# Patient Record
Sex: MALE | Race: WHITE | Employment: FULL TIME | URBAN - METROPOLITAN AREA
[De-identification: names, ages, dates, MRNs, and addresses within clinical notes are randomized per-mention and may not be internally consistent; named-entity substitution may affect disease eponyms.]

---

## 2017-06-19 ENCOUNTER — OFFICE VISIT (OUTPATIENT)
Dept: FAMILY MEDICINE CLINIC | Facility: CLINIC | Age: 57
End: 2017-06-19

## 2017-06-19 VITALS
WEIGHT: 186 LBS | DIASTOLIC BLOOD PRESSURE: 88 MMHG | HEIGHT: 68.5 IN | BODY MASS INDEX: 27.87 KG/M2 | TEMPERATURE: 98 F | RESPIRATION RATE: 16 BRPM | HEART RATE: 50 BPM | SYSTOLIC BLOOD PRESSURE: 130 MMHG

## 2017-06-19 DIAGNOSIS — K92.1 BLOOD IN STOOL: Primary | ICD-10-CM

## 2017-06-19 PROCEDURE — 99213 OFFICE O/P EST LOW 20 MIN: CPT | Performed by: FAMILY MEDICINE

## 2017-06-19 PROCEDURE — 82272 OCCULT BLD FECES 1-3 TESTS: CPT | Performed by: FAMILY MEDICINE

## 2017-06-19 NOTE — PATIENT INSTRUCTIONS
When You Have Gastrointestinal (GI) Bleeding    Blood in your vomit or stool can be a sign of gastrointestinal (GI) bleeding. GI bleeding can be scary. But the cause may not be serious. You should always see a doctor if GI bleeding occurs.   The GI tract · Blood tests. A blood sample is taken and sent to a lab for exam.  · Hemoccult test. Checks a stool sample for blood. · Stool culture. Checks a stool sample for bacteria or parasites. · X-ray, ultrasound, or CT scan.  Imaging tests that take pictures of © 4316-6639 [address], [address] [place]. All rights reserved. This information is not intended as a substitute for professional medical care. Always follow your healthcare professional's instructions.

## 2017-06-19 NOTE — PROGRESS NOTES
[facility] Family Medicine Office Note  Chief Complaint:   Patient presents with:  Rectal Bleeding: blood on stool x 2 weeks, hx hemorrhoids      HPI:   This is a 59year old male coming in for blood in stool x 2 weeks.   The bleeding is associate stool.  NEUROLOGICAL:  Denies headache, dizziness, syncope. MUSCULOSKELETAL:  Denies muscle, back pain, joint pain or stiffness.     EXAM:   /88 mmHg  Pulse 50  Temp(Src) 97.5 °F (36.4 °C) (Oral)  Resp 16  Ht 68.5\"  Wt 186 lb  BMI 27.87 kg/m2 Estima effects or complications from the treatments as a result of today.      Problem List:  Patient Active Problem List:     Other malignant neoplasm of skin, site unspecified     Unspecified asthma(493.90)      SUSIE MISTRY DO  6/19/2017  12:35 PM

## 2017-07-31 PROBLEM — K62.89 RECTAL MASS: Status: ACTIVE | Noted: 2017-07-31

## 2017-07-31 PROCEDURE — 88305 TISSUE EXAM BY PATHOLOGIST: CPT | Performed by: INTERNAL MEDICINE

## 2017-08-01 ENCOUNTER — TELEPHONE (OUTPATIENT)
Dept: FAMILY MEDICINE CLINIC | Facility: CLINIC | Age: 57
End: 2017-08-01

## 2017-08-01 ENCOUNTER — HOSPITAL ENCOUNTER (OUTPATIENT)
Dept: RADIATION ONCOLOGY | Age: 57
End: 2017-08-01
Attending: RADIOLOGY
Payer: COMMERCIAL

## 2017-08-01 NOTE — TELEPHONE ENCOUNTER
Call to pt-sts was advised yesterday after colonoscopy that a mass was found, needed to see surgeon. Advised of info noted below from dr cuevas. Pt agrees would like to see dr cuevas tomorrow morning, if possible. Offered multiple appt options.  Pt requests

## 2017-08-01 NOTE — TELEPHONE ENCOUNTER
Yes I have spoken to Dr. Naomi Berg. We are awaiting biopsies and Dr. Naomi Berg is going to follow up with him as well as refer him to general surgeon with Dr. Kemal Garcia. He will also have him see oncology.   I'd be happy to discuss all this in office if he wants

## 2017-08-01 NOTE — TELEPHONE ENCOUNTER
Incoming call from patient, states he had Colonoscopy done with  [facility] yesterday, and it doesn't look good, would like to know if you have reviewed the report. Please advise, thank you.

## 2017-08-02 ENCOUNTER — OFFICE VISIT (OUTPATIENT)
Dept: FAMILY MEDICINE CLINIC | Facility: CLINIC | Age: 57
End: 2017-08-02

## 2017-08-02 VITALS
HEIGHT: 70 IN | TEMPERATURE: 98 F | SYSTOLIC BLOOD PRESSURE: 110 MMHG | RESPIRATION RATE: 18 BRPM | HEART RATE: 72 BPM | WEIGHT: 185 LBS | DIASTOLIC BLOOD PRESSURE: 80 MMHG | BODY MASS INDEX: 26.48 KG/M2 | OXYGEN SATURATION: 99 %

## 2017-08-02 DIAGNOSIS — K62.89 RECTAL MASS: Primary | ICD-10-CM

## 2017-08-02 PROCEDURE — 99213 OFFICE O/P EST LOW 20 MIN: CPT | Performed by: FAMILY MEDICINE

## 2017-08-02 NOTE — PROGRESS NOTES
[facility] Family Medicine Office Note  Chief Complaint:   Patient presents with:  Lab Results: discuss colonoscopy result      HPI:   This is a 59year old male coming in for follow-up of colonoscopy results which revealed the patient has a rect muscle, back pain, joint pain or stiffness.   HEMATOLOGIC:  Denies anemia    EXAM:   /80   Pulse 72   Temp 97.8 °F (36.6 °C) (Oral)   Resp 18   Ht 70\"   Wt 185 lb   SpO2 99%   BMI 26.54 kg/m²  Estimated body mass index is 26.54 kg/m² as calculated fr

## 2017-08-07 PROCEDURE — 82378 CARCINOEMBRYONIC ANTIGEN: CPT | Performed by: INTERNAL MEDICINE

## 2017-08-09 ENCOUNTER — TELEPHONE (OUTPATIENT)
Dept: HEMATOLOGY/ONCOLOGY | Facility: HOSPITAL | Age: 57
End: 2017-08-09

## 2017-08-09 NOTE — TELEPHONE ENCOUNTER
Left message for patient - was notified by Dr Cherrie Singh about his situation. Asked him to call nurses line at [phone]. Will get him in to see me before I leave [facility] next week. See that he has an appt with Dr Mendy Mcmullen.   I will communicate with Dr Mendy Mcmullen as

## 2017-08-10 ENCOUNTER — TELEPHONE (OUTPATIENT)
Dept: HEMATOLOGY/ONCOLOGY | Facility: HOSPITAL | Age: 57
End: 2017-08-10

## 2017-08-10 NOTE — TELEPHONE ENCOUNTER
KATEM requesting call back to discuss logistics of Wednesday, 8/16/17, 12 pm consult with Dr. Fariha Og at [facility].

## 2017-08-11 ENCOUNTER — SURGERY (OUTPATIENT)
Age: 57
End: 2017-08-11

## 2017-08-11 ENCOUNTER — HOSPITAL ENCOUNTER (OUTPATIENT)
Facility: HOSPITAL | Age: 57
Setting detail: HOSPITAL OUTPATIENT SURGERY
Discharge: HOME | End: 2017-08-11
Attending: INTERNAL MEDICINE | Admitting: INTERNAL MEDICINE
Payer: COMMERCIAL

## 2017-08-11 VITALS
RESPIRATION RATE: 23 BRPM | SYSTOLIC BLOOD PRESSURE: 139 MMHG | BODY MASS INDEX: 26.48 KG/M2 | TEMPERATURE: 98 F | HEIGHT: 70 IN | DIASTOLIC BLOOD PRESSURE: 91 MMHG | HEART RATE: 71 BPM | OXYGEN SATURATION: 100 % | WEIGHT: 185 LBS

## 2017-08-11 DIAGNOSIS — K62.89 RECTAL MASS: ICD-10-CM

## 2017-08-11 PROCEDURE — 81210 BRAF GENE: CPT

## 2017-08-11 PROCEDURE — 81275 KRAS GENE VARIANTS EXON 2: CPT

## 2017-08-11 PROCEDURE — 0DBP8ZX EXCISION OF RECTUM, VIA NATURAL OR ARTIFICIAL OPENING ENDOSCOPIC, DIAGNOSTIC: ICD-10-PCS | Performed by: INTERNAL MEDICINE

## 2017-08-11 PROCEDURE — 81311 NRAS GENE VARIANTS EXON 2&3: CPT

## 2017-08-11 PROCEDURE — 81276 KRAS GENE ADDL VARIANTS: CPT

## 2017-08-11 PROCEDURE — 88305 TISSUE EXAM BY PATHOLOGIST: CPT | Performed by: INTERNAL MEDICINE

## 2017-08-11 PROCEDURE — 0DBN8ZX EXCISION OF SIGMOID COLON, VIA NATURAL OR ARTIFICIAL OPENING ENDOSCOPIC, DIAGNOSTIC: ICD-10-PCS | Performed by: INTERNAL MEDICINE

## 2017-08-11 PROCEDURE — 88381 MICRODISSECTION MANUAL: CPT

## 2017-08-11 PROCEDURE — 81404 MOPATH PROCEDURE LEVEL 5: CPT

## 2017-08-11 RX ORDER — SODIUM CHLORIDE, SODIUM LACTATE, POTASSIUM CHLORIDE, CALCIUM CHLORIDE 600; 310; 30; 20 MG/100ML; MG/100ML; MG/100ML; MG/100ML
INJECTION, SOLUTION INTRAVENOUS CONTINUOUS
Status: DISCONTINUED | OUTPATIENT
Start: 2017-08-11 | End: 2017-08-11

## 2017-08-11 NOTE — OPERATIVE REPORT
[facility]  Flexible Sigmoidoscopy Report      [address] [facility],7Gws Patient Status:  Hospital Outpatient Surgery    1960 MRN [id number]   [facility] ENDOSCOPY Attending Alec Arriaga MD       DATE OF OPERATION: 2017     PREOPER

## 2017-08-11 NOTE — H&P
[facility]  Pre-procedure History and Physical      [address] [facility],7Gws Patient Status:  Hospital Outpatient Surgery    1960 MRN [id number]   [facility] ENDOSCOPY Attending Eugenia Garcia MD   Hosp Day # 0 PCP SUSIE MISTRY, DO

## 2017-08-15 ENCOUNTER — APPOINTMENT (OUTPATIENT)
Dept: HEMATOLOGY/ONCOLOGY | Facility: HOSPITAL | Age: 57
End: 2017-08-15
Attending: INTERNAL MEDICINE
Payer: COMMERCIAL

## 2017-08-16 ENCOUNTER — OFFICE VISIT (OUTPATIENT)
Dept: HEMATOLOGY/ONCOLOGY | Facility: HOSPITAL | Age: 57
End: 2017-08-16
Attending: INTERNAL MEDICINE
Payer: COMMERCIAL

## 2017-08-16 VITALS
SYSTOLIC BLOOD PRESSURE: 129 MMHG | DIASTOLIC BLOOD PRESSURE: 72 MMHG | HEART RATE: 64 BPM | TEMPERATURE: 98 F | RESPIRATION RATE: 18 BRPM

## 2017-08-16 DIAGNOSIS — K62.89 RECTAL MASS: ICD-10-CM

## 2017-08-16 DIAGNOSIS — C20 RECTAL CANCER (HCC): Primary | ICD-10-CM

## 2017-08-16 PROCEDURE — 99245 OFF/OP CONSLTJ NEW/EST HI 55: CPT | Performed by: INTERNAL MEDICINE

## 2017-08-16 PROCEDURE — 99212 OFFICE O/P EST SF 10 MIN: CPT | Performed by: INTERNAL MEDICINE

## 2017-08-17 ENCOUNTER — HOSPITAL ENCOUNTER (OUTPATIENT)
Dept: MRI IMAGING | Facility: HOSPITAL | Age: 57
Discharge: HOME | End: 2017-08-17
Attending: INTERNAL MEDICINE
Payer: COMMERCIAL

## 2017-08-17 DIAGNOSIS — C20 RECTAL CANCER (HCC): Primary | ICD-10-CM

## 2017-08-17 DIAGNOSIS — K62.89 RECTAL MASS: ICD-10-CM

## 2017-08-17 DIAGNOSIS — C20 RECTAL CANCER (HCC): ICD-10-CM

## 2017-08-17 PROCEDURE — 72197 MRI PELVIS W/O & W/DYE: CPT | Performed by: INTERNAL MEDICINE

## 2017-08-17 PROCEDURE — 72197 MRI PELVIS W/O & W/DYE: CPT | Performed by: NURSE PRACTITIONER

## 2017-08-17 PROCEDURE — A9575 INJ GADOTERATE MEGLUMI 0.1ML: HCPCS | Performed by: INTERNAL MEDICINE

## 2017-08-17 NOTE — CONSULTS
[facility]    PATIENT'S NAME: Ayanna Lu   CONSULTING PHYSICIAN: Sunshine Slaughter MD   PATIENT ACCOUNT #: [de-identified] LOCATION: [address] [place] [address] RECORD #: [id number] YOB: 1960   CONSULTATION DATE: 08/16/2017       CANCER to typically have 1 bowel movement a day. He states that after the first bowel movement he will typically have some blood on the subsequent stools. He has not had any pelvic pain. He has no weight loss. He has no cough. He has no shortness of breath. are all in good health and range in age from 21 to 25. REVIEW OF SYSTEMS:  Stable weight. He has no headaches. He wears glasses for myopia. He has no hearing or dental issues.   He denies any asthma, cough, shortness of breath, reflux, peptic ulcer di muscularis which would obviously affect the staging of his rectal cancer clinically. I explained that so far we have only seen noninvasive mucosal involvement with cancer. He has had multiple biopsies that have shown dysplasia.   It is conceivable that melissa about this. I did give him information about a clinical psychologist who sees many of our patients. I also gave him contact information for our social workers.   His wife apparently has issues with anxiety and OCD and it has made their relationship more d

## 2017-08-21 ENCOUNTER — TELEPHONE (OUTPATIENT)
Dept: HEMATOLOGY/ONCOLOGY | Facility: HOSPITAL | Age: 57
End: 2017-08-21

## 2017-08-21 NOTE — TELEPHONE ENCOUNTER
Dr. Pérez Vaughn reccomends radiation chemotherapy first   Michael Party Wednesday at [address]   Will send the patient for review of MRI at GI as well

## 2017-08-21 NOTE — TELEPHONE ENCOUNTER
Requested results of recent CT scan and asking about MD referral for Radiation therapy. Jerilyn Cintron to call patient with results.

## 2017-08-22 ENCOUNTER — TELEPHONE (OUTPATIENT)
Dept: FAMILY MEDICINE CLINIC | Facility: CLINIC | Age: 57
End: 2017-08-22

## 2017-08-22 DIAGNOSIS — M72.2 PLANTAR FASCIITIS OF LEFT FOOT: Primary | ICD-10-CM

## 2017-08-22 NOTE — TELEPHONE ENCOUNTER
Requesting a personal call/discussion with Dr Ashly Andres regarding his MRI results. Dr Ashly Andres did take the call. ( pt is also working with oncology,Dr Moctezuma ,and GI ,Dr Ashanti Moreno

## 2017-08-22 NOTE — TELEPHONE ENCOUNTER
Msg received from [facility].   Referral to [facility] (PPO INSURANCE)    Reason for the order/referral: PT   PCP:  Dr Reji Vo   Refer to Provider (first and last name):  [facility]   Specialty:   [facility]   Patient Insu

## 2017-08-22 NOTE — TELEPHONE ENCOUNTER
Yeyo Holt per Dr Heriberto Ordoñez to refer pt. He has placed order.   I have faxed order to pt at his request: [phone]

## 2017-08-23 ENCOUNTER — APPOINTMENT (OUTPATIENT)
Dept: RADIATION ONCOLOGY | Age: 57
End: 2017-08-23
Payer: COMMERCIAL

## 2017-08-23 ENCOUNTER — HOSPITAL ENCOUNTER (OUTPATIENT)
Dept: RADIATION ONCOLOGY | Age: 57
Discharge: HOME | End: 2017-08-23
Attending: RADIOLOGY
Payer: COMMERCIAL

## 2017-08-23 ENCOUNTER — DOCUMENTATION ONLY (OUTPATIENT)
Dept: RADIATION ONCOLOGY | Facility: HOSPITAL | Age: 57
End: 2017-08-23

## 2017-08-23 VITALS
TEMPERATURE: 98 F | OXYGEN SATURATION: 99 % | DIASTOLIC BLOOD PRESSURE: 81 MMHG | SYSTOLIC BLOOD PRESSURE: 111 MMHG | HEART RATE: 68 BPM | RESPIRATION RATE: 16 BRPM | BODY MASS INDEX: 27 KG/M2 | WEIGHT: 185 LBS

## 2017-08-23 DIAGNOSIS — C20 RECTAL CANCER (HCC): Primary | ICD-10-CM

## 2017-08-23 DIAGNOSIS — K62.89 RECTAL MASS: ICD-10-CM

## 2017-08-23 PROCEDURE — 99214 OFFICE O/P EST MOD 30 MIN: CPT

## 2017-08-23 NOTE — PROGRESS NOTES
Nursing Consultation Note  Patient: Miguel Pallas  YOB: 1960  Age: 59year old  Radiation Oncologist: Dr. Crissie Hatchet  Referring Physician: Ofelia Ahn  Diagnosis: Rectal CA  Consult Date: 8/23/2017    History of Present Illness Alcohol use No    Comment: one per month/ stated none    Drug use: No    Sexual activity: Not on file     Other Topics Concern    Caffeine Concern No    Exercise Yes    Comment: 4 days per week    Seat Belt Yes    Special Diet No    Stress Concern No    We

## 2017-08-23 NOTE — PROGRESS NOTES
Nursing Consultation Note  Patient: Michelet Lawsno  YOB: 1960  Age: 59year old  Radiation Oncologist: Dr. Frank Mayers  Referring Physician: Geoff Larkin  Diagnosis:  Rectal tumor    Consult Date: 8/23/2017    Nursing Note:     Chem

## 2017-08-23 NOTE — PROGRESS NOTES
It may be in the patient's best interest to electively irradiate the inguinal lymph nodes on both sides to a dose of 45 Gy.   The indication is that the rectal tumor is very low lying, in great proximity to the anal canal, where a first echelon area of

## 2017-08-23 NOTE — PROGRESS NOTES
[facility] RADIATION ONCOLOGY CONSULTATION    PATIENT:   Michelet Lawson      59year old      2/11/1960    REFERRING MD:  Dr. Geoff Larkin    DIAGNOSIS:   CT3/T4 N0 Mx low rectal cancer      CC:    Treatment for newly diagnosed rectal cancer Currently reports good urinary function. Some frequent loose stools, 3 or 4 a day, some urgency but no incontinence, continued blood in the toilet. After multiple surgical opinions, he is going with Dr. Josie Ruggiero at [facility].   He says he has been LUNGS:   Clear to auscultation. Good respiratory effort. ABDOMEN: Soft. Non-tender. Non-distended. No masses. No hepatomegaly. No splenomegaly. EXT:  No peripheral edema. Intact range of motion. No inguinal adenopathy.   NEURO: Cranial nerves jorge He will likely have some fatigue from pelvic radiotherapy. There will be acute disturbance and lower GI and urinary function. His bleeding should stop midway through treatment.   He may need dietary modification, Imodium, Lomotil to manage diarrhea, and p

## 2017-08-23 NOTE — PATIENT INSTRUCTIONS
- Collette Finn as scheduled    - CT SIMULATION SCHEDULED FOR 8/24/2017 at 7:30am    - IF YOU HAVE ANY QUESTIONS  [address], [address] [facility] [phone]

## 2017-08-24 ENCOUNTER — HOSPITAL ENCOUNTER (OUTPATIENT)
Dept: RADIATION ONCOLOGY | Age: 57
Discharge: HOME | End: 2017-08-24
Attending: RADIOLOGY
Payer: COMMERCIAL

## 2017-08-24 ENCOUNTER — HOSPITAL ENCOUNTER (OUTPATIENT)
Dept: CT IMAGING | Facility: HOSPITAL | Age: 57
Discharge: HOME | End: 2017-08-24
Attending: RADIOLOGY
Payer: COMMERCIAL

## 2017-08-24 DIAGNOSIS — C20 RECTAL CANCER (HCC): ICD-10-CM

## 2017-08-24 PROCEDURE — 71250 CT THORAX DX C-: CPT | Performed by: RADIOLOGY

## 2017-08-24 PROCEDURE — 77334 RADIATION TREATMENT AID(S): CPT | Performed by: RADIOLOGY

## 2017-08-24 PROCEDURE — 77399 UNLISTED PX MED RADJ PHYSICS: CPT | Performed by: RADIOLOGY

## 2017-08-24 PROCEDURE — 77470 SPECIAL RADIATION TREATMENT: CPT | Performed by: RADIOLOGY

## 2017-08-28 ENCOUNTER — TELEPHONE (OUTPATIENT)
Dept: HEMATOLOGY/ONCOLOGY | Facility: HOSPITAL | Age: 57
End: 2017-08-28

## 2017-08-28 DIAGNOSIS — C20 RECTAL CANCER (HCC): Primary | ICD-10-CM

## 2017-08-28 RX ORDER — CAPECITABINE 500 MG/1
TABLET, FILM COATED ORAL
Qty: 180 TABLET | Refills: 1 | Status: SHIPPED | OUTPATIENT
Start: 2017-08-28 | End: 2017-08-29

## 2017-08-28 RX ORDER — CAPECITABINE 150 MG/1
TABLET, FILM COATED ORAL
Qty: 60 TABLET | Refills: 1 | Status: SHIPPED | OUTPATIENT
Start: 2017-08-28 | End: 2017-08-29

## 2017-08-28 NOTE — TELEPHONE ENCOUNTER
Patient will need chemotherapy education        Regimen is in. Montano Barbara is 825 BID every day (7 days a week) while on RT.      Sent request to [facility] to schedule

## 2017-08-29 ENCOUNTER — OFFICE VISIT (OUTPATIENT)
Dept: HEMATOLOGY/ONCOLOGY | Facility: HOSPITAL | Age: 57
End: 2017-08-29
Attending: INTERNAL MEDICINE
Payer: COMMERCIAL

## 2017-08-29 DIAGNOSIS — C20 RECTAL CANCER (HCC): Primary | ICD-10-CM

## 2017-08-29 DIAGNOSIS — Z71.9 ENCOUNTER FOR EDUCATION: ICD-10-CM

## 2017-08-29 PROCEDURE — 77300 RADIATION THERAPY DOSE PLAN: CPT | Performed by: RADIOLOGY

## 2017-08-29 PROCEDURE — 77338 DESIGN MLC DEVICE FOR IMRT: CPT | Performed by: RADIOLOGY

## 2017-08-29 PROCEDURE — 77301 RADIOTHERAPY DOSE PLAN IMRT: CPT | Performed by: RADIOLOGY

## 2017-08-29 PROCEDURE — 99214 OFFICE O/P EST MOD 30 MIN: CPT | Performed by: NURSE PRACTITIONER

## 2017-08-29 RX ORDER — CAPECITABINE 150 MG/1
TABLET, FILM COATED ORAL
Qty: 60 TABLET | Refills: 1 | Status: ON HOLD | OUTPATIENT
Start: 2017-08-29 | End: 2017-09-12

## 2017-08-29 RX ORDER — PROCHLORPERAZINE MALEATE 10 MG
10 TABLET ORAL EVERY 6 HOURS PRN
Qty: 30 TABLET | Refills: 3 | Status: SHIPPED | OUTPATIENT
Start: 2017-08-29 | End: 2017-11-08

## 2017-08-29 RX ORDER — CAPECITABINE 500 MG/1
TABLET, FILM COATED ORAL
Qty: 180 TABLET | Refills: 1 | Status: ON HOLD | OUTPATIENT
Start: 2017-08-29 | End: 2017-09-12

## 2017-08-29 NOTE — PROGRESS NOTES
ORAL CHEMOTHERAPY EDUCATION RECORD  Learner:  Patient  Barriers / Limitations:  None    Diagnosis:   Rectal cancer  Chemo Agents / Protocol:   Oral xeloda with radiation  neoadjuvant  Medication Name:   xeloda  Dose:  1650mg   Frequency:  Twice daily  Pa during education, his wife will not touch him at all for fear of catching his cancer, she has anxiety and OCD issues. The patient has 8 children who are grown but supportive  Will ask social work to reach out for counseling.

## 2017-08-31 ENCOUNTER — OFFICE VISIT (OUTPATIENT)
Dept: HEMATOLOGY/ONCOLOGY | Age: 57
End: 2017-08-31
Attending: INTERNAL MEDICINE
Payer: COMMERCIAL

## 2017-08-31 ENCOUNTER — NURSE ONLY (OUTPATIENT)
Dept: RADIATION ONCOLOGY | Age: 57
End: 2017-08-31

## 2017-08-31 ENCOUNTER — HOSPITAL ENCOUNTER (OUTPATIENT)
Dept: RADIATION ONCOLOGY | Age: 57
Discharge: HOME | End: 2017-08-31
Attending: RADIOLOGY
Payer: COMMERCIAL

## 2017-08-31 VITALS
OXYGEN SATURATION: 100 % | DIASTOLIC BLOOD PRESSURE: 91 MMHG | SYSTOLIC BLOOD PRESSURE: 147 MMHG | HEART RATE: 69 BPM | RESPIRATION RATE: 24 BRPM

## 2017-08-31 DIAGNOSIS — C20 RECTAL CANCER (HCC): Primary | ICD-10-CM

## 2017-08-31 PROCEDURE — 99214 OFFICE O/P EST MOD 30 MIN: CPT | Performed by: INTERNAL MEDICINE

## 2017-08-31 PROCEDURE — 77386 HC IMRT COMPLEX: CPT | Performed by: RADIOLOGY

## 2017-08-31 NOTE — PROGRESS NOTES
Pt received first concurrent chemoRT today. After his treatment, pt started to shake, hyperventilate while in dressing room. States he felt some bladder spasm after he urinated; but spasm resolved after a few minutes.  Continued to hyperventilate, pt became

## 2017-09-01 ENCOUNTER — SOCIAL WORK SERVICES (OUTPATIENT)
Dept: HEMATOLOGY/ONCOLOGY | Facility: HOSPITAL | Age: 57
End: 2017-09-01

## 2017-09-01 ENCOUNTER — HOSPITAL ENCOUNTER (OUTPATIENT)
Dept: RADIATION ONCOLOGY | Age: 57
Discharge: HOME | End: 2017-09-01
Attending: RADIOLOGY
Payer: COMMERCIAL

## 2017-09-01 PROCEDURE — 77386 HC IMRT COMPLEX: CPT | Performed by: RADIOLOGY

## 2017-09-01 NOTE — PROGRESS NOTES
MARY met with patient yesterday, his first day of radiation treatment. Patient having difficulty breathing, tearful. Patient states he has anxiety which he manages by running.    Patient has had since diagnosis been experiencing family emergencies and the d

## 2017-09-01 NOTE — PROGRESS NOTES
[facility] Radiation Treatment Management Note 1-5    Patient:  Miguel Pallas  Age:  59year old  Visit Diagnosis:    1.  Rectal cancer ([address]      Primary Rad/Onc:  Dr. Crissie Hatchet    Site Delivered Dose (Gy) Prescribed Dose (Gy)

## 2017-09-05 ENCOUNTER — HOSPITAL ENCOUNTER (OUTPATIENT)
Dept: RADIATION ONCOLOGY | Age: 57
Discharge: HOME | End: 2017-09-05
Attending: RADIOLOGY
Payer: COMMERCIAL

## 2017-09-05 ENCOUNTER — NURSE ONLY (OUTPATIENT)
Dept: HEMATOLOGY/ONCOLOGY | Age: 57
End: 2017-09-05
Attending: INTERNAL MEDICINE
Payer: COMMERCIAL

## 2017-09-05 ENCOUNTER — SOCIAL WORK SERVICES (OUTPATIENT)
Dept: HEMATOLOGY/ONCOLOGY | Facility: HOSPITAL | Age: 57
End: 2017-09-05

## 2017-09-05 ENCOUNTER — APPOINTMENT (OUTPATIENT)
Dept: HEMATOLOGY/ONCOLOGY | Facility: HOSPITAL | Age: 57
End: 2017-09-05
Attending: INTERNAL MEDICINE

## 2017-09-05 VITALS
TEMPERATURE: 97 F | OXYGEN SATURATION: 99 % | HEART RATE: 66 BPM | WEIGHT: 182.5 LBS | SYSTOLIC BLOOD PRESSURE: 121 MMHG | DIASTOLIC BLOOD PRESSURE: 81 MMHG | BODY MASS INDEX: 26 KG/M2 | RESPIRATION RATE: 16 BRPM

## 2017-09-05 DIAGNOSIS — C20 RECTAL CANCER (HCC): Primary | ICD-10-CM

## 2017-09-05 DIAGNOSIS — C20 RECTAL CANCER (HCC): ICD-10-CM

## 2017-09-05 PROBLEM — F41.9 ANXIETY: Status: ACTIVE | Noted: 2017-09-05

## 2017-09-05 LAB
ALBUMIN SERPL-MCNC: 3.6 G/DL (ref 3.5–4.8)
ALP LIVER SERPL-CCNC: 86 U/L (ref 45–117)
ALT SERPL-CCNC: 22 U/L (ref 17–63)
AST SERPL-CCNC: 25 U/L (ref 15–41)
BASOPHILS # BLD AUTO: 0.04 X10(3) UL (ref 0–0.1)
BASOPHILS NFR BLD AUTO: 0.8 %
BILIRUB SERPL-MCNC: 0.4 MG/DL (ref 0.1–2)
BUN BLD-MCNC: 18 MG/DL (ref 8–20)
CALCIUM BLD-MCNC: 8.7 MG/DL (ref 8.3–10.3)
CHLORIDE: 103 MMOL/L (ref 101–111)
CO2: 29 MMOL/L (ref 22–32)
CREAT BLD-MCNC: 1.14 MG/DL (ref 0.7–1.3)
EOSINOPHIL # BLD AUTO: 0.14 X10(3) UL (ref 0–0.3)
EOSINOPHIL NFR BLD AUTO: 2.7 %
ERYTHROCYTE [DISTWIDTH] IN BLOOD BY AUTOMATED COUNT: 12.5 % (ref 11.5–16)
GLUCOSE BLD-MCNC: 54 MG/DL (ref 70–99)
HCT VFR BLD AUTO: 45 % (ref 37–53)
HGB BLD-MCNC: 15.3 G/DL (ref 13–17)
IMMATURE GRANULOCYTE COUNT: 0.01 X10(3) UL (ref 0–1)
IMMATURE GRANULOCYTE RATIO %: 0.2 %
LYMPHOCYTES # BLD AUTO: 1.48 X10(3) UL (ref 0.9–4)
LYMPHOCYTES NFR BLD AUTO: 28.4 %
M PROTEIN MFR SERPL ELPH: 7.4 G/DL (ref 6.1–8.3)
MCH RBC QN AUTO: 31.7 PG (ref 27–33.2)
MCHC RBC AUTO-ENTMCNC: 34 G/DL (ref 31–37)
MCV RBC AUTO: 93.2 FL (ref 80–99)
MONOCYTES # BLD AUTO: 0.69 X10(3) UL (ref 0.1–0.6)
MONOCYTES NFR BLD AUTO: 13.2 %
NEUTROPHIL ABS PRELIM: 2.86 X10 (3) UL (ref 1.3–6.7)
NEUTROPHILS # BLD AUTO: 2.86 X10(3) UL (ref 1.3–6.7)
NEUTROPHILS NFR BLD AUTO: 54.7 %
PLATELET # BLD AUTO: 183 10(3)UL (ref 150–450)
POTASSIUM SERPL-SCNC: 4 MMOL/L (ref 3.6–5.1)
RBC # BLD AUTO: 4.83 X10(6)UL (ref 4.3–5.7)
RED CELL DISTRIBUTION WIDTH-SD: 42.7 FL (ref 35.1–46.3)
SODIUM SERPL-SCNC: 137 MMOL/L (ref 136–144)
WBC # BLD AUTO: 5.2 X10(3) UL (ref 4–13)

## 2017-09-05 PROCEDURE — 77386 HC IMRT COMPLEX: CPT | Performed by: RADIOLOGY

## 2017-09-05 PROCEDURE — 80053 COMPREHEN METABOLIC PANEL: CPT

## 2017-09-05 PROCEDURE — 85025 COMPLETE CBC W/AUTO DIFF WBC: CPT

## 2017-09-05 PROCEDURE — 36415 COLL VENOUS BLD VENIPUNCTURE: CPT

## 2017-09-05 NOTE — PROGRESS NOTES
MARY placed [employer] application at first floor reception desk in [place] for patient to  at his radiation appointment.

## 2017-09-06 PROCEDURE — 77386 HC IMRT COMPLEX: CPT | Performed by: RADIOLOGY

## 2017-09-06 NOTE — PROGRESS NOTES
[facility]    PATIENT'S NAME: Thom Puga   ATTENDING PHYSICIAN: Marimar Daniel M.D.    PATIENT ACCOUNT #: [de-identified] LOCATION: [address] [place] [address] RECORD #: [id number] YOB: 1960   DATE OF SERVICE: 08/31/2017       CANCER ARNIE chance to settle down, his symptoms all resolved. PHYSICAL EXAMINATION:    GENERAL:  He is a well-developed, well-nourished male in no acute distress. VITAL SIGNS:  Performance status is 0.   Weight 185 pounds, blood pressure 147/91, pulse 69, respirato Heena Sims M.D.

## 2017-09-07 ENCOUNTER — TELEPHONE (OUTPATIENT)
Dept: HEMATOLOGY/ONCOLOGY | Facility: HOSPITAL | Age: 57
End: 2017-09-07

## 2017-09-07 ENCOUNTER — NURSE ONLY (OUTPATIENT)
Dept: HEMATOLOGY/ONCOLOGY | Age: 57
End: 2017-09-07
Attending: INTERNAL MEDICINE
Payer: COMMERCIAL

## 2017-09-07 PROCEDURE — 77386 HC IMRT COMPLEX: CPT | Performed by: RADIOLOGY

## 2017-09-07 NOTE — PROGRESS NOTES
Nutrition Consultation    Patient Name: Irineo Covarrubias  YOB: 1960  Medical Record Number: [id number]   Account Number: [de-identified]  Dietitian: Pérez Brenner    Date of visit: 9/7/2017    Diet Rx: high protein/calorie, low residue as sherry his Xeloda. RD offered for pt to be checked by med/onc staff and pt agreed. RD brought pt to RN, explained pt sx, provided pt w/ juice, PB and crackers; RD left pt w/ RN who proceeded to check vitals. RD offered support/encouragement.  RD will continue

## 2017-09-07 NOTE — TELEPHONE ENCOUNTER
Pt was a walk-in from RT today. States he is having SOB that starts about 1 hour after taking his Xeloda. Denies fever/chills. Denies chest pain. Denies any other complaints. Pt states SOB resolves on its own after 1 hour. VSS. O2 98%.   Discuss pt complain

## 2017-09-08 PROCEDURE — 77386 HC IMRT COMPLEX: CPT | Performed by: RADIOLOGY

## 2017-09-08 PROCEDURE — 77336 RADIATION PHYSICS CONSULT: CPT | Performed by: RADIOLOGY

## 2017-09-11 ENCOUNTER — OFFICE VISIT (OUTPATIENT)
Dept: HEMATOLOGY/ONCOLOGY | Age: 57
End: 2017-09-11
Attending: CLINICAL NURSE SPECIALIST
Payer: COMMERCIAL

## 2017-09-11 ENCOUNTER — APPOINTMENT (OUTPATIENT)
Dept: GENERAL RADIOLOGY | Age: 57
End: 2017-09-11
Attending: EMERGENCY MEDICINE
Payer: COMMERCIAL

## 2017-09-11 ENCOUNTER — TELEPHONE (OUTPATIENT)
Dept: HEMATOLOGY/ONCOLOGY | Facility: HOSPITAL | Age: 57
End: 2017-09-11

## 2017-09-11 ENCOUNTER — HOSPITAL ENCOUNTER (OUTPATIENT)
Dept: RADIATION ONCOLOGY | Age: 57
Discharge: HOME | End: 2017-09-11
Attending: RADIOLOGY
Payer: COMMERCIAL

## 2017-09-11 ENCOUNTER — HOSPITAL ENCOUNTER (OUTPATIENT)
Facility: HOSPITAL | Age: 57
Setting detail: OBSERVATION
Discharge: HOME | End: 2017-09-12
Attending: EMERGENCY MEDICINE | Admitting: HOSPITALIST
Payer: COMMERCIAL

## 2017-09-11 ENCOUNTER — APPOINTMENT (OUTPATIENT)
Dept: CT IMAGING | Age: 57
End: 2017-09-11
Attending: EMERGENCY MEDICINE
Payer: COMMERCIAL

## 2017-09-11 ENCOUNTER — NURSE ONLY (OUTPATIENT)
Dept: HEMATOLOGY/ONCOLOGY | Age: 57
End: 2017-09-11
Attending: INTERNAL MEDICINE
Payer: COMMERCIAL

## 2017-09-11 VITALS
RESPIRATION RATE: 20 BRPM | DIASTOLIC BLOOD PRESSURE: 88 MMHG | HEART RATE: 68 BPM | OXYGEN SATURATION: 100 % | SYSTOLIC BLOOD PRESSURE: 140 MMHG | TEMPERATURE: 96 F | WEIGHT: 183.63 LBS | BODY MASS INDEX: 26 KG/M2

## 2017-09-11 VITALS — SYSTOLIC BLOOD PRESSURE: 134 MMHG | DIASTOLIC BLOOD PRESSURE: 85 MMHG | HEART RATE: 66 BPM

## 2017-09-11 DIAGNOSIS — R53.1 WEAKNESS GENERALIZED: ICD-10-CM

## 2017-09-11 DIAGNOSIS — C20 RECTAL CANCER (HCC): ICD-10-CM

## 2017-09-11 DIAGNOSIS — C20 RECTAL CANCER (HCC): Primary | ICD-10-CM

## 2017-09-11 DIAGNOSIS — R55 SYNCOPE, UNSPECIFIED SYNCOPE TYPE: ICD-10-CM

## 2017-09-11 DIAGNOSIS — R21 RASH AND NONSPECIFIC SKIN ERUPTION: Primary | ICD-10-CM

## 2017-09-11 DIAGNOSIS — K62.89 RECTAL MASS: Primary | ICD-10-CM

## 2017-09-11 PROBLEM — E16.2 HYPOGLYCEMIA: Status: ACTIVE | Noted: 2017-09-11

## 2017-09-11 LAB
ALBUMIN SERPL-MCNC: 3.6 G/DL (ref 3.5–4.8)
ALP LIVER SERPL-CCNC: 94 U/L (ref 45–117)
ALT SERPL-CCNC: 25 U/L (ref 17–63)
APTT PPP: 26.5 SECONDS (ref 25–34)
AST SERPL-CCNC: 28 U/L (ref 15–41)
ATRIAL RATE: 75 BPM
BASOPHILS # BLD AUTO: 0.02 X10(3) UL (ref 0–0.1)
BASOPHILS NFR BLD AUTO: 0.4 %
BILIRUB SERPL-MCNC: 0.5 MG/DL (ref 0.1–2)
BILIRUB UR QL STRIP.AUTO: NEGATIVE
BUN BLD-MCNC: 14 MG/DL (ref 8–20)
CALCIUM BLD-MCNC: 9.2 MG/DL (ref 8.3–10.3)
CHLORIDE: 104 MMOL/L (ref 101–111)
CLARITY UR REFRACT.AUTO: CLEAR
CO2: 27 MMOL/L (ref 22–32)
CREAT BLD-MCNC: 1.19 MG/DL (ref 0.7–1.3)
EOSINOPHIL # BLD AUTO: 0.1 X10(3) UL (ref 0–0.3)
EOSINOPHIL NFR BLD AUTO: 2.2 %
ERYTHROCYTE [DISTWIDTH] IN BLOOD BY AUTOMATED COUNT: 12.8 % (ref 11.5–16)
GLUCOSE BLD-MCNC: 66 MG/DL (ref 70–99)
GLUCOSE UR STRIP.AUTO-MCNC: NEGATIVE MG/DL
HCT VFR BLD AUTO: 46.5 % (ref 37–53)
HGB BLD-MCNC: 15.9 G/DL (ref 13–17)
IMMATURE GRANULOCYTE COUNT: 0.02 X10(3) UL (ref 0–1)
IMMATURE GRANULOCYTE RATIO %: 0.4 %
INR BLD: 0.99 (ref 0.89–1.12)
KETONES UR STRIP.AUTO-MCNC: NEGATIVE MG/DL
LEUKOCYTE ESTERASE UR QL STRIP.AUTO: NEGATIVE
LYMPHOCYTES # BLD AUTO: 0.66 X10(3) UL (ref 0.9–4)
LYMPHOCYTES NFR BLD AUTO: 14.8 %
M PROTEIN MFR SERPL ELPH: 7.7 G/DL (ref 6.1–8.3)
MCH RBC QN AUTO: 31.5 PG (ref 27–33.2)
MCHC RBC AUTO-ENTMCNC: 34.2 G/DL (ref 31–37)
MCV RBC AUTO: 92.1 FL (ref 80–99)
MONOCYTES # BLD AUTO: 0.6 X10(3) UL (ref 0.1–0.6)
MONOCYTES NFR BLD AUTO: 13.5 %
NEUTROPHIL ABS PRELIM: 3.05 X10 (3) UL (ref 1.3–6.7)
NEUTROPHILS # BLD AUTO: 3.05 X10(3) UL (ref 1.3–6.7)
NEUTROPHILS NFR BLD AUTO: 68.7 %
NITRITE UR QL STRIP.AUTO: NEGATIVE
P AXIS: 58 DEGREES
P-R INTERVAL: 158 MS
PH UR STRIP.AUTO: 7.5 [PH] (ref 4.5–8)
PLATELET # BLD AUTO: 191 10(3)UL (ref 150–450)
POTASSIUM SERPL-SCNC: 3.7 MMOL/L (ref 3.6–5.1)
PROT UR STRIP.AUTO-MCNC: NEGATIVE MG/DL
PSA SERPL DL<=0.01 NG/ML-MCNC: 12.8 SECONDS (ref 11.8–14.1)
Q-T INTERVAL: 372 MS
QRS DURATION: 104 MS
QTC CALCULATION (BEZET): 415 MS
R AXIS: -13 DEGREES
RBC # BLD AUTO: 5.05 X10(6)UL (ref 4.3–5.7)
RBC UR QL AUTO: NEGATIVE
RED CELL DISTRIBUTION WIDTH-SD: 41.1 FL (ref 35.1–46.3)
SODIUM SERPL-SCNC: 138 MMOL/L (ref 136–144)
SP GR UR STRIP.AUTO: 1.01 (ref 1–1.03)
T AXIS: 53 DEGREES
UROBILINOGEN UR STRIP.AUTO-MCNC: 0.2 MG/DL
VENTRICULAR RATE: 75 BPM
WBC # BLD AUTO: 4.5 X10(3) UL (ref 4–13)

## 2017-09-11 PROCEDURE — 77386 HC IMRT COMPLEX: CPT | Performed by: RADIOLOGY

## 2017-09-11 PROCEDURE — 71010 XR CHEST AP PORTABLE  (CPT=71010): CPT | Performed by: EMERGENCY MEDICINE

## 2017-09-11 PROCEDURE — 70450 CT HEAD/BRAIN W/O DYE: CPT | Performed by: EMERGENCY MEDICINE

## 2017-09-11 PROCEDURE — 99220 INITIAL OBSERVATION CARE,LEVL III: CPT | Performed by: HOSPITALIST

## 2017-09-11 RX ORDER — MORPHINE SULFATE 4 MG/ML
1 INJECTION, SOLUTION INTRAMUSCULAR; INTRAVENOUS EVERY 2 HOUR PRN
Status: DISCONTINUED | OUTPATIENT
Start: 2017-09-11 | End: 2017-09-12

## 2017-09-11 RX ORDER — SODIUM CHLORIDE 9 MG/ML
INJECTION, SOLUTION INTRAVENOUS CONTINUOUS
Status: DISCONTINUED | OUTPATIENT
Start: 2017-09-11 | End: 2017-09-12

## 2017-09-11 RX ORDER — ENOXAPARIN SODIUM 100 MG/ML
40 INJECTION SUBCUTANEOUS DAILY
Status: DISCONTINUED | OUTPATIENT
Start: 2017-09-11 | End: 2017-09-12

## 2017-09-11 RX ORDER — HYDROCODONE BITARTRATE AND ACETAMINOPHEN 5; 325 MG/1; MG/1
1 TABLET ORAL EVERY 6 HOURS PRN
Status: DISCONTINUED | OUTPATIENT
Start: 2017-09-11 | End: 2017-09-12

## 2017-09-11 RX ORDER — ACETAMINOPHEN 325 MG/1
650 TABLET ORAL EVERY 6 HOURS PRN
Status: DISCONTINUED | OUTPATIENT
Start: 2017-09-11 | End: 2017-09-12

## 2017-09-11 RX ORDER — MORPHINE SULFATE 4 MG/ML
2 INJECTION, SOLUTION INTRAMUSCULAR; INTRAVENOUS EVERY 2 HOUR PRN
Status: DISCONTINUED | OUTPATIENT
Start: 2017-09-11 | End: 2017-09-12

## 2017-09-11 RX ORDER — DICYCLOMINE HYDROCHLORIDE 10 MG/1
10 CAPSULE ORAL
Status: DISCONTINUED | OUTPATIENT
Start: 2017-09-11 | End: 2017-09-11

## 2017-09-11 RX ORDER — POTASSIUM CHLORIDE 20 MEQ/1
40 TABLET, EXTENDED RELEASE ORAL ONCE
Status: COMPLETED | OUTPATIENT
Start: 2017-09-11 | End: 2017-09-11

## 2017-09-11 RX ORDER — MORPHINE SULFATE 4 MG/ML
4 INJECTION, SOLUTION INTRAMUSCULAR; INTRAVENOUS ONCE
Status: COMPLETED | OUTPATIENT
Start: 2017-09-11 | End: 2017-09-11

## 2017-09-11 RX ORDER — ONDANSETRON 2 MG/ML
4 INJECTION INTRAMUSCULAR; INTRAVENOUS EVERY 6 HOURS PRN
Status: DISCONTINUED | OUTPATIENT
Start: 2017-09-11 | End: 2017-09-12

## 2017-09-11 RX ORDER — DICYCLOMINE HYDROCHLORIDE 10 MG/1
10 CAPSULE ORAL 3 TIMES DAILY PRN
Status: DISCONTINUED | OUTPATIENT
Start: 2017-09-11 | End: 2017-09-12

## 2017-09-11 NOTE — TELEPHONE ENCOUNTER
Pt seen in RT for treatment. Taking Xeloda PO. Per MD- pt has rash to entire body. Added for APN assessment today- Morena aware.

## 2017-09-11 NOTE — ED PROVIDER NOTES
Patient Seen in: [facility] In [place]    History   Patient presents with:  Stroke (neurologic)  Rash Skin Problem (integumentary)    Stated Complaint: facial droop/rash after radiation    HPI    68-year-old male presents emergency de Temporal  SpO2: 100 %  O2 Device: None (Room air)    Current:/78   Pulse 56   Temp 97.9 °F (36.6 °C) (Temporal)   Resp 16   Ht 177.8 cm (5' 10\")   Wt 83.9 kg   SpO2 100%   BMI 26.54 kg/m²         Physical Exam    Vital signs reviewed  General appear Please view results for these tests on the individual orders. URINALYSIS WITH CULTURE REFLEX   RAINBOW DRAW BLUE   RAINBOW DRAW LAVENDER   RAINBOW DRAW LIGHT GREEN   RAINBOW DRAW GOLD     EKG    Rate, intervals and axes as noted on EKG Report.   Rate: oncology and the hospitalist.  At this point I do not feel comfortable letting the patient go home. He is feeling a little better and no signs of any neuro deficits but still seems very flat affect and slightly lethargic.   I did read some notes saying noris

## 2017-09-11 NOTE — PROGRESS NOTES
09/11/17 1735 09/11/17 1740 09/11/17 1745   Vital Signs   Pulse 53 55 65   /67 113/79 120/76   Patient Position Lying Sitting Standing

## 2017-09-11 NOTE — PROGRESS NOTES
[facility] Radiation Treatment Management Note 6-10    Patient:  Malia Russell  Age:  59year old  Visit Diagnosis:    1.  Rectal cancer ([address]      Primary Rad/Onc:  Dr. Denice Owen    Site Delivered Dose (Gy) Prescribed Dose (Gy) exam, diaphoretic, in obvious discomfort.  +rash to face, B/L UE (none to palms or soles of feet). VSS    Pt felt better after emptying his bladder. Pt doing fair. Cont RT with Xeloda (7d/wk) as planned. Ports reviewed and approved.   RTC 1 week for O

## 2017-09-11 NOTE — ED NOTES
Pt is alert and conversant. He was telling me that he thinks he passed out because of severe abd cramping. Pt skin is warm. Still complaining of and cramping now 5/10. MD aware and ordered morphine 4mg iv--given.  When pt got here he was very lethargic and

## 2017-09-11 NOTE — ED NOTES
Pt assisted to urinate per urinal- pt states he can't urinate while lying down- assisted to get up. Able to urinate standing up at the side of the bed. Urinated about 800 cc.

## 2017-09-11 NOTE — PROGRESS NOTES
NURSING ADMISSION NOTE      Patient admitted via Ambulance  Oriented to room. Safety precautions initiated. Bed in low position. Call light in reach.

## 2017-09-11 NOTE — ED INITIAL ASSESSMENT (HPI)
Brought by [facility] staff-[facility] pt had facial droop and slump on the chair. States just finished his radiation-had generalized rash. C/o abd cramping/headache while in cancer center-resolved upon arrival. H/o rectal cancer.

## 2017-09-11 NOTE — PROGRESS NOTES
Pt presented to the cancer center for APN assessment for rash. Brought by RT therapist.  Charge RN called by MA- pt lethargic and states he is not feeling well. Unable to answer clearly.   Observed patient slumped to left side of wheelchair, left facial jose

## 2017-09-11 NOTE — H&P
[facility]  History and Physical     [facility] Kayla Martínez Patient Status:  Observation    1960 MRN [id number]   [facility] 4NW-A Attending Ezra Maurer MD   Hosp Day # 0 PCP Brittany Brice DO     Chief Complaint: Syncope Brother    • Cancer Sister      melanoma       Allergies: No Known Allergies    Medications:    No current facility-administered medications on file prior to encounter.    Current Outpatient Prescriptions on File Prior to Encounter:  Prochlorperazine Maleat 138   K  3.7   CL  104   CO2  27.0   ALKPHO  94   AST  28   ALT  25   BILT  0.5   TP  7.7       Estimated Creatinine Clearance: 69 mL/min (based on SCr of 1.19 mg/dL).     Recent Labs   Lab  09/11/17   0953   PTP  12.8   INR  0.99       No results for input

## 2017-09-12 ENCOUNTER — APPOINTMENT (OUTPATIENT)
Dept: CT IMAGING | Facility: HOSPITAL | Age: 57
End: 2017-09-12
Attending: INTERNAL MEDICINE
Payer: COMMERCIAL

## 2017-09-12 VITALS
HEART RATE: 61 BPM | BODY MASS INDEX: 25.91 KG/M2 | WEIGHT: 181 LBS | OXYGEN SATURATION: 99 % | HEIGHT: 70 IN | TEMPERATURE: 98 F | SYSTOLIC BLOOD PRESSURE: 130 MMHG | RESPIRATION RATE: 18 BRPM | DIASTOLIC BLOOD PRESSURE: 79 MMHG

## 2017-09-12 PROBLEM — R10.9 ABDOMINAL CRAMPING: Status: ACTIVE | Noted: 2017-09-12

## 2017-09-12 PROBLEM — R21 MACULOPAPULAR RASH, GENERALIZED: Status: ACTIVE | Noted: 2017-09-12

## 2017-09-12 LAB
GLUCOSE BLD-MCNC: 90 MG/DL (ref 65–99)
POTASSIUM SERPL-SCNC: 4.3 MMOL/L (ref 3.6–5.1)

## 2017-09-12 PROCEDURE — 99215 OFFICE O/P EST HI 40 MIN: CPT | Performed by: INTERNAL MEDICINE

## 2017-09-12 PROCEDURE — 74177 CT ABD & PELVIS W/CONTRAST: CPT | Performed by: INTERNAL MEDICINE

## 2017-09-12 PROCEDURE — 99225 SUBSEQUENT OBSERVATION CARE: CPT | Performed by: HOSPITALIST

## 2017-09-12 RX ORDER — ALPRAZOLAM 0.25 MG/1
0.25 TABLET ORAL 3 TIMES DAILY PRN
Qty: 90 TABLET | Refills: 0 | Status: SHIPPED | OUTPATIENT
Start: 2017-09-12 | End: 2018-08-02

## 2017-09-12 RX ORDER — CAPECITABINE 500 MG/1
TABLET, FILM COATED ORAL
Qty: 180 TABLET | Refills: 1 | Status: SHIPPED | OUTPATIENT
Start: 2017-09-12 | End: 2017-11-08

## 2017-09-12 RX ORDER — DICYCLOMINE HYDROCHLORIDE 10 MG/1
10 CAPSULE ORAL
Status: DISCONTINUED | OUTPATIENT
Start: 2017-09-12 | End: 2017-09-12

## 2017-09-12 RX ORDER — DICYCLOMINE HYDROCHLORIDE 10 MG/1
10 CAPSULE ORAL
Qty: 90 CAPSULE | Refills: 3 | Status: SHIPPED | OUTPATIENT
Start: 2017-09-12 | End: 2017-11-08

## 2017-09-12 RX ORDER — HYDROCODONE BITARTRATE AND ACETAMINOPHEN 5; 325 MG/1; MG/1
1 TABLET ORAL EVERY 6 HOURS PRN
Qty: 60 TABLET | Refills: 0 | Status: SHIPPED | OUTPATIENT
Start: 2017-09-12 | End: 2017-11-08

## 2017-09-12 RX ORDER — CAPECITABINE 150 MG/1
TABLET, FILM COATED ORAL
Qty: 60 TABLET | Refills: 1 | Status: SHIPPED | OUTPATIENT
Start: 2017-09-12 | End: 2017-09-14

## 2017-09-12 NOTE — PROGRESS NOTES
[facility]  Progress Note     Jhoan Grant Patient Status:  Observation    1960 MRN [id number]   [facility] 4NW-A Attending Katie Muir MD   Hosp Day # 0 [address]     Chief Complaint: Syncope    S: Annvena Leanna with IVF, pain control, antiemetics  2. CT brain unremarkable  3. Orthostatics negative   2. Rectal cancer, getting XRT, per oncology  3. Abdominal cramping  1. Unclear etiology  2. F/u CT abdomen/pelvis  4. Hypokalemia, resolved  5. Dehydration, IVF  6.  R

## 2017-09-12 NOTE — CONSULTS
[facility]    PATIENT'S NAME: Vivian Alberto   ATTENDING PHYSICIAN: Isaias Piedra M.D.   CONSULTING PHYSICIAN: Maryuri Fernandez M.D.    PATIENT ACCOUNT#:   [de-identified]    LOCATION:  [address] [place] [address]  MEDICAL RECORD #:   [id number]       DATE OF BIRTH to maybe have a facial droop, he was largely unresponsive, his vital signs were actually fairly stable. His EKG was normal.  He gradually improved with simple supportive care and it was ultimately interpreted as a vasovagal episode.   He was admitted [facility] He is the third oldest of 8 siblings. He had a brother who recently  in [facility], and the patient had to arrange for his remains to be brought back to the [place]. The patient has a sister who  of breast cancer at the age of 44.   He has a sis ill before and his coping mechanisms are not very effective. We had him in touch with our social workers and we will continue to encourage this kind of interactions. 2.   Abdominal cramping. This is a new issue over the weekend.   Given the fact that rec

## 2017-09-12 NOTE — PROGRESS NOTES
Full consult dictated. Pt with T3 N0 rectal cancer. Started Chemo Rt on August 31st.  Has had a great deal of anxiety. Tumor is not nearly obstructing. Has been on capecitabine BID and has had a variety of symptoms.   Initially complained of SOB related significant blood count problems, hand foot issues or oral mucositis, suggesting that he is not likely DPD deficient. Will advance diet after CT.     3)  Rash - this is likely from capecitabine - mild - topical moisturizers and low strength steroids if nee

## 2017-09-12 NOTE — PROGRESS NOTES
Met with the patient, reviewed discharge with the patient.   Plan to resume radiation tomorrow  Resume xeloda tomorrow 1500mg twice daily    Take norco for pain, may take prior to radiation    Take bentyl for cramping as needed    May use xanax at night huang

## 2017-09-12 NOTE — PAYOR COMM NOTE
--------------  ADMISSION REVIEW     Payor: Hank Robbins S #:  [id number]  Authorization Number: N/A    Admit date: N/A  Admit time: N/A       Admitting Physician: Marsha Meraz MD  Attending Physician:  Marsha Meraz MD  Primary Care Physician radiation  Other systems are as noted in HPI. Constitutional and vital signs reviewed. All other systems reviewed and negative except as noted above. PSFH elements reviewed from today and agreed except as otherwise stated in HPI.     Physical Exam[SY normal EKG    Initially after seeing patient when he got wheeled into the department I was very concerned that he may be having an acute stroke however after getting him lay down flat he started to come around much quicker and states was feeling much dimitri contributed to the problem he definitely did seem to be slightly hyperventilating when he initially arrived. ED Course  MDM[SY.1]   Patient will be admitted for further observation and workup[SY. 2]    Disposition and Plan   Clinical Impression:[SY.1]  Syn face.[DK.2]    Past Medical History:  As noted above in ED MD assessment     Allergies: No Known Allergies    Review of Systems:   A comprehensive 14 point review of systems was completed. Pertinent positives and negatives noted in the HPI.     Physical vitals  2. Rectal cancer, completed radiation therapy, oncology to see  3. Abdominal pain, diarrhea, likely due to proctitis  1. Pain control  2. Immodium PRN  4. Hypokalemia, replace  5. Dehydration, IVF  6.  Rash, involving torso, extremities, and face, a

## 2017-09-12 NOTE — PLAN OF CARE
Pt AOx4. RA. VSS. Afebrile. Pt denies pain. Pt c/o abd bloating/cramping, denied medication. Pt requested to take a shower, IV wrapped, shower taken. Pt has radiation appt scheduled for 9/12/17 at 8am at [facility]  Pt concerned he will miss

## 2017-09-12 NOTE — PLAN OF CARE
Patient was brought back from CT,radiology RN called informing nurse that patient didn't have his CT done since patient was diaphoretic & weak therefore was sent back tot he floor. Vital signs stable. Blod sugar within normal range although patient was kept

## 2017-09-12 NOTE — PLAN OF CARE
NURSING DISCHARGE NOTE    Discharged to home via Wheelchair. Accompanied by RN  Belongings Taken by patient/family.

## 2017-09-13 ENCOUNTER — TELEPHONE (OUTPATIENT)
Dept: HEMATOLOGY/ONCOLOGY | Facility: HOSPITAL | Age: 57
End: 2017-09-13

## 2017-09-13 PROCEDURE — 77386 HC IMRT COMPLEX: CPT | Performed by: RADIOLOGY

## 2017-09-13 NOTE — TELEPHONE ENCOUNTER
Spoke with patient by phone. He did get very weak and lightheaded after radiation today. Bentyl did help with abdominal cramping  Diarrhea last night and today, tried xanax no obvious improvement, but had so much diarrhea.   Took imodium today, one tabl

## 2017-09-14 ENCOUNTER — OFFICE VISIT (OUTPATIENT)
Dept: HEMATOLOGY/ONCOLOGY | Age: 57
End: 2017-09-14
Attending: INTERNAL MEDICINE
Payer: COMMERCIAL

## 2017-09-14 ENCOUNTER — NURSE ONLY (OUTPATIENT)
Dept: HEMATOLOGY/ONCOLOGY | Age: 57
End: 2017-09-14
Attending: INTERNAL MEDICINE
Payer: COMMERCIAL

## 2017-09-14 ENCOUNTER — SOCIAL WORK SERVICES (OUTPATIENT)
Dept: HEMATOLOGY/ONCOLOGY | Facility: HOSPITAL | Age: 57
End: 2017-09-14

## 2017-09-14 VITALS
DIASTOLIC BLOOD PRESSURE: 85 MMHG | TEMPERATURE: 97 F | OXYGEN SATURATION: 98 % | SYSTOLIC BLOOD PRESSURE: 124 MMHG | RESPIRATION RATE: 18 BRPM | HEART RATE: 64 BPM

## 2017-09-14 DIAGNOSIS — R53.1 WEAKNESS GENERALIZED: ICD-10-CM

## 2017-09-14 DIAGNOSIS — C20 RECTAL CANCER (HCC): ICD-10-CM

## 2017-09-14 DIAGNOSIS — C20 RECTAL CANCER (HCC): Primary | ICD-10-CM

## 2017-09-14 DIAGNOSIS — R10.9 ABDOMINAL CRAMPING: Primary | ICD-10-CM

## 2017-09-14 PROCEDURE — 99214 OFFICE O/P EST MOD 30 MIN: CPT | Performed by: NURSE PRACTITIONER

## 2017-09-14 PROCEDURE — 96361 HYDRATE IV INFUSION ADD-ON: CPT

## 2017-09-14 PROCEDURE — 77386 HC IMRT COMPLEX: CPT | Performed by: RADIOLOGY

## 2017-09-14 PROCEDURE — 96360 HYDRATION IV INFUSION INIT: CPT

## 2017-09-14 NOTE — PROGRESS NOTES
NUTRITION F/U NOTE:     DX: rectal cancer  TX: CONCURRENT XELODA/RT    RECENT HX: pt recently inpt w/ syncopal episode and abdominal cramping    WT HX:   09/11/17: 181 lbs  09/05/17: 182 lbs 8 oz  08/23/17: 185 lbs    Estimated Nutrition Needs: 30-35 kcals

## 2017-09-14 NOTE — PATIENT INSTRUCTIONS
Education Record    Learner:  Patient and Spouse    Disease / Diagnosis:IV hydration    Barriers / Limitations:  None   Comments:    Method:  Brief focused   Comments:    General Topics:  Infection, Medication, Pain, Precautions, Procedure, Side effects an

## 2017-09-14 NOTE — PROGRESS NOTES
CC:Patient presents with:  Weakness: patient on active chemo/RT for rectal cancer      HPI:   Jacquie Kay is a(n) 59year old male who is followed by Dr. Mao Guzman for rectal cancer.   He has had a rough start, he has been admitted and discharged last wee lymphadenopathy. Neck is supple. Chest: Clear to auscultation. Heart: Regular rate and rhythm. Abdomen: Soft, non tender with good bowel sounds. Extremities: Pedal pulses are present. No edema. Neurological: Grossly intact. Lymphatics:  There is no TYLER Dominguez  9/14/2017

## 2017-09-14 NOTE — PROGRESS NOTES
SW discussed patient with Dr. Raffy Mcduffie, Radiation RN, Dr. Laurie Okeefe and Szymanski Child, NP.   SW met with patient's wife.   Wife states they have longstanding financial problems and have been planning to move from their house when the las

## 2017-09-15 PROCEDURE — 77386 HC IMRT COMPLEX: CPT | Performed by: RADIOLOGY

## 2017-09-15 PROCEDURE — 77336 RADIATION PHYSICS CONSULT: CPT | Performed by: RADIOLOGY

## 2017-09-18 ENCOUNTER — HOSPITAL ENCOUNTER (OUTPATIENT)
Dept: RADIATION ONCOLOGY | Age: 57
Discharge: HOME | End: 2017-09-18
Attending: RADIOLOGY
Payer: COMMERCIAL

## 2017-09-18 VITALS
HEART RATE: 68 BPM | RESPIRATION RATE: 20 BRPM | BODY MASS INDEX: 26 KG/M2 | WEIGHT: 183.88 LBS | OXYGEN SATURATION: 98 % | TEMPERATURE: 96 F | DIASTOLIC BLOOD PRESSURE: 73 MMHG | SYSTOLIC BLOOD PRESSURE: 113 MMHG

## 2017-09-18 DIAGNOSIS — K62.7 ACUTE RADIATION PROCTITIS: ICD-10-CM

## 2017-09-18 DIAGNOSIS — C20 RECTAL CANCER (HCC): Primary | ICD-10-CM

## 2017-09-18 PROCEDURE — 77386 HC IMRT COMPLEX: CPT | Performed by: RADIOLOGY

## 2017-09-18 NOTE — PROGRESS NOTES
[facility] Radiation Treatment Management Note 11-15    Patient:  Kelly Harrington  Age:  59year old  Visit Diagnosis:    1.  Rectal cancer ([address]      Primary Rad/Onc:  Dr. Shaina Valle    Site Delivered Dose (Gy) Prescribed Dose (Gy

## 2017-09-19 PROCEDURE — 77386 HC IMRT COMPLEX: CPT | Performed by: RADIOLOGY

## 2017-09-20 PROCEDURE — 77386 HC IMRT COMPLEX: CPT | Performed by: RADIOLOGY

## 2017-09-21 ENCOUNTER — OFFICE VISIT (OUTPATIENT)
Dept: HEMATOLOGY/ONCOLOGY | Age: 57
End: 2017-09-21
Attending: INTERNAL MEDICINE
Payer: COMMERCIAL

## 2017-09-21 ENCOUNTER — NURSE ONLY (OUTPATIENT)
Dept: HEMATOLOGY/ONCOLOGY | Age: 57
End: 2017-09-21
Attending: INTERNAL MEDICINE
Payer: COMMERCIAL

## 2017-09-21 VITALS
WEIGHT: 184.81 LBS | BODY MASS INDEX: 27 KG/M2 | HEART RATE: 64 BPM | RESPIRATION RATE: 18 BRPM | TEMPERATURE: 98 F | OXYGEN SATURATION: 98 % | SYSTOLIC BLOOD PRESSURE: 127 MMHG | DIASTOLIC BLOOD PRESSURE: 73 MMHG

## 2017-09-21 DIAGNOSIS — C20 RECTAL CANCER (HCC): Primary | ICD-10-CM

## 2017-09-21 LAB
ALBUMIN SERPL-MCNC: 3.4 G/DL (ref 3.5–4.8)
ALP LIVER SERPL-CCNC: 90 U/L (ref 45–117)
ALT SERPL-CCNC: 67 U/L (ref 17–63)
AST SERPL-CCNC: 50 U/L (ref 15–41)
BASOPHILS # BLD AUTO: 0.02 X10(3) UL (ref 0–0.1)
BASOPHILS NFR BLD AUTO: 0.6 %
BILIRUB SERPL-MCNC: 0.5 MG/DL (ref 0.1–2)
BUN BLD-MCNC: 12 MG/DL (ref 8–20)
CALCIUM BLD-MCNC: 8.8 MG/DL (ref 8.3–10.3)
CHLORIDE: 101 MMOL/L (ref 101–111)
CO2: 31 MMOL/L (ref 22–32)
CREAT BLD-MCNC: 1.15 MG/DL (ref 0.7–1.3)
EOSINOPHIL # BLD AUTO: 0.13 X10(3) UL (ref 0–0.3)
EOSINOPHIL NFR BLD AUTO: 3.7 %
ERYTHROCYTE [DISTWIDTH] IN BLOOD BY AUTOMATED COUNT: 14 % (ref 11.5–16)
GLUCOSE BLD-MCNC: 77 MG/DL (ref 70–99)
HCT VFR BLD AUTO: 43.9 % (ref 37–53)
HGB BLD-MCNC: 15.3 G/DL (ref 13–17)
IMMATURE GRANULOCYTE COUNT: 0.01 X10(3) UL (ref 0–1)
IMMATURE GRANULOCYTE RATIO %: 0.3 %
LYMPHOCYTES # BLD AUTO: 0.41 X10(3) UL (ref 0.9–4)
LYMPHOCYTES NFR BLD AUTO: 11.8 %
M PROTEIN MFR SERPL ELPH: 7.1 G/DL (ref 6.1–8.3)
MCH RBC QN AUTO: 32.7 PG (ref 27–33.2)
MCHC RBC AUTO-ENTMCNC: 34.9 G/DL (ref 31–37)
MCV RBC AUTO: 93.8 FL (ref 80–99)
MONOCYTES # BLD AUTO: 0.62 X10(3) UL (ref 0.1–0.6)
MONOCYTES NFR BLD AUTO: 17.9 %
NEUTROPHIL ABS PRELIM: 2.28 X10 (3) UL (ref 1.3–6.7)
NEUTROPHILS # BLD AUTO: 2.28 X10(3) UL (ref 1.3–6.7)
NEUTROPHILS NFR BLD AUTO: 65.7 %
PLATELET # BLD AUTO: 146 10(3)UL (ref 150–450)
POTASSIUM SERPL-SCNC: 4.4 MMOL/L (ref 3.6–5.1)
RBC # BLD AUTO: 4.68 X10(6)UL (ref 4.3–5.7)
RED CELL DISTRIBUTION WIDTH-SD: 42.5 FL (ref 35.1–46.3)
SODIUM SERPL-SCNC: 137 MMOL/L (ref 136–144)
WBC # BLD AUTO: 3.5 X10(3) UL (ref 4–13)

## 2017-09-21 PROCEDURE — 77386 HC IMRT COMPLEX: CPT | Performed by: RADIOLOGY

## 2017-09-21 PROCEDURE — 99214 OFFICE O/P EST MOD 30 MIN: CPT | Performed by: NURSE PRACTITIONER

## 2017-09-21 NOTE — PROGRESS NOTES
NUTRITION F/U NOTE:      DX: rectal cancer  TX: CONCURRENT XELODA/RT     WT HX:   09/21/17: 184 lbs 12.8 oz  09/18/17: 183 lbs 14.4 oz  09/11/17: 181 lbs  09/05/17: 182 lbs 8 oz  08/23/17: 185 lbs     Estimated Nutrition Needs: 30-35 kcals/kg PBW = 2500-29

## 2017-09-22 PROCEDURE — 77336 RADIATION PHYSICS CONSULT: CPT | Performed by: RADIOLOGY

## 2017-09-22 PROCEDURE — 77386 HC IMRT COMPLEX: CPT | Performed by: RADIOLOGY

## 2017-09-25 ENCOUNTER — HOSPITAL ENCOUNTER (OUTPATIENT)
Dept: RADIATION ONCOLOGY | Age: 57
Discharge: HOME | End: 2017-09-25
Attending: RADIOLOGY
Payer: COMMERCIAL

## 2017-09-25 VITALS
BODY MASS INDEX: 26 KG/M2 | WEIGHT: 182.88 LBS | HEART RATE: 80 BPM | DIASTOLIC BLOOD PRESSURE: 80 MMHG | SYSTOLIC BLOOD PRESSURE: 127 MMHG | RESPIRATION RATE: 20 BRPM | OXYGEN SATURATION: 100 % | TEMPERATURE: 98 F

## 2017-09-25 DIAGNOSIS — C20 RECTAL CANCER (HCC): Primary | ICD-10-CM

## 2017-09-25 PROCEDURE — 77386 HC IMRT COMPLEX: CPT | Performed by: RADIOLOGY

## 2017-09-25 NOTE — PROGRESS NOTES
[facility] Radiation Treatment Management Note 16-20    Patient:  Rocio Abebe  Age:  59year old  Visit Diagnosis:    1.  Rectal cancer ([address]      Primary Rad/Onc:  Dr. Milus Soulier    Site Delivered Dose (Gy) Prescribed Dose (Gy with some bleeding- using HC 1% OTC (Analpram script too expensive). No new  c/o. He feels rash is worsening. Cont to take Xeloda daily, no c/o nausea. On exam, pt is AAO. Not in pain.  +rash to face, B/L UE (sl worsened). VSS- no orthostasis.

## 2017-09-26 ENCOUNTER — APPOINTMENT (OUTPATIENT)
Dept: GENERAL RADIOLOGY | Age: 57
End: 2017-09-26
Attending: EMERGENCY MEDICINE
Payer: COMMERCIAL

## 2017-09-26 ENCOUNTER — MEDICAL CORRESPONDENCE (OUTPATIENT)
Dept: RADIATION ONCOLOGY | Age: 57
End: 2017-09-26

## 2017-09-26 ENCOUNTER — HOSPITAL ENCOUNTER (EMERGENCY)
Age: 57
Discharge: HOME | End: 2017-09-26
Attending: EMERGENCY MEDICINE
Payer: COMMERCIAL

## 2017-09-26 ENCOUNTER — NURSE ONLY (OUTPATIENT)
Dept: RADIATION ONCOLOGY | Age: 57
End: 2017-09-26

## 2017-09-26 VITALS
DIASTOLIC BLOOD PRESSURE: 73 MMHG | HEART RATE: 64 BPM | SYSTOLIC BLOOD PRESSURE: 110 MMHG | RESPIRATION RATE: 16 BRPM | WEIGHT: 182 LBS | OXYGEN SATURATION: 100 % | TEMPERATURE: 98 F | BODY MASS INDEX: 26.05 KG/M2 | HEIGHT: 70 IN

## 2017-09-26 DIAGNOSIS — C20 RECTAL CANCER (HCC): ICD-10-CM

## 2017-09-26 DIAGNOSIS — F32.A DEPRESSION, UNSPECIFIED DEPRESSION TYPE: ICD-10-CM

## 2017-09-26 DIAGNOSIS — K64.9 HEMORRHOIDS, UNSPECIFIED HEMORRHOID TYPE: Primary | ICD-10-CM

## 2017-09-26 LAB
ACETAMINOPHEN: 2.9 UG/ML (ref ?–2)
ALBUMIN SERPL-MCNC: 3.4 G/DL (ref 3.5–4.8)
ALP LIVER SERPL-CCNC: 91 U/L (ref 45–117)
ALT SERPL-CCNC: 80 U/L (ref 17–63)
AMPHETAMINE URINE: NEGATIVE
APTT PPP: 26 SECONDS (ref 25–34)
AST SERPL-CCNC: 48 U/L (ref 15–41)
ATRIAL RATE: 68 BPM
BARBITURATES URINE: NEGATIVE
BASOPHILS # BLD AUTO: 0.02 X10(3) UL (ref 0–0.1)
BASOPHILS NFR BLD AUTO: 0.5 %
BENZODIAZEPINES URINE: NEGATIVE
BILIRUB SERPL-MCNC: 0.5 MG/DL (ref 0.1–2)
BILIRUB UR QL STRIP.AUTO: NEGATIVE
BUN BLD-MCNC: 10 MG/DL (ref 8–20)
CALCIUM BLD-MCNC: 9 MG/DL (ref 8.3–10.3)
CANNABINOID URINE: NEGATIVE
CHLORIDE: 102 MMOL/L (ref 101–111)
CLARITY UR REFRACT.AUTO: CLEAR
CO2: 30 MMOL/L (ref 22–32)
COCAINE URINE: NEGATIVE
COLOR UR AUTO: YELLOW
CREAT BLD-MCNC: 1.22 MG/DL (ref 0.7–1.3)
EOSINOPHIL # BLD AUTO: 0.12 X10(3) UL (ref 0–0.3)
EOSINOPHIL NFR BLD AUTO: 2.9 %
ERYTHROCYTE [DISTWIDTH] IN BLOOD BY AUTOMATED COUNT: 14.8 % (ref 11.5–16)
ETHYL ALCOHOL: <3 MG/DL (ref ?–3)
GLUCOSE BLD-MCNC: 107 MG/DL (ref 70–99)
GLUCOSE UR STRIP.AUTO-MCNC: NEGATIVE MG/DL
HCT VFR BLD AUTO: 42.7 % (ref 37–53)
HGB BLD-MCNC: 15 G/DL (ref 13–17)
IMMATURE GRANULOCYTE COUNT: 0.02 X10(3) UL (ref 0–1)
IMMATURE GRANULOCYTE RATIO %: 0.5 %
INR BLD: 1.08 (ref 0.89–1.12)
KETONES UR STRIP.AUTO-MCNC: NEGATIVE MG/DL
LEUKOCYTE ESTERASE UR QL STRIP.AUTO: NEGATIVE
LYMPHOCYTES # BLD AUTO: 0.39 X10(3) UL (ref 0.9–4)
LYMPHOCYTES NFR BLD AUTO: 9.5 %
M PROTEIN MFR SERPL ELPH: 7.2 G/DL (ref 6.1–8.3)
MCH RBC QN AUTO: 32.8 PG (ref 27–33.2)
MCHC RBC AUTO-ENTMCNC: 35.1 G/DL (ref 31–37)
MCV RBC AUTO: 93.2 FL (ref 80–99)
MONOCYTES # BLD AUTO: 0.67 X10(3) UL (ref 0.1–0.6)
MONOCYTES NFR BLD AUTO: 16.4 %
NEUTROPHIL ABS PRELIM: 2.87 X10 (3) UL (ref 1.3–6.7)
NEUTROPHILS # BLD AUTO: 2.87 X10(3) UL (ref 1.3–6.7)
NEUTROPHILS NFR BLD AUTO: 70.2 %
NITRITE UR QL STRIP.AUTO: NEGATIVE
P AXIS: 47 DEGREES
P-R INTERVAL: 170 MS
PCP URINE: NEGATIVE
PH UR STRIP.AUTO: 7.5 [PH] (ref 4.5–8)
PLATELET # BLD AUTO: 145 10(3)UL (ref 150–450)
POTASSIUM SERPL-SCNC: 3.7 MMOL/L (ref 3.6–5.1)
PROT UR STRIP.AUTO-MCNC: NEGATIVE MG/DL
PSA SERPL DL<=0.01 NG/ML-MCNC: 13.7 SECONDS (ref 11.8–14.1)
Q-T INTERVAL: 404 MS
QRS DURATION: 106 MS
QTC CALCULATION (BEZET): 429 MS
R AXIS: -13 DEGREES
RBC # BLD AUTO: 4.58 X10(6)UL (ref 4.3–5.7)
RBC UR QL AUTO: NEGATIVE
RED CELL DISTRIBUTION WIDTH-SD: 41.7 FL (ref 35.1–46.3)
SALICYLATE: <1.7 MG/DL (ref ?–1.7)
SODIUM SERPL-SCNC: 137 MMOL/L (ref 136–144)
SP GR UR STRIP.AUTO: 1.01 (ref 1–1.03)
T AXIS: 44 DEGREES
TROPONIN: <0.046 NG/ML (ref ?–0.05)
UROBILINOGEN UR STRIP.AUTO-MCNC: 0.2 MG/DL
VENTRICULAR RATE: 68 BPM
WBC # BLD AUTO: 4.1 X10(3) UL (ref 4–13)

## 2017-09-26 PROCEDURE — 85025 COMPLETE CBC W/AUTO DIFF WBC: CPT | Performed by: EMERGENCY MEDICINE

## 2017-09-26 PROCEDURE — 71010 XR CHEST AP PORTABLE  (CPT=71010): CPT | Performed by: EMERGENCY MEDICINE

## 2017-09-26 PROCEDURE — 80329 ANALGESICS NON-OPIOID 1 OR 2: CPT | Performed by: EMERGENCY MEDICINE

## 2017-09-26 PROCEDURE — 93010 ELECTROCARDIOGRAM REPORT: CPT

## 2017-09-26 PROCEDURE — 99285 EMERGENCY DEPT VISIT HI MDM: CPT

## 2017-09-26 PROCEDURE — 80053 COMPREHEN METABOLIC PANEL: CPT | Performed by: EMERGENCY MEDICINE

## 2017-09-26 PROCEDURE — 85730 THROMBOPLASTIN TIME PARTIAL: CPT | Performed by: EMERGENCY MEDICINE

## 2017-09-26 PROCEDURE — 85610 PROTHROMBIN TIME: CPT | Performed by: EMERGENCY MEDICINE

## 2017-09-26 PROCEDURE — 96374 THER/PROPH/DIAG INJ IV PUSH: CPT

## 2017-09-26 PROCEDURE — 77386 HC IMRT COMPLEX: CPT | Performed by: RADIOLOGY

## 2017-09-26 PROCEDURE — 81003 URINALYSIS AUTO W/O SCOPE: CPT | Performed by: EMERGENCY MEDICINE

## 2017-09-26 PROCEDURE — 80307 DRUG TEST PRSMV CHEM ANLYZR: CPT | Performed by: EMERGENCY MEDICINE

## 2017-09-26 PROCEDURE — 93005 ELECTROCARDIOGRAM TRACING: CPT

## 2017-09-26 PROCEDURE — 84484 ASSAY OF TROPONIN QUANT: CPT | Performed by: EMERGENCY MEDICINE

## 2017-09-26 PROCEDURE — 80320 DRUG SCREEN QUANTALCOHOLS: CPT | Performed by: EMERGENCY MEDICINE

## 2017-09-26 PROCEDURE — 80361 OPIATES 1 OR MORE: CPT | Performed by: EMERGENCY MEDICINE

## 2017-09-26 RX ORDER — HYDROCORTISONE ACETATE 30 MG/1
1 SUPPOSITORY RECTAL 2 TIMES DAILY
Qty: 28 SUPPOSITORY | Refills: 0 | Status: SHIPPED | OUTPATIENT
Start: 2017-09-26 | End: 2017-10-10

## 2017-09-26 RX ORDER — KETOROLAC TROMETHAMINE 30 MG/ML
30 INJECTION, SOLUTION INTRAMUSCULAR; INTRAVENOUS ONCE
Status: COMPLETED | OUTPATIENT
Start: 2017-09-26 | End: 2017-09-26

## 2017-09-26 NOTE — PROGRESS NOTES
Patient of Dr. Melissa Walker and Dr. Paulina Holcomb is a 59y old male with low lying rectal cancer who is receiving concurrent Xeloda and radiation therapy x 2.5 weeks. He has been extremely emotionally labile through treatment.     RT therapists called after XRT teri

## 2017-09-26 NOTE — PROGRESS NOTES
Pt had pelvic RT today, RN called by rad therapists to pt dressing room due to patient moaning in bathroom. Pt coming out of bathroom, lower extremities wobbly and pt c/o \"feeling faint\".  Placed pt on wheelchair, VS stable, pt awake but hardly responding

## 2017-09-26 NOTE — ED PROVIDER NOTES
Patient Seen in: [address] [facility] In [place]    History   Patient presents with:  Syncope (cardiovascular, neurologic)    Stated Complaint: NEAR SYNCOPE    HPI    Patient is a 47-year-old male who has a history of rectal cancer.   Patient is Sister      melanoma       Smoking status: Never Smoker                                                              Smokeless tobacco: Never Used                      Alcohol use:  No               Comment: one per month/ stated none      Review of Systems ACETAMINOPHEN (TYLENOL), S - Abnormal; Notable for the following:     Acetaminophen 2.9 (*)     All other components within normal limits   DRUG SCREEN 7 W/CONFIRMATION, URINE - Abnormal; Notable for the following:     Opiate Urine Presumed Positive (*) Patient and family feel comfortable going home. Patient denies suicidal or homicidal ideation. Patient will follow up with the notes referrals as well as primary physician and oncologist.  Recommend return if suicidal homicidal new or worse symptoms.

## 2017-09-26 NOTE — ED NOTES
Caitlyn Kenny #[id number]  (55 year old M)     PF ED-02-02   Daly Oconnor [facility] Counselor Signed   [address] Level of Care Assessment Date of Service: 9/26/2017  3:43 PM   Related encounter: Assessment from 9/26/2017 in [facility] Precipitating Events: PT IS IN [place] FOR RECTAL CANCER. ON SATURDAY WAS UNDER A TREMENDOUS AMOUNT OF STRESS. PT & WIFE DROVE TO ANOTHER DAUGHTER'S TRACK DAUGHTER HAD COMPETITION  IN [place]. HAD TO GET UP EARLY, WAS VERY HOT, FELT SICK, WIFE DROVE.   [address] History of Present Illness: SINCE 2016. HAD BIOPSIES & TESTS DONE & THEN 71 YR OLD BROTHER IN [place] . FLEW TO [place] TO MAKE FINAL ARRANGEMENTS.  WAS VERY CLOSE TO BROTHER. BROTHER HAD BEEN DEAD x 3 DAYS.  ALONE IN HIS SLEEP.   POLICE HAD TO Past Suicidal Ideation: No  Past Suicide Plan: No  Past Suicide Intent: No  Past Suicide Rehearsal: No  Past Suicide Attempt: No  Past Suicide Risk Mitigating Factors: PT DENIES ANY PRIOR HX OF SI.    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

## 2017-09-26 NOTE — BH PROGRESS NOTE
[facility] aware of need for evaluation. Attempting to rearrange staffing.  aware it could be a bit of a wait.

## 2017-09-26 NOTE — ED INITIAL ASSESSMENT (HPI)
Sent here from Cancer center for near syncope during radiation. 10 days ago was also seen here for syncopal episode with stroke symptoms and admitted.   Pt alert but sleepy on arrival-- per cancer center, pt has been expressing suicidal thoughts at home ov

## 2017-09-27 PROCEDURE — 77386 HC IMRT COMPLEX: CPT | Performed by: RADIOLOGY

## 2017-09-27 PROCEDURE — 77338 DESIGN MLC DEVICE FOR IMRT: CPT | Performed by: RADIOLOGY

## 2017-09-27 PROCEDURE — 77300 RADIATION THERAPY DOSE PLAN: CPT | Performed by: RADIOLOGY

## 2017-09-28 ENCOUNTER — OFFICE VISIT (OUTPATIENT)
Dept: HEMATOLOGY/ONCOLOGY | Age: 57
End: 2017-09-28
Attending: INTERNAL MEDICINE
Payer: COMMERCIAL

## 2017-09-28 VITALS
WEIGHT: 182.81 LBS | RESPIRATION RATE: 16 BRPM | OXYGEN SATURATION: 100 % | TEMPERATURE: 97 F | DIASTOLIC BLOOD PRESSURE: 81 MMHG | BODY MASS INDEX: 26 KG/M2 | SYSTOLIC BLOOD PRESSURE: 130 MMHG | HEART RATE: 65 BPM

## 2017-09-28 DIAGNOSIS — R53.1 WEAKNESS GENERALIZED: ICD-10-CM

## 2017-09-28 DIAGNOSIS — C20 RECTAL CANCER (HCC): Primary | ICD-10-CM

## 2017-09-28 DIAGNOSIS — R21 MACULOPAPULAR RASH, GENERALIZED: ICD-10-CM

## 2017-09-28 DIAGNOSIS — R10.9 ABDOMINAL CRAMPING: ICD-10-CM

## 2017-09-28 DIAGNOSIS — F32.9 REACTIVE DEPRESSION: ICD-10-CM

## 2017-09-28 PROBLEM — K52.1 CHEMOTHERAPY INDUCED DIARRHEA: Status: ACTIVE | Noted: 2017-09-28

## 2017-09-28 PROBLEM — K62.7 RADIATION INDUCED PROCTITIS: Status: ACTIVE | Noted: 2017-09-28

## 2017-09-28 PROBLEM — T45.1X5A CHEMOTHERAPY INDUCED DIARRHEA: Status: ACTIVE | Noted: 2017-09-28

## 2017-09-28 LAB
OPIATES, UR, 6-ACETYLMORPHINE: <10 NG/ML
OPIATES, URINE, CODEINE: <20 NG/ML
OPIATES, URINE, HYDROCODONE: 225 NG/ML
OPIATES, URINE, HYDROMORPHONE: <20 NG/ML
OPIATES, URINE, MORPHINE: <20 NG/ML
OPIATES, URINE, NORHYDROCODONE: 130 NG/ML
OPIATES, URINE, NOROXYCODONE: <20 NG/ML
OPIATES, URINE, NOROXYMORPHONE: <20 NG/ML
OPIATES, URINE, OXYCODONE: <20 NG/ML
OPIATES, URINE, OXYMORPHONE: <20 NG/ML

## 2017-09-28 PROCEDURE — 99211 OFF/OP EST MAY X REQ PHY/QHP: CPT

## 2017-09-28 PROCEDURE — 77386 HC IMRT COMPLEX: CPT | Performed by: RADIOLOGY

## 2017-09-29 PROCEDURE — 77386 HC IMRT COMPLEX: CPT | Performed by: RADIOLOGY

## 2017-09-29 PROCEDURE — 77336 RADIATION PHYSICS CONSULT: CPT | Performed by: RADIOLOGY

## 2017-09-29 NOTE — PROGRESS NOTES
[facility]    PATIENT'S NAME: Vanessa    ATTENDING PHYSICIAN: Sharrie Councilman, M.D.    PATIENT ACCOUNT #: [de-identified] LOCATION: [address] [place] [address] RECORD #: [id number] YOB: 1960   DATE OF SERVICE: 09/28/2017       CANCER ARNIE Imodium and Lomotil. He also is having significant rectal pain with cramping and symptoms consistent with proctitis. He has only been taking 1 hydrocodone twice daily, and we again told him to increase these doses.   Over the weekend, he apparently was [facility] and anxiety that he has, and some of it is due to the physical symptomatology. We went through the antimotility drugs in details. He is to take Imodium 2-4 mg q.i.d. for the diarrhea and add Lomotil 1-2 tablets q.i.d. p.r.n.   He also is to take 1 tablet

## 2017-10-01 ENCOUNTER — HOSPITAL ENCOUNTER (OUTPATIENT)
Dept: RADIATION ONCOLOGY | Age: 57
Discharge: HOME | End: 2017-10-01
Attending: RADIOLOGY
Payer: COMMERCIAL

## 2017-10-02 ENCOUNTER — HOSPITAL ENCOUNTER (OUTPATIENT)
Dept: RADIATION ONCOLOGY | Age: 57
Discharge: HOME | End: 2017-10-02
Attending: RADIOLOGY
Payer: COMMERCIAL

## 2017-10-02 ENCOUNTER — APPOINTMENT (OUTPATIENT)
Dept: HEMATOLOGY/ONCOLOGY | Age: 57
End: 2017-10-02
Attending: INTERNAL MEDICINE
Payer: COMMERCIAL

## 2017-10-02 VITALS
BODY MASS INDEX: 26 KG/M2 | RESPIRATION RATE: 20 BRPM | DIASTOLIC BLOOD PRESSURE: 80 MMHG | TEMPERATURE: 98 F | OXYGEN SATURATION: 100 % | WEIGHT: 178.69 LBS | SYSTOLIC BLOOD PRESSURE: 131 MMHG | HEART RATE: 77 BPM

## 2017-10-02 DIAGNOSIS — K62.7 RADIATION INDUCED PROCTITIS: ICD-10-CM

## 2017-10-02 DIAGNOSIS — C20 RECTAL CANCER (HCC): Primary | ICD-10-CM

## 2017-10-02 PROCEDURE — 77386 HC IMRT COMPLEX: CPT | Performed by: RADIOLOGY

## 2017-10-02 NOTE — PROGRESS NOTES
[facility] Radiation Treatment Management Note 21-25    Patient:  Miguel A Amaro  Age:  59year old  Visit Diagnosis:    1.  Rectal cancer ([address]      Primary Rad/Onc:  Dr. Lanney Leyden    Site Delivered Dose (Gy) Prescribed Dose (Gy fold.  No bleeding. Pt doing fair. Cont RT with Xeloda (7d/wk) as planned. Instructed to use Silvadene only at anal margin (where mucositis present) AFTER RT daily. Pt given Aloe Vesta for groins and gluteal fold. Ports reviewed and approved.   RTC

## 2017-10-03 ENCOUNTER — OFFICE VISIT (OUTPATIENT)
Dept: HEMATOLOGY/ONCOLOGY | Age: 57
End: 2017-10-03
Attending: NURSE PRACTITIONER
Payer: COMMERCIAL

## 2017-10-03 VITALS
RESPIRATION RATE: 18 BRPM | DIASTOLIC BLOOD PRESSURE: 79 MMHG | BODY MASS INDEX: 26 KG/M2 | SYSTOLIC BLOOD PRESSURE: 122 MMHG | TEMPERATURE: 98 F | WEIGHT: 182 LBS | OXYGEN SATURATION: 99 % | HEART RATE: 71 BPM

## 2017-10-03 DIAGNOSIS — Z71.9 ENCOUNTER FOR EDUCATION: ICD-10-CM

## 2017-10-03 DIAGNOSIS — R21 MACULOPAPULAR RASH, GENERALIZED: ICD-10-CM

## 2017-10-03 DIAGNOSIS — C20 RECTAL CANCER (HCC): ICD-10-CM

## 2017-10-03 DIAGNOSIS — C20 RECTAL CANCER (HCC): Primary | ICD-10-CM

## 2017-10-03 DIAGNOSIS — R19.7 DIARRHEA: ICD-10-CM

## 2017-10-03 DIAGNOSIS — R53.1 WEAKNESS GENERALIZED: Primary | ICD-10-CM

## 2017-10-03 PROCEDURE — 99214 OFFICE O/P EST MOD 30 MIN: CPT | Performed by: NURSE PRACTITIONER

## 2017-10-03 PROCEDURE — 96360 HYDRATION IV INFUSION INIT: CPT

## 2017-10-03 PROCEDURE — 77386 HC IMRT COMPLEX: CPT | Performed by: RADIOLOGY

## 2017-10-03 PROCEDURE — 96361 HYDRATE IV INFUSION ADD-ON: CPT

## 2017-10-03 RX ORDER — HYDROCODONE BITARTRATE AND ACETAMINOPHEN 10; 325 MG/1; MG/1
1 TABLET ORAL ONCE
Status: CANCELLED
Start: 2017-10-03 | End: 2017-10-03

## 2017-10-03 RX ORDER — LOPERAMIDE HYDROCHLORIDE 2 MG/1
4 CAPSULE ORAL ONCE
Status: DISCONTINUED | OUTPATIENT
Start: 2017-10-03 | End: 2017-10-03

## 2017-10-03 RX ORDER — HYDROCODONE BITARTRATE AND ACETAMINOPHEN 10; 325 MG/1; MG/1
1 TABLET ORAL ONCE
Status: COMPLETED | OUTPATIENT
Start: 2017-10-03 | End: 2017-10-03

## 2017-10-03 RX ADMIN — HYDROCODONE BITARTRATE AND ACETAMINOPHEN 1 TABLET: 10; 325 TABLET ORAL at 09:50:00

## 2017-10-03 NOTE — PROGRESS NOTES
Pt to bathroom several times with small episode of diarrhea. Pt states he did not take imodium yet today however, has it at home. [facility]  Upon arriving to pt room, caregiver had already given patient 2 tabs from [facility]

## 2017-10-03 NOTE — PROGRESS NOTES
ANP Visit Note    Patient Name: Tammy Leggett   YOB: 1960   Medical Record Number: [id number]   CSN: [id number]   Date of visit: 10/3/2017       Chief Complaint/Reason for Visit:  Follow up Alexandrea Latham Cancer/ Dehydration/ Diarrhea     History Surgeon: Margoth Negrete MD;  Location: [address] [place] [address]  No date: OTHER SURGICAL HISTORY      Comment: left shoulder surgery football injury in HS    Allergies:  No Known Allergies    Family History:  Family History   Problem Relati 90 tablet, Rfl: 0  •  Hydrocortisone Acetate (PROCTOCORT) 30 MG Rectal Suppos, Place 1 suppository (30 mg total) rectally 2 (two) times daily. , Disp: 28 suppository, Rfl: 0  •  hydrocortisone 2.5 % External Ointment, Apply 1 Application topically 2 (two) t Date: 09/26/2017  Value: 106         Ref range: ms                 Status: Final  Q-T Interval                                  Date: 09/26/2017  Value: 404         Ref range: ms                 Status: Final  QTC Calculation (Kike) 09/26/2017  Value: 7.2         Ref range: 6.1 - 8.3 g/dL     Status: Final  Albumin                                       Date: 09/26/2017  Value: 3.4*        Ref range: 3.5 - 4.8 g/dL     Status: Final  Sodium                                        Date: Positive                     Ref range: Negative           Status: Final  PT                                            Date: 09/26/2017  Value: 13.7        Ref range: 11.8 - 14.1 seco*  Status: Final  INR                                           Date: 09 Ref range: Negative           Status: Final  Microscopic                                   Date: 09/26/2017  Value: Microscopic not indicated                       Status: Final  Troponin                                      Date: 09/26/2017  Value: <0.046 x10(*  Status: Final  Monocyte Absolute                             Date: 09/26/2017  Value: 0.67*       Ref range: 0.10 - 0.60 x10(*  Status: Final  Eosinophil Absolute                           Date: 09/26/2017  Value: 0.12        Ref range: 0.00 - 0.30 Final  Opiates, Urine, Morhpine                      Date: 09/28/2017  Value: <20         Ref range: ng/mL              Status: Final  Opiates, Ur, 6-Acetylmorphine                 Date: 09/28/2017  Value: <10         Ref range: ng/mL              Status: Final  ------------    Impression/Plan:    Rectal cancer: Oral Capecitabine and radiation therapy    Weakness: hydration today in clinic    Burning pain:  Silvadene and Norco, will given dose in clinic today and discussed with neighbor and patient rick

## 2017-10-04 PROCEDURE — 77386 HC IMRT COMPLEX: CPT | Performed by: RADIOLOGY

## 2017-10-05 ENCOUNTER — APPOINTMENT (OUTPATIENT)
Dept: HEMATOLOGY/ONCOLOGY | Age: 57
End: 2017-10-05
Attending: INTERNAL MEDICINE
Payer: COMMERCIAL

## 2017-10-05 ENCOUNTER — OFFICE VISIT (OUTPATIENT)
Dept: HEMATOLOGY/ONCOLOGY | Age: 57
End: 2017-10-05
Attending: INTERNAL MEDICINE
Payer: COMMERCIAL

## 2017-10-05 ENCOUNTER — NURSE ONLY (OUTPATIENT)
Dept: HEMATOLOGY/ONCOLOGY | Age: 57
End: 2017-10-05
Attending: INTERNAL MEDICINE
Payer: COMMERCIAL

## 2017-10-05 VITALS
DIASTOLIC BLOOD PRESSURE: 73 MMHG | OXYGEN SATURATION: 100 % | SYSTOLIC BLOOD PRESSURE: 108 MMHG | HEART RATE: 75 BPM | RESPIRATION RATE: 18 BRPM | WEIGHT: 182 LBS | TEMPERATURE: 98 F | BODY MASS INDEX: 26 KG/M2

## 2017-10-05 DIAGNOSIS — R53.1 WEAKNESS GENERALIZED: ICD-10-CM

## 2017-10-05 DIAGNOSIS — R21 MACULOPAPULAR RASH, GENERALIZED: ICD-10-CM

## 2017-10-05 DIAGNOSIS — C20 RECTAL CANCER (HCC): Primary | ICD-10-CM

## 2017-10-05 PROCEDURE — 77386 HC IMRT COMPLEX: CPT | Performed by: RADIOLOGY

## 2017-10-05 PROCEDURE — 99214 OFFICE O/P EST MOD 30 MIN: CPT | Performed by: INTERNAL MEDICINE

## 2017-10-05 NOTE — PATIENT INSTRUCTIONS
POST-RADIATION INSTRUCTIONS:   - CALL [phone] FOR A FOLLOW-UP WITH DR. HATCH 1 MONTH AFTER YOUR SURGERY  - CALL TO FOLLOW-UP WITH DR. SOTO  AT [facility] AS RECOMMENDED  - SIDE EFFECTS OF RADIATION W

## 2017-10-05 NOTE — DISCHARGE SUMMARY
[facility] HOSPITALIST  DISCHARGE SUMMARY     [address] [facility],[address] Patient Status:  Observation    1960 MRN [id number]   [facility] 4NW-A Attending Kathy Chen MD   Hosp Day # 0 PCP SUSIE MISTRY DO     Date of Admission: 2017 from the cancer center due to vasovagal syncope. It was felt that he had a syncopal episode due to underlying anxiety as well as significant abdominal pain. He was seen by oncology in consultation.   He underwent a CT abdomen/pelvis which was overall unre Prescription details   Prochlorperazine Maleate 10 mg tablet  Commonly known as:  COMPAZINE      Take 1 tablet (10 mg total) by mouth every 6 (six) hours as needed for Nausea.    Quantity:  30 tablet  Refills:  3           Where to Get Your Medications

## 2017-10-06 ENCOUNTER — APPOINTMENT (OUTPATIENT)
Dept: RADIATION ONCOLOGY | Age: 57
End: 2017-10-06
Attending: RADIOLOGY
Payer: COMMERCIAL

## 2017-10-06 ENCOUNTER — TELEPHONE (OUTPATIENT)
Dept: HEMATOLOGY/ONCOLOGY | Facility: HOSPITAL | Age: 57
End: 2017-10-06

## 2017-10-06 PROCEDURE — 77336 RADIATION PHYSICS CONSULT: CPT | Performed by: RADIOLOGY

## 2017-10-06 PROCEDURE — 77386 HC IMRT COMPLEX: CPT | Performed by: RADIOLOGY

## 2017-10-06 NOTE — TELEPHONE ENCOUNTER
----- Message from Nasir Stuart MD sent at 10/5/2017  6:20 PM CDT -----  Please let Cayla Eller or/and his wife know that they need to call Dr Meaghan Garrido office at [facility] to see him the second week of November. I spoke with him.   Rohith Seth

## 2017-10-06 NOTE — PROGRESS NOTES
[facility]    PATIENT'S NAME: Renee Angelica   ATTENDING PHYSICIAN: Mary Rodarte M.D.    PATIENT ACCOUNT #: [de-identified] LOCATION: [address] [place] [address] RECORD #: [id number] YOB: 1960   DATE OF SERVICE: 10/05/2017       CANCER ARNIE EXAMINATION:    GENERAL:  He is a little more alert today, sitting up in a wheelchair. He is able to walk. VITAL SIGNS:  His performance status is 1-2. His weight is 182 pounds, which is actually minimally changed.   Blood pressure is 108/73, pulse 75,

## 2017-10-09 ENCOUNTER — HOSPITAL ENCOUNTER (OUTPATIENT)
Dept: RADIATION ONCOLOGY | Age: 57
Discharge: HOME | End: 2017-10-09
Attending: RADIOLOGY
Payer: COMMERCIAL

## 2017-10-09 VITALS
DIASTOLIC BLOOD PRESSURE: 80 MMHG | BODY MASS INDEX: 26 KG/M2 | HEART RATE: 70 BPM | TEMPERATURE: 98 F | SYSTOLIC BLOOD PRESSURE: 134 MMHG | OXYGEN SATURATION: 100 % | RESPIRATION RATE: 20 BRPM | WEIGHT: 180.38 LBS

## 2017-10-09 DIAGNOSIS — C20 RECTAL CANCER (HCC): Primary | ICD-10-CM

## 2017-10-09 PROCEDURE — 77386 HC IMRT COMPLEX: CPT | Performed by: RADIOLOGY

## 2017-10-09 RX ORDER — DIPHENOXYLATE HYDROCHLORIDE AND ATROPINE SULFATE 2.5; .025 MG/1; MG/1
TABLET ORAL
Refills: 4 | COMMUNITY
Start: 2017-10-03 | End: 2017-11-08

## 2017-10-09 NOTE — PROGRESS NOTES
[facility] Radiation Treatment Management Note 26-30    Patient:  Tammy Leggett  Age:  59year old  Visit Diagnosis:    1.  Rectal cancer ([facility]      Primary Rad/Onc:  Dr. Claudia Washburn    Site Delivered Dose (Gy) Prescribed Dose (Gy with erythema, no desquam.  VSS- no orthostasis. Anal exam shows no hemorrhoid, but mass extending outward with + mucositis. No bleeding. Pt doing fair. Cont RT - on boost as planned.   Instructed to use Silvadene at anal margin (where mucositis pre

## 2017-10-10 PROCEDURE — 77386 HC IMRT COMPLEX: CPT | Performed by: RADIOLOGY

## 2017-10-11 ENCOUNTER — DOCUMENTATION ONLY (OUTPATIENT)
Dept: RADIATION ONCOLOGY | Facility: HOSPITAL | Age: 57
End: 2017-10-11

## 2017-10-11 PROCEDURE — 77386 HC IMRT COMPLEX: CPT | Performed by: RADIOLOGY

## 2017-10-13 PROCEDURE — 77336 RADIATION PHYSICS CONSULT: CPT | Performed by: RADIOLOGY

## 2017-11-08 ENCOUNTER — HOSPITAL ENCOUNTER (OUTPATIENT)
Dept: RADIATION ONCOLOGY | Facility: HOSPITAL | Age: 57
Discharge: HOME | End: 2017-11-08
Attending: RADIOLOGY
Payer: COMMERCIAL

## 2017-11-08 VITALS
BODY MASS INDEX: 26 KG/M2 | DIASTOLIC BLOOD PRESSURE: 70 MMHG | WEIGHT: 183.81 LBS | HEART RATE: 72 BPM | SYSTOLIC BLOOD PRESSURE: 120 MMHG

## 2017-11-08 DIAGNOSIS — C20 RECTAL CANCER (HCC): Primary | ICD-10-CM

## 2017-11-08 PROCEDURE — 99213 OFFICE O/P EST LOW 20 MIN: CPT

## 2017-11-08 NOTE — PROGRESS NOTES
Nursing Follow-Up Note    Patient: Manolo Srinivasan  YOB: 1960  Age: 59year old  Radiation Oncologist: Dr. Mary Broderick  Referring Physician: No ref. provider found  Chief Complaint: Patient presents with:   Follow - Up    Date: 11/8/2017

## 2017-11-08 NOTE — PROGRESS NOTES
[address] RADIATION ONCOLOGY FOLLOW UP    PATIENT:   Kim Humphrey      2/11/1960    DIAGNOSIS:   Rectal cancer      CANCER HISTORY:  53-year-old man with family history of rectal cancer in his mother.   Presented with rectal bleeding and lower G BP: 120/70   BP Location: Left arm   Patient Position: Sitting   Cuff Size: adult   Pulse: 72   Weight: 83.4 kg (183 lb 12.8 oz)   Well-appearing, no acute distress. No supraclavicular adenopathy. Heart shows regular rate and rhythm.   Abdomen soft and

## 2017-11-08 NOTE — PATIENT INSTRUCTIONS
- CALL [phone] FOR A FOLLOW-UP WITH DR. HATCH ONE MONTH AFTER YOUR SURGERY (JANUARY 2018)  - FOLLOW-UP WITH DR MCALLISTER/DR SOTO AS PLANNED  - CALL IF YOU HAVE ANY QUESTIONS/CONCERNS REGARDING RADIATION THERAPY 21

## 2017-11-10 NOTE — PROGRESS NOTES
[facility]    PATIENT:  Abdiel Cantrell MD: Dr. Migue Garcia    DIAGNOSIS:  Rectal cancer    CANCER HISTORY:  58-year-old man with family history of rectal cancer in his mother.   Presented with

## 2017-12-20 ENCOUNTER — TELEPHONE (OUTPATIENT)
Dept: FAMILY MEDICINE CLINIC | Facility: CLINIC | Age: 57
End: 2017-12-20

## 2017-12-20 NOTE — TELEPHONE ENCOUNTER
Someone from home health called to see if you will be the PCP for this pt I advised I don't see that pt has been seen here and doesn't have any appts at this time, she will fax info over advised I do see Dr. Reji Vo as PCP and just some med recs that were sen

## 2017-12-24 ENCOUNTER — APPOINTMENT (OUTPATIENT)
Dept: CT IMAGING | Facility: HOSPITAL | Age: 57
End: 2017-12-24
Attending: EMERGENCY MEDICINE
Payer: COMMERCIAL

## 2017-12-24 ENCOUNTER — HOSPITAL ENCOUNTER (EMERGENCY)
Facility: HOSPITAL | Age: 57
Discharge: ANOTHER HOSPITAL | End: 2017-12-24
Attending: EMERGENCY MEDICINE
Payer: COMMERCIAL

## 2017-12-24 VITALS
TEMPERATURE: 98 F | HEIGHT: 70 IN | SYSTOLIC BLOOD PRESSURE: 144 MMHG | HEART RATE: 92 BPM | BODY MASS INDEX: 25.77 KG/M2 | WEIGHT: 180 LBS | DIASTOLIC BLOOD PRESSURE: 86 MMHG | OXYGEN SATURATION: 99 % | RESPIRATION RATE: 18 BRPM

## 2017-12-24 DIAGNOSIS — K63.89 PNEUMATOSIS INTESTINALIS: Primary | ICD-10-CM

## 2017-12-24 PROCEDURE — 99285 EMERGENCY DEPT VISIT HI MDM: CPT

## 2017-12-24 PROCEDURE — 74177 CT ABD & PELVIS W/CONTRAST: CPT | Performed by: EMERGENCY MEDICINE

## 2017-12-24 PROCEDURE — 85025 COMPLETE CBC W/AUTO DIFF WBC: CPT | Performed by: EMERGENCY MEDICINE

## 2017-12-24 PROCEDURE — 83690 ASSAY OF LIPASE: CPT | Performed by: EMERGENCY MEDICINE

## 2017-12-24 PROCEDURE — 96374 THER/PROPH/DIAG INJ IV PUSH: CPT

## 2017-12-24 PROCEDURE — 96376 TX/PRO/DX INJ SAME DRUG ADON: CPT

## 2017-12-24 PROCEDURE — 96375 TX/PRO/DX INJ NEW DRUG ADDON: CPT

## 2017-12-24 PROCEDURE — 96361 HYDRATE IV INFUSION ADD-ON: CPT

## 2017-12-24 PROCEDURE — 81003 URINALYSIS AUTO W/O SCOPE: CPT | Performed by: EMERGENCY MEDICINE

## 2017-12-24 PROCEDURE — 80053 COMPREHEN METABOLIC PANEL: CPT | Performed by: EMERGENCY MEDICINE

## 2017-12-24 PROCEDURE — 83605 ASSAY OF LACTIC ACID: CPT | Performed by: EMERGENCY MEDICINE

## 2017-12-24 RX ORDER — METOCLOPRAMIDE HYDROCHLORIDE 5 MG/ML
10 INJECTION INTRAMUSCULAR; INTRAVENOUS ONCE
Status: COMPLETED | OUTPATIENT
Start: 2017-12-24 | End: 2017-12-24

## 2017-12-24 RX ORDER — LOPERAMIDE HCL 1 MG/7.5ML
2 SOLUTION ORAL 3 TIMES DAILY PRN
COMMUNITY
End: 2018-01-01 | Stop reason: ALTCHOICE

## 2017-12-24 RX ORDER — MORPHINE SULFATE 4 MG/ML
4 INJECTION, SOLUTION INTRAMUSCULAR; INTRAVENOUS ONCE
Status: COMPLETED | OUTPATIENT
Start: 2017-12-24 | End: 2017-12-24

## 2017-12-24 RX ORDER — ACETAMINOPHEN 325 MG/1
325 TABLET ORAL EVERY 6 HOURS PRN
COMMUNITY
End: 2018-08-02

## 2017-12-24 RX ORDER — ONDANSETRON 2 MG/ML
4 INJECTION INTRAMUSCULAR; INTRAVENOUS ONCE
Status: COMPLETED | OUTPATIENT
Start: 2017-12-24 | End: 2017-12-24

## 2017-12-24 RX ORDER — TRAMADOL HYDROCHLORIDE 50 MG/1
50 TABLET ORAL EVERY 6 HOURS PRN
COMMUNITY
End: 2018-01-25

## 2017-12-24 RX ORDER — ONDANSETRON 2 MG/ML
INJECTION INTRAMUSCULAR; INTRAVENOUS
Status: DISCONTINUED
Start: 2017-12-24 | End: 2017-12-24

## 2017-12-24 RX ORDER — HYDROMORPHONE HYDROCHLORIDE 1 MG/ML
1 INJECTION, SOLUTION INTRAMUSCULAR; INTRAVENOUS; SUBCUTANEOUS ONCE
Status: COMPLETED | OUTPATIENT
Start: 2017-12-24 | End: 2017-12-24

## 2017-12-24 NOTE — CM/SW NOTE
TC to [facility]. Spoke with Maybell Cogan. She informed ED CM that Dr. Cottie Nissen just called back and they will be working on a bed.

## 2017-12-24 NOTE — ED PROVIDER NOTES
Patient Seen in: [facility] Emergency Department    History   Patient presents with:  Dehydration (metabolic/constitutional)    Stated Complaint: dehydration/colon cancer    HPI    47-year-old male presents with abdominal pain and intractable vomiti one per month/ stated none      Review of Systems    Positive for stated complaint: dehydration/colon cancer  Other systems are as noted in HPI. Constitutional and vital signs reviewed. All other systems reviewed and negative except as noted above. All other components within normal limits   LIPASE - Normal   CBC WITH DIFFERENTIAL WITH PLATELET    Narrative: The following orders were created for panel order CBC WITH DIFFERENTIAL WITH PLATELET.   Procedure                               Abnormality are trace foci of gas within the abdominal wall, may be related to recent ostomy. The ostomy is present in the anterior right abdominal wall. The colon is decompressed. Normal caliber abdominal aorta.   Blood flow present within the celiac axis, superior to examine when he arrives. I informed the patient and family of the CT results and potential serious outcomes. Family is asking about the cost of transportation an ambulance to [facility]. They are asking if they can drive the patient.   I informed

## 2017-12-24 NOTE — ED INITIAL ASSESSMENT (HPI)
Surgery for rectal cancer 2 weeks ago,   Generalized pain to abd.  disharged yesterday,    Vomiting this am

## 2017-12-25 NOTE — CM/SW NOTE
Family very upset regarding ETA of 1910 for [facility]. Son just arrived and wants ED CM to attempt transport with another company. [facility] transport taking > 1 hour (per recent request). I contacted dispatch at [facility].  Because of weather and bad road cond

## 2017-12-25 NOTE — CM/SW NOTE
Asked by NIKO MARIA to contact [facility] center for tertiary care transfer.    I spoke with Erick Portillo the [facility]  and informed her that Dr. Jorge Peter had spoken with Dr. Alonzo Curry, colorectal surgeon covering for Dr. Osiel Scott, and Dr. Sandra Landry

## 2018-01-01 ENCOUNTER — ANESTHESIA (OUTPATIENT)
Dept: SURGERY | Facility: HOSPITAL | Age: 58
End: 2018-01-01

## 2018-01-01 ENCOUNTER — OFFICE VISIT (OUTPATIENT)
Dept: HEMATOLOGY/ONCOLOGY | Age: 58
End: 2018-01-01
Attending: INTERNAL MEDICINE
Payer: COMMERCIAL

## 2018-01-01 ENCOUNTER — OFFICE VISIT (OUTPATIENT)
Dept: HEMATOLOGY/ONCOLOGY | Facility: HOSPITAL | Age: 58
End: 2018-01-01
Attending: INTERNAL MEDICINE
Payer: COMMERCIAL

## 2018-01-01 ENCOUNTER — NURSE ONLY (OUTPATIENT)
Dept: HEMATOLOGY/ONCOLOGY | Age: 58
End: 2018-01-01
Attending: INTERNAL MEDICINE
Payer: COMMERCIAL

## 2018-01-01 ENCOUNTER — RESEARCH ENCOUNTER (OUTPATIENT)
Dept: HEMATOLOGY/ONCOLOGY | Facility: HOSPITAL | Age: 58
End: 2018-01-01

## 2018-01-01 ENCOUNTER — TELEPHONE (OUTPATIENT)
Dept: HEMATOLOGY/ONCOLOGY | Facility: HOSPITAL | Age: 58
End: 2018-01-01

## 2018-01-01 ENCOUNTER — ANESTHESIA EVENT (OUTPATIENT)
Dept: SURGERY | Facility: HOSPITAL | Age: 58
End: 2018-01-01

## 2018-01-01 ENCOUNTER — APPOINTMENT (OUTPATIENT)
Dept: CT IMAGING | Facility: HOSPITAL | Age: 58
End: 2018-01-01
Attending: EMERGENCY MEDICINE
Payer: COMMERCIAL

## 2018-01-01 ENCOUNTER — APPOINTMENT (OUTPATIENT)
Dept: CT IMAGING | Facility: HOSPITAL | Age: 58
DRG: 375 | End: 2018-01-01
Attending: PHYSICIAN ASSISTANT
Payer: COMMERCIAL

## 2018-01-01 ENCOUNTER — MED REC SCAN ONLY (OUTPATIENT)
Dept: FAMILY MEDICINE CLINIC | Facility: CLINIC | Age: 58
End: 2018-01-01

## 2018-01-01 ENCOUNTER — HOSPITAL ENCOUNTER (INPATIENT)
Facility: HOSPITAL | Age: 58
LOS: 2 days | Discharge: HOME | DRG: 690 | End: 2018-01-01
Attending: EMERGENCY MEDICINE | Admitting: STUDENT IN AN ORGANIZED HEALTH CARE EDUCATION/TRAINING PROGRAM
Payer: COMMERCIAL

## 2018-01-01 ENCOUNTER — ANESTHESIA (OUTPATIENT)
Dept: SURGERY | Facility: HOSPITAL | Age: 58
DRG: 375 | End: 2018-01-01
Payer: COMMERCIAL

## 2018-01-01 ENCOUNTER — HOSPITAL ENCOUNTER (OUTPATIENT)
Dept: ULTRASOUND IMAGING | Age: 58
Discharge: HOME | End: 2018-01-01
Attending: PHYSICIAN ASSISTANT
Payer: COMMERCIAL

## 2018-01-01 ENCOUNTER — ANESTHESIA EVENT (OUTPATIENT)
Dept: SURGERY | Facility: HOSPITAL | Age: 58
DRG: 375 | End: 2018-01-01
Payer: COMMERCIAL

## 2018-01-01 ENCOUNTER — HOSPITAL ENCOUNTER (OUTPATIENT)
Dept: CT IMAGING | Facility: HOSPITAL | Age: 58
Discharge: HOME | End: 2018-01-01
Attending: NURSE PRACTITIONER
Payer: COMMERCIAL

## 2018-01-01 ENCOUNTER — HOSPITAL ENCOUNTER (OUTPATIENT)
Dept: CT IMAGING | Facility: HOSPITAL | Age: 58
Discharge: HOME | End: 2018-01-01
Attending: INTERNAL MEDICINE
Payer: COMMERCIAL

## 2018-01-01 ENCOUNTER — APPOINTMENT (OUTPATIENT)
Dept: CT IMAGING | Facility: HOSPITAL | Age: 58
DRG: 690 | End: 2018-01-01
Attending: EMERGENCY MEDICINE
Payer: COMMERCIAL

## 2018-01-01 ENCOUNTER — HOSPITAL ENCOUNTER (OUTPATIENT)
Facility: HOSPITAL | Age: 58
Setting detail: HOSPITAL OUTPATIENT SURGERY
Discharge: HOME | End: 2018-01-01
Attending: UROLOGY | Admitting: UROLOGY
Payer: COMMERCIAL

## 2018-01-01 ENCOUNTER — APPOINTMENT (OUTPATIENT)
Dept: LAB | Facility: HOSPITAL | Age: 58
End: 2018-01-01
Attending: CLINICAL NURSE SPECIALIST
Payer: COMMERCIAL

## 2018-01-01 ENCOUNTER — HOSPITAL ENCOUNTER (OUTPATIENT)
Dept: INTERVENTIONAL RADIOLOGY/VASCULAR | Facility: HOSPITAL | Age: 58
Discharge: HOME | End: 2018-01-01
Attending: INTERNAL MEDICINE | Admitting: INTERNAL MEDICINE
Payer: COMMERCIAL

## 2018-01-01 ENCOUNTER — APPOINTMENT (OUTPATIENT)
Dept: HEMATOLOGY/ONCOLOGY | Age: 58
End: 2018-01-01
Attending: INTERNAL MEDICINE
Payer: COMMERCIAL

## 2018-01-01 ENCOUNTER — HOSPITAL ENCOUNTER (OUTPATIENT)
Facility: HOSPITAL | Age: 58
Setting detail: OBSERVATION
LOS: 1 days | Discharge: HOME | DRG: 375 | End: 2018-01-01
Attending: EMERGENCY MEDICINE | Admitting: INTERNAL MEDICINE
Payer: COMMERCIAL

## 2018-01-01 ENCOUNTER — HOSPITAL ENCOUNTER (EMERGENCY)
Facility: HOSPITAL | Age: 58
Discharge: HOME | End: 2018-01-01
Attending: EMERGENCY MEDICINE
Payer: COMMERCIAL

## 2018-01-01 ENCOUNTER — MED REC SCAN ONLY (OUTPATIENT)
Dept: HEMATOLOGY/ONCOLOGY | Facility: HOSPITAL | Age: 58
End: 2018-01-01

## 2018-01-01 ENCOUNTER — APPOINTMENT (OUTPATIENT)
Dept: GENERAL RADIOLOGY | Facility: HOSPITAL | Age: 58
DRG: 375 | End: 2018-01-01
Attending: UROLOGY
Payer: COMMERCIAL

## 2018-01-01 ENCOUNTER — APPOINTMENT (OUTPATIENT)
Dept: LAB | Facility: HOSPITAL | Age: 58
End: 2018-01-01
Attending: NURSE PRACTITIONER
Payer: COMMERCIAL

## 2018-01-01 ENCOUNTER — LAB ENCOUNTER (OUTPATIENT)
Dept: LAB | Facility: HOSPITAL | Age: 58
End: 2018-01-01
Attending: OBSTETRICS & GYNECOLOGY
Payer: COMMERCIAL

## 2018-01-01 ENCOUNTER — APPOINTMENT (OUTPATIENT)
Dept: GENERAL RADIOLOGY | Facility: HOSPITAL | Age: 58
End: 2018-01-01
Attending: UROLOGY
Payer: COMMERCIAL

## 2018-01-01 ENCOUNTER — DIETICIAN VISIT (OUTPATIENT)
Dept: HEMATOLOGY/ONCOLOGY | Facility: HOSPITAL | Age: 58
End: 2018-01-01

## 2018-01-01 VITALS
OXYGEN SATURATION: 100 % | BODY MASS INDEX: 23 KG/M2 | DIASTOLIC BLOOD PRESSURE: 88 MMHG | RESPIRATION RATE: 16 BRPM | HEART RATE: 82 BPM | TEMPERATURE: 97 F | SYSTOLIC BLOOD PRESSURE: 124 MMHG | WEIGHT: 161 LBS

## 2018-01-01 VITALS
HEART RATE: 60 BPM | RESPIRATION RATE: 18 BRPM | SYSTOLIC BLOOD PRESSURE: 128 MMHG | OXYGEN SATURATION: 98 % | TEMPERATURE: 98 F | DIASTOLIC BLOOD PRESSURE: 78 MMHG

## 2018-01-01 VITALS
OXYGEN SATURATION: 99 % | HEART RATE: 65 BPM | SYSTOLIC BLOOD PRESSURE: 131 MMHG | DIASTOLIC BLOOD PRESSURE: 74 MMHG | RESPIRATION RATE: 16 BRPM | TEMPERATURE: 98 F

## 2018-01-01 VITALS
OXYGEN SATURATION: 98 % | SYSTOLIC BLOOD PRESSURE: 137 MMHG | HEIGHT: 70 IN | DIASTOLIC BLOOD PRESSURE: 90 MMHG | HEART RATE: 65 BPM | WEIGHT: 164 LBS | BODY MASS INDEX: 23.48 KG/M2 | RESPIRATION RATE: 15 BRPM | TEMPERATURE: 98 F

## 2018-01-01 VITALS
SYSTOLIC BLOOD PRESSURE: 132 MMHG | RESPIRATION RATE: 16 BRPM | TEMPERATURE: 98 F | WEIGHT: 165.13 LBS | HEIGHT: 70 IN | BODY MASS INDEX: 23.64 KG/M2 | OXYGEN SATURATION: 100 % | DIASTOLIC BLOOD PRESSURE: 83 MMHG | HEART RATE: 61 BPM

## 2018-01-01 VITALS
BODY MASS INDEX: 24 KG/M2 | OXYGEN SATURATION: 100 % | WEIGHT: 167 LBS | RESPIRATION RATE: 18 BRPM | HEART RATE: 65 BPM | DIASTOLIC BLOOD PRESSURE: 79 MMHG | TEMPERATURE: 97 F | SYSTOLIC BLOOD PRESSURE: 119 MMHG

## 2018-01-01 VITALS
TEMPERATURE: 97 F | SYSTOLIC BLOOD PRESSURE: 151 MMHG | WEIGHT: 159.5 LBS | BODY MASS INDEX: 23 KG/M2 | DIASTOLIC BLOOD PRESSURE: 84 MMHG | RESPIRATION RATE: 16 BRPM | HEART RATE: 72 BPM

## 2018-01-01 VITALS
SYSTOLIC BLOOD PRESSURE: 121 MMHG | WEIGHT: 165 LBS | RESPIRATION RATE: 18 BRPM | DIASTOLIC BLOOD PRESSURE: 71 MMHG | OXYGEN SATURATION: 96 % | HEIGHT: 70 IN | TEMPERATURE: 98 F | BODY MASS INDEX: 23.62 KG/M2 | HEART RATE: 62 BPM

## 2018-01-01 VITALS
BODY MASS INDEX: 24.34 KG/M2 | HEIGHT: 70 IN | SYSTOLIC BLOOD PRESSURE: 144 MMHG | DIASTOLIC BLOOD PRESSURE: 82 MMHG | HEART RATE: 59 BPM | WEIGHT: 170 LBS | RESPIRATION RATE: 18 BRPM | TEMPERATURE: 98 F | OXYGEN SATURATION: 98 %

## 2018-01-01 VITALS
HEART RATE: 86 BPM | DIASTOLIC BLOOD PRESSURE: 80 MMHG | OXYGEN SATURATION: 98 % | RESPIRATION RATE: 16 BRPM | WEIGHT: 165.5 LBS | BODY MASS INDEX: 24 KG/M2 | SYSTOLIC BLOOD PRESSURE: 121 MMHG | TEMPERATURE: 98 F

## 2018-01-01 VITALS
SYSTOLIC BLOOD PRESSURE: 115 MMHG | RESPIRATION RATE: 16 BRPM | WEIGHT: 164 LBS | TEMPERATURE: 98 F | OXYGEN SATURATION: 100 % | HEART RATE: 78 BPM | BODY MASS INDEX: 24 KG/M2 | DIASTOLIC BLOOD PRESSURE: 75 MMHG

## 2018-01-01 VITALS
OXYGEN SATURATION: 99 % | WEIGHT: 165 LBS | BODY MASS INDEX: 23.62 KG/M2 | HEART RATE: 68 BPM | TEMPERATURE: 98 F | SYSTOLIC BLOOD PRESSURE: 130 MMHG | HEIGHT: 70 IN | RESPIRATION RATE: 16 BRPM | DIASTOLIC BLOOD PRESSURE: 81 MMHG

## 2018-01-01 VITALS
OXYGEN SATURATION: 100 % | TEMPERATURE: 97 F | RESPIRATION RATE: 18 BRPM | WEIGHT: 164.5 LBS | BODY MASS INDEX: 24 KG/M2 | DIASTOLIC BLOOD PRESSURE: 85 MMHG | HEART RATE: 66 BPM | SYSTOLIC BLOOD PRESSURE: 130 MMHG

## 2018-01-01 VITALS — WEIGHT: 166.5 LBS | BODY MASS INDEX: 24 KG/M2

## 2018-01-01 VITALS
OXYGEN SATURATION: 100 % | DIASTOLIC BLOOD PRESSURE: 76 MMHG | RESPIRATION RATE: 18 BRPM | TEMPERATURE: 99 F | SYSTOLIC BLOOD PRESSURE: 122 MMHG | HEART RATE: 70 BPM

## 2018-01-01 VITALS
OXYGEN SATURATION: 100 % | DIASTOLIC BLOOD PRESSURE: 79 MMHG | HEART RATE: 77 BPM | SYSTOLIC BLOOD PRESSURE: 122 MMHG | TEMPERATURE: 98 F | RESPIRATION RATE: 18 BRPM

## 2018-01-01 VITALS
SYSTOLIC BLOOD PRESSURE: 126 MMHG | TEMPERATURE: 98 F | BODY MASS INDEX: 24.34 KG/M2 | OXYGEN SATURATION: 95 % | HEIGHT: 70 IN | RESPIRATION RATE: 18 BRPM | WEIGHT: 170 LBS | DIASTOLIC BLOOD PRESSURE: 80 MMHG | HEART RATE: 59 BPM

## 2018-01-01 VITALS
TEMPERATURE: 97 F | OXYGEN SATURATION: 100 % | DIASTOLIC BLOOD PRESSURE: 83 MMHG | SYSTOLIC BLOOD PRESSURE: 138 MMHG | RESPIRATION RATE: 16 BRPM | BODY MASS INDEX: 24 KG/M2 | WEIGHT: 168 LBS | HEART RATE: 64 BPM

## 2018-01-01 VITALS
OXYGEN SATURATION: 95 % | SYSTOLIC BLOOD PRESSURE: 137 MMHG | TEMPERATURE: 99 F | RESPIRATION RATE: 20 BRPM | DIASTOLIC BLOOD PRESSURE: 87 MMHG | HEART RATE: 67 BPM

## 2018-01-01 VITALS
DIASTOLIC BLOOD PRESSURE: 81 MMHG | OXYGEN SATURATION: 100 % | TEMPERATURE: 97 F | SYSTOLIC BLOOD PRESSURE: 122 MMHG | RESPIRATION RATE: 16 BRPM | HEART RATE: 84 BPM

## 2018-01-01 VITALS
RESPIRATION RATE: 18 BRPM | OXYGEN SATURATION: 100 % | DIASTOLIC BLOOD PRESSURE: 91 MMHG | SYSTOLIC BLOOD PRESSURE: 155 MMHG | HEART RATE: 71 BPM | TEMPERATURE: 97 F

## 2018-01-01 VITALS
DIASTOLIC BLOOD PRESSURE: 83 MMHG | HEART RATE: 59 BPM | RESPIRATION RATE: 13 BRPM | OXYGEN SATURATION: 97 % | TEMPERATURE: 98 F | SYSTOLIC BLOOD PRESSURE: 137 MMHG

## 2018-01-01 VITALS
DIASTOLIC BLOOD PRESSURE: 73 MMHG | RESPIRATION RATE: 18 BRPM | SYSTOLIC BLOOD PRESSURE: 126 MMHG | TEMPERATURE: 99 F | WEIGHT: 159 LBS | BODY MASS INDEX: 23 KG/M2 | HEART RATE: 75 BPM | OXYGEN SATURATION: 97 %

## 2018-01-01 VITALS — BODY MASS INDEX: 24.62 KG/M2 | HEIGHT: 70 IN | WEIGHT: 172 LBS

## 2018-01-01 VITALS
TEMPERATURE: 97 F | SYSTOLIC BLOOD PRESSURE: 150 MMHG | OXYGEN SATURATION: 100 % | RESPIRATION RATE: 16 BRPM | HEART RATE: 56 BPM | WEIGHT: 169 LBS | BODY MASS INDEX: 24 KG/M2 | DIASTOLIC BLOOD PRESSURE: 93 MMHG

## 2018-01-01 VITALS
HEART RATE: 80 BPM | TEMPERATURE: 98 F | DIASTOLIC BLOOD PRESSURE: 78 MMHG | SYSTOLIC BLOOD PRESSURE: 132 MMHG | RESPIRATION RATE: 20 BRPM

## 2018-01-01 VITALS
TEMPERATURE: 98 F | HEART RATE: 76 BPM | SYSTOLIC BLOOD PRESSURE: 117 MMHG | RESPIRATION RATE: 16 BRPM | OXYGEN SATURATION: 100 % | DIASTOLIC BLOOD PRESSURE: 74 MMHG

## 2018-01-01 DIAGNOSIS — R11.2 INTRACTABLE VOMITING WITH NAUSEA, UNSPECIFIED VOMITING TYPE: Primary | ICD-10-CM

## 2018-01-01 DIAGNOSIS — C77.2 SECONDARY MALIGNANT NEOPLASM OF RETROPERITONEAL LYMPH NODES (HCC): ICD-10-CM

## 2018-01-01 DIAGNOSIS — C20 RECTAL CANCER (HCC): ICD-10-CM

## 2018-01-01 DIAGNOSIS — F41.9 ANXIETY: ICD-10-CM

## 2018-01-01 DIAGNOSIS — C20 RECTAL CANCER (HCC): Primary | ICD-10-CM

## 2018-01-01 DIAGNOSIS — C77.2 SECONDARY MALIGNANT NEOPLASM OF RETROPERITONEAL LYMPH NODES (HCC): Primary | ICD-10-CM

## 2018-01-01 DIAGNOSIS — G43.019 INTRACTABLE MIGRAINE WITHOUT AURA AND WITHOUT STATUS MIGRAINOSUS: ICD-10-CM

## 2018-01-01 DIAGNOSIS — R31.9 URINARY TRACT INFECTION WITH HEMATURIA, SITE UNSPECIFIED: ICD-10-CM

## 2018-01-01 DIAGNOSIS — F32.9 REACTIVE DEPRESSION: ICD-10-CM

## 2018-01-01 DIAGNOSIS — C78.7 SECONDARY LIVER CANCER (HCC): Primary | ICD-10-CM

## 2018-01-01 DIAGNOSIS — C78.7 SECONDARY LIVER CANCER (HCC): ICD-10-CM

## 2018-01-01 DIAGNOSIS — N13.30 HYDRONEPHROSIS, UNSPECIFIED HYDRONEPHROSIS TYPE: ICD-10-CM

## 2018-01-01 DIAGNOSIS — C20 RECTAL CANCER METASTASIZED TO LIVER (HCC): ICD-10-CM

## 2018-01-01 DIAGNOSIS — C78.7 RECTAL CANCER METASTASIZED TO LIVER (HCC): ICD-10-CM

## 2018-01-01 DIAGNOSIS — R10.9 INTRACTABLE ABDOMINAL PAIN: Primary | ICD-10-CM

## 2018-01-01 DIAGNOSIS — Z51.11 ENCOUNTER FOR CHEMOTHERAPY MANAGEMENT: ICD-10-CM

## 2018-01-01 DIAGNOSIS — N13.5 URETERAL OBSTRUCTION: ICD-10-CM

## 2018-01-01 DIAGNOSIS — T83.84XA PAIN DUE TO URETERAL STENT, INITIAL ENCOUNTER (HCC): ICD-10-CM

## 2018-01-01 DIAGNOSIS — N13.30 HYDRONEPHROSIS, LEFT: ICD-10-CM

## 2018-01-01 DIAGNOSIS — N39.0 URINARY TRACT INFECTION WITH HEMATURIA, SITE UNSPECIFIED: ICD-10-CM

## 2018-01-01 DIAGNOSIS — K52.1 CHEMOTHERAPY-INDUCED ENTERITIS: ICD-10-CM

## 2018-01-01 DIAGNOSIS — R10.9 FLANK PAIN: Primary | ICD-10-CM

## 2018-01-01 DIAGNOSIS — G89.3 CHRONIC PAIN DUE TO MALIGNANT NEOPLASTIC DISEASE: ICD-10-CM

## 2018-01-01 DIAGNOSIS — K62.89 MASS OF PERIRECTAL SOFT TISSUE: ICD-10-CM

## 2018-01-01 DIAGNOSIS — K62.89 MASS OF PERIRECTAL SOFT TISSUE: Primary | ICD-10-CM

## 2018-01-01 DIAGNOSIS — C20 RECTAL CANCER METASTASIZED TO LIVER (HCC): Primary | ICD-10-CM

## 2018-01-01 DIAGNOSIS — C78.7 RECTAL CANCER METASTASIZED TO LIVER (HCC): Primary | ICD-10-CM

## 2018-01-01 DIAGNOSIS — G43.019 INTRACTABLE MIGRAINE WITHOUT AURA AND WITHOUT STATUS MIGRAINOSUS: Primary | ICD-10-CM

## 2018-01-01 DIAGNOSIS — R11.2 INTRACTABLE VOMITING WITH NAUSEA, UNSPECIFIED VOMITING TYPE: ICD-10-CM

## 2018-01-01 DIAGNOSIS — T45.1X5A ANEMIA DUE TO ANTINEOPLASTIC CHEMOTHERAPY: ICD-10-CM

## 2018-01-01 DIAGNOSIS — T45.1X5A CHEMOTHERAPY-INDUCED ENTERITIS: ICD-10-CM

## 2018-01-01 DIAGNOSIS — N13.5 OBSTRUCTION OF LEFT URETER: ICD-10-CM

## 2018-01-01 DIAGNOSIS — N17.9 AKI (ACUTE KIDNEY INJURY) (HCC): ICD-10-CM

## 2018-01-01 DIAGNOSIS — K12.1 STOMATITIS, ULCERATIVE: Primary | ICD-10-CM

## 2018-01-01 DIAGNOSIS — R19.00 RETROPERITONEAL MASS: Primary | ICD-10-CM

## 2018-01-01 DIAGNOSIS — R19.7 DIARRHEA: ICD-10-CM

## 2018-01-01 DIAGNOSIS — D64.81 ANEMIA DUE TO ANTINEOPLASTIC CHEMOTHERAPY: ICD-10-CM

## 2018-01-01 DIAGNOSIS — Z71.9 ENCOUNTER FOR HEALTH EDUCATION: ICD-10-CM

## 2018-01-01 DIAGNOSIS — Z96.0 STATUS POST PLACEMENT OF URETERAL STENT: ICD-10-CM

## 2018-01-01 DIAGNOSIS — R31.0 HEMATURIA, GROSS: ICD-10-CM

## 2018-01-01 DIAGNOSIS — G89.3 CANCER ASSOCIATED PAIN: ICD-10-CM

## 2018-01-01 LAB
ALBUMIN SERPL-MCNC: 2.9 G/DL (ref 3.5–4.8)
ALBUMIN SERPL-MCNC: 2.9 G/DL (ref 3.5–4.8)
ALBUMIN SERPL-MCNC: 3.1 G/DL (ref 3.5–4.8)
ALBUMIN SERPL-MCNC: 3.5 G/DL (ref 3.5–4.8)
ALBUMIN/GLOB SERPL: 0.6 {RATIO} (ref 1–2)
ALBUMIN/GLOB SERPL: 0.8 {RATIO} (ref 1–2)
ALP LIVER SERPL-CCNC: 101 U/L (ref 45–117)
ALP LIVER SERPL-CCNC: 106 U/L (ref 45–117)
ALP LIVER SERPL-CCNC: 119 U/L (ref 45–117)
ALP LIVER SERPL-CCNC: 138 U/L (ref 45–117)
ALT SERPL-CCNC: 18 U/L (ref 17–63)
ALT SERPL-CCNC: 20 U/L (ref 17–63)
ALT SERPL-CCNC: 21 U/L (ref 17–63)
ALT SERPL-CCNC: 28 U/L (ref 17–63)
ANION GAP SERPL CALC-SCNC: 5 MMOL/L (ref 0–18)
ANION GAP SERPL CALC-SCNC: 6 MMOL/L (ref 0–18)
ANION GAP SERPL CALC-SCNC: 7 MMOL/L (ref 0–18)
ANION GAP SERPL CALC-SCNC: 8 MMOL/L (ref 0–18)
AST SERPL-CCNC: 19 U/L (ref 15–41)
AST SERPL-CCNC: 24 U/L (ref 15–41)
AST SERPL-CCNC: 25 U/L (ref 15–41)
AST SERPL-CCNC: 31 U/L (ref 15–41)
BAND %: 25 %
BASOPHIL % MANUAL: 0 %
BASOPHIL ABSOLUTE MANUAL: 0 X10(3) UL (ref 0–0.1)
BASOPHILS # BLD AUTO: 0.02 X10(3) UL (ref 0–0.1)
BASOPHILS # BLD AUTO: 0.03 X10(3) UL (ref 0–0.1)
BASOPHILS # BLD AUTO: 0.06 X10(3) UL (ref 0–0.1)
BASOPHILS NFR BLD AUTO: 0.2 %
BASOPHILS NFR BLD AUTO: 0.2 %
BASOPHILS NFR BLD AUTO: 0.3 %
BASOPHILS NFR BLD AUTO: 0.7 %
BASOPHILS NFR BLD AUTO: 1.3 %
BILIRUB SERPL-MCNC: 0.1 MG/DL (ref 0.1–2)
BILIRUB SERPL-MCNC: 0.2 MG/DL (ref 0.1–2)
BILIRUB SERPL-MCNC: 0.4 MG/DL (ref 0.1–2)
BILIRUB SERPL-MCNC: 0.5 MG/DL (ref 0.1–2)
BILIRUB UR QL STRIP.AUTO: NEGATIVE
BRAF CRC: DETECTED
BUN BLD-MCNC: 13 MG/DL (ref 8–20)
BUN BLD-MCNC: 13 MG/DL (ref 8–20)
BUN BLD-MCNC: 15 MG/DL (ref 8–20)
BUN BLD-MCNC: 15 MG/DL (ref 8–20)
BUN BLD-MCNC: 16 MG/DL (ref 8–20)
BUN BLD-MCNC: 17 MG/DL (ref 8–20)
BUN/CREAT SERPL: 10.2 (ref 10–20)
BUN/CREAT SERPL: 10.8 (ref 10–20)
BUN/CREAT SERPL: 11.3 (ref 10–20)
BUN/CREAT SERPL: 12.1 (ref 10–20)
BUN/CREAT SERPL: 13 (ref 10–20)
BUN/CREAT SERPL: 16.8 (ref 10–20)
CALCIUM BLD-MCNC: 8.7 MG/DL (ref 8.3–10.3)
CALCIUM BLD-MCNC: 8.8 MG/DL (ref 8.3–10.3)
CALCIUM BLD-MCNC: 9.3 MG/DL (ref 8.3–10.3)
CALCIUM BLD-MCNC: 9.8 MG/DL (ref 8.3–10.3)
CEA SERPL-MCNC: 13.6 NG/ML (ref 0.5–5)
CEA SERPL-MCNC: 21 NG/ML (ref 0.5–5)
CEA SERPL-MCNC: 21.7 NG/ML (ref 0.5–5)
CHLORIDE SERPL-SCNC: 100 MMOL/L (ref 101–111)
CHLORIDE SERPL-SCNC: 101 MMOL/L (ref 101–111)
CHLORIDE SERPL-SCNC: 103 MMOL/L (ref 101–111)
CHLORIDE SERPL-SCNC: 103 MMOL/L (ref 101–111)
CHLORIDE SERPL-SCNC: 105 MMOL/L (ref 101–111)
CHLORIDE SERPL-SCNC: 98 MMOL/L (ref 101–111)
CLARITY UR REFRACT.AUTO: CLEAR
CO2 SERPL-SCNC: 28 MMOL/L (ref 22–32)
CO2 SERPL-SCNC: 29 MMOL/L (ref 22–32)
CO2 SERPL-SCNC: 31 MMOL/L (ref 22–32)
COLOR UR AUTO: YELLOW
CREAT BLD-MCNC: 0.95 MG/DL (ref 0.7–1.3)
CREAT BLD-MCNC: 1 MG/DL (ref 0.7–1.3)
CREAT BLD-MCNC: 1.24 MG/DL (ref 0.7–1.3)
CREAT BLD-MCNC: 1.28 MG/DL (ref 0.7–1.3)
CREAT BLD-MCNC: 1.39 MG/DL (ref 0.7–1.3)
CREAT BLD-MCNC: 1.5 MG/DL (ref 0.7–1.3)
EOSINOPHIL # BLD AUTO: 0.01 X10(3) UL (ref 0–0.3)
EOSINOPHIL # BLD AUTO: 0.02 X10(3) UL (ref 0–0.3)
EOSINOPHIL # BLD AUTO: 0.15 X10(3) UL (ref 0–0.3)
EOSINOPHIL # BLD AUTO: 0.23 X10(3) UL (ref 0–0.3)
EOSINOPHIL # BLD AUTO: 0.27 X10(3) UL (ref 0–0.3)
EOSINOPHIL % MANUAL: 3 %
EOSINOPHIL ABSOLUTE MANUAL: 0.18 X10(3) UL (ref 0–0.3)
EOSINOPHIL NFR BLD AUTO: 0.1 %
EOSINOPHIL NFR BLD AUTO: 0.3 %
EOSINOPHIL NFR BLD AUTO: 3.1 %
EOSINOPHIL NFR BLD AUTO: 3.5 %
EOSINOPHIL NFR BLD AUTO: 4.9 %
ERYTHROCYTE [DISTWIDTH] IN BLOOD BY AUTOMATED COUNT: 12.3 % (ref 11.5–16)
ERYTHROCYTE [DISTWIDTH] IN BLOOD BY AUTOMATED COUNT: 12.4 % (ref 11.5–16)
ERYTHROCYTE [DISTWIDTH] IN BLOOD BY AUTOMATED COUNT: 12.7 % (ref 11.5–16)
ERYTHROCYTE [DISTWIDTH] IN BLOOD BY AUTOMATED COUNT: 13.5 % (ref 11.5–16)
GLOBULIN PLAS-MCNC: 4.5 G/DL (ref 2.5–4)
GLOBULIN PLAS-MCNC: 4.6 G/DL (ref 2.5–4)
GLOBULIN PLAS-MCNC: 4.7 G/DL (ref 2.5–4)
GLOBULIN PLAS-MCNC: 5.4 G/DL (ref 2.5–4)
GLUCOSE BLD-MCNC: 102 MG/DL (ref 70–99)
GLUCOSE BLD-MCNC: 105 MG/DL (ref 70–99)
GLUCOSE BLD-MCNC: 105 MG/DL (ref 70–99)
GLUCOSE BLD-MCNC: 111 MG/DL (ref 70–99)
GLUCOSE BLD-MCNC: 113 MG/DL (ref 70–99)
GLUCOSE BLD-MCNC: 95 MG/DL (ref 70–99)
GLUCOSE UR STRIP.AUTO-MCNC: NEGATIVE MG/DL
HCT VFR BLD AUTO: 35.3 % (ref 37–53)
HCT VFR BLD AUTO: 36.8 % (ref 37–53)
HCT VFR BLD AUTO: 37.6 % (ref 37–53)
HCT VFR BLD AUTO: 38.7 % (ref 37–53)
HCT VFR BLD AUTO: 39.4 % (ref 37–53)
HCT VFR BLD AUTO: 44.1 % (ref 37–53)
HGB BLD-MCNC: 11.7 G/DL (ref 13–17)
HGB BLD-MCNC: 11.8 G/DL (ref 13–17)
HGB BLD-MCNC: 12 G/DL (ref 13–17)
HGB BLD-MCNC: 12.8 G/DL (ref 13–17)
HGB BLD-MCNC: 12.9 G/DL (ref 13–17)
HGB BLD-MCNC: 14.5 G/DL (ref 13–17)
IMMATURE GRANULOCYTE COUNT: 0.01 X10(3) UL (ref 0–1)
IMMATURE GRANULOCYTE COUNT: 0.02 X10(3) UL (ref 0–1)
IMMATURE GRANULOCYTE COUNT: 0.02 X10(3) UL (ref 0–1)
IMMATURE GRANULOCYTE COUNT: 0.04 X10(3) UL (ref 0–1)
IMMATURE GRANULOCYTE COUNT: 0.21 X10(3) UL (ref 0–1)
IMMATURE GRANULOCYTE RATIO %: 0.2 %
IMMATURE GRANULOCYTE RATIO %: 0.3 %
IMMATURE GRANULOCYTE RATIO %: 0.4 %
IMMATURE GRANULOCYTE RATIO %: 0.4 %
IMMATURE GRANULOCYTE RATIO %: 2.4 %
INR BLD: 0.99 (ref 0.9–1.1)
INR BLD: 1.06 (ref 0.9–1.1)
INR: 1.1 (ref 0.8–1.3)
KETONES UR STRIP.AUTO-MCNC: NEGATIVE MG/DL
KRAS CRC: NOT DETECTED
LDH: 217 U/L (ref 84–249)
LEUKOCYTE ESTERASE UR QL STRIP.AUTO: NEGATIVE
LYMPHOCYTE % MANUAL: 14 %
LYMPHOCYTE ABSOLUTE MANUAL: 0.85 X10(3) UL (ref 0.9–4)
LYMPHOCYTES # BLD AUTO: 0.82 X10(3) UL (ref 0.9–4)
LYMPHOCYTES # BLD AUTO: 0.85 X10(3) UL (ref 0.9–4)
LYMPHOCYTES # BLD AUTO: 0.86 X10(3) UL (ref 0.9–4)
LYMPHOCYTES # BLD AUTO: 0.94 X10(3) UL (ref 0.9–4)
LYMPHOCYTES # BLD AUTO: 1 X10(3) UL (ref 0.9–4)
LYMPHOCYTES NFR BLD AUTO: 10.1 %
LYMPHOCYTES NFR BLD AUTO: 14.3 %
LYMPHOCYTES NFR BLD AUTO: 17.4 %
LYMPHOCYTES NFR BLD AUTO: 20.2 %
LYMPHOCYTES NFR BLD AUTO: 9.8 %
M PROTEIN MFR SERPL ELPH: 7.4 G/DL (ref 6.1–8.3)
M PROTEIN MFR SERPL ELPH: 7.6 G/DL (ref 6.1–8.3)
M PROTEIN MFR SERPL ELPH: 8.1 G/DL (ref 6.1–8.3)
M PROTEIN MFR SERPL ELPH: 8.5 G/DL (ref 6.1–8.3)
MCH RBC QN AUTO: 28.5 PG (ref 27–33.2)
MCH RBC QN AUTO: 28.5 PG (ref 27–33.2)
MCH RBC QN AUTO: 28.6 PG (ref 27–33.2)
MCH RBC QN AUTO: 29.9 PG (ref 27–33.2)
MCH RBC QN AUTO: 30 PG (ref 27–33.2)
MCH RBC QN AUTO: 30.3 PG (ref 27–33.2)
MCHC RBC AUTO-ENTMCNC: 31.9 G/DL (ref 31–37)
MCHC RBC AUTO-ENTMCNC: 32.1 G/DL (ref 31–37)
MCHC RBC AUTO-ENTMCNC: 32.5 G/DL (ref 31–37)
MCHC RBC AUTO-ENTMCNC: 32.9 G/DL (ref 31–37)
MCHC RBC AUTO-ENTMCNC: 33.1 G/DL (ref 31–37)
MCHC RBC AUTO-ENTMCNC: 33.3 G/DL (ref 31–37)
MCV RBC AUTO: 87.8 FL (ref 80–99)
MCV RBC AUTO: 88.9 FL (ref 80–99)
MCV RBC AUTO: 89.5 FL (ref 80–99)
MCV RBC AUTO: 89.8 FL (ref 80–99)
MCV RBC AUTO: 90.5 FL (ref 80–99)
MCV RBC AUTO: 92.1 FL (ref 80–99)
MONOCYTE % MANUAL: 15 %
MONOCYTE ABSOLUTE MANUAL: 0.92 X10(3) UL (ref 0.1–1)
MONOCYTES # BLD AUTO: 0.57 X10(3) UL (ref 0.1–1)
MONOCYTES # BLD AUTO: 0.7 X10(3) UL (ref 0.1–1)
MONOCYTES # BLD AUTO: 0.78 X10(3) UL (ref 0.1–1)
MONOCYTES # BLD AUTO: 0.97 X10(3) UL (ref 0.1–1)
MONOCYTES # BLD AUTO: 1.33 X10(3) UL (ref 0.1–1)
MONOCYTES NFR BLD AUTO: 13.4 %
MONOCYTES NFR BLD AUTO: 13.5 %
MONOCYTES NFR BLD AUTO: 14.8 %
MONOCYTES NFR BLD AUTO: 14.9 %
MONOCYTES NFR BLD AUTO: 9 %
MORPHOLOGY: NORMAL
NEUTROPHIL ABS PRELIM: 2.63 X10 (3) UL (ref 1.3–6.7)
NEUTROPHIL ABS PRELIM: 2.87 X10 (3) UL (ref 1.3–6.7)
NEUTROPHIL ABS PRELIM: 4.09 X10 (3) UL (ref 1.3–6.7)
NEUTROPHIL ABS PRELIM: 4.6 X10 (3) UL (ref 1.3–6.7)
NEUTROPHIL ABS PRELIM: 6.58 X10 (3) UL (ref 1.3–6.7)
NEUTROPHIL ABS PRELIM: 7.47 X10 (3) UL (ref 1.3–6.7)
NEUTROPHIL ABSOLUTE MANUAL: 4.15 X10(3) UL (ref 1.3–6.7)
NEUTROPHILS # BLD AUTO: 2.63 X10(3) UL (ref 1.3–6.7)
NEUTROPHILS # BLD AUTO: 2.87 X10(3) UL (ref 1.3–6.7)
NEUTROPHILS # BLD AUTO: 4.6 X10(3) UL (ref 1.3–6.7)
NEUTROPHILS # BLD AUTO: 6.58 X10(3) UL (ref 1.3–6.7)
NEUTROPHILS # BLD AUTO: 7.47 X10(3) UL (ref 1.3–6.7)
NEUTROPHILS % MANUAL: 43 %
NEUTROPHILS NFR BLD AUTO: 61.1 %
NEUTROPHILS NFR BLD AUTO: 62 %
NEUTROPHILS NFR BLD AUTO: 70 %
NEUTROPHILS NFR BLD AUTO: 75.5 %
NEUTROPHILS NFR BLD AUTO: 75.7 %
NITRITE UR QL STRIP.AUTO: NEGATIVE
NRAS CRC: NOT DETECTED
OSMOLALITY SERPL CALC.SUM OF ELEC: 278 MOSM/KG (ref 275–295)
OSMOLALITY SERPL CALC.SUM OF ELEC: 284 MOSM/KG (ref 275–295)
OSMOLALITY SERPL CALC.SUM OF ELEC: 284 MOSM/KG (ref 275–295)
OSMOLALITY SERPL CALC.SUM OF ELEC: 286 MOSM/KG (ref 275–295)
OSMOLALITY SERPL CALC.SUM OF ELEC: 288 MOSM/KG (ref 275–295)
OSMOLALITY SERPL CALC.SUM OF ELEC: 292 MOSM/KG (ref 275–295)
PH UR STRIP.AUTO: 5 [PH] (ref 4.5–8)
PIK3CA CRC: NOT DETECTED
PLATELET # BLD AUTO: 145 10(3)UL (ref 150–450)
PLATELET # BLD AUTO: 151 10(3)UL (ref 150–450)
PLATELET # BLD AUTO: 156 10(3)UL (ref 150–450)
PLATELET # BLD AUTO: 168 10(3)UL (ref 150–450)
PLATELET # BLD AUTO: 178 10(3)UL (ref 150–450)
PLATELET # BLD AUTO: 241 10(3)UL (ref 150–450)
PLATELET MORPHOLOGY: NORMAL
POTASSIUM SERPL-SCNC: 3.7 MMOL/L (ref 3.6–5.1)
POTASSIUM SERPL-SCNC: 3.9 MMOL/L (ref 3.6–5.1)
POTASSIUM SERPL-SCNC: 4 MMOL/L (ref 3.6–5.1)
POTASSIUM SERPL-SCNC: 4.1 MMOL/L (ref 3.6–5.1)
POTASSIUM SERPL-SCNC: 4.4 MMOL/L (ref 3.6–5.1)
POTASSIUM SERPL-SCNC: 5.1 MMOL/L (ref 3.6–5.1)
PROT UR STRIP.AUTO-MCNC: NEGATIVE MG/DL
PSA SERPL DL<=0.01 NG/ML-MCNC: 13.5 SECONDS (ref 12.4–14.7)
PSA SERPL DL<=0.01 NG/ML-MCNC: 14.2 SECONDS (ref 12.4–14.7)
RBC # BLD AUTO: 3.9 X10(6)UL (ref 4.3–5.7)
RBC # BLD AUTO: 4.14 X10(6)UL (ref 4.3–5.7)
RBC # BLD AUTO: 4.2 X10(6)UL (ref 4.3–5.7)
RBC # BLD AUTO: 4.31 X10(6)UL (ref 4.3–5.7)
RBC # BLD AUTO: 4.49 X10(6)UL (ref 4.3–5.7)
RBC # BLD AUTO: 4.79 X10(6)UL (ref 4.3–5.7)
RED CELL DISTRIBUTION WIDTH-SD: 39.8 FL (ref 35.1–46.3)
RED CELL DISTRIBUTION WIDTH-SD: 40 FL (ref 35.1–46.3)
RED CELL DISTRIBUTION WIDTH-SD: 40.6 FL (ref 35.1–46.3)
RED CELL DISTRIBUTION WIDTH-SD: 40.7 FL (ref 35.1–46.3)
RED CELL DISTRIBUTION WIDTH-SD: 42.2 FL (ref 35.1–46.3)
RED CELL DISTRIBUTION WIDTH-SD: 43.5 FL (ref 35.1–46.3)
SODIUM SERPL-SCNC: 133 MMOL/L (ref 136–144)
SODIUM SERPL-SCNC: 136 MMOL/L (ref 136–144)
SODIUM SERPL-SCNC: 136 MMOL/L (ref 136–144)
SODIUM SERPL-SCNC: 137 MMOL/L (ref 136–144)
SODIUM SERPL-SCNC: 139 MMOL/L (ref 136–144)
SODIUM SERPL-SCNC: 141 MMOL/L (ref 136–144)
SP GR UR STRIP.AUTO: 1.05 (ref 1–1.03)
TOTAL CELLS COUNTED: 100
UROBILINOGEN UR STRIP.AUTO-MCNC: <2 MG/DL
WBC # BLD AUTO: 4.3 X10(3) UL (ref 4–13)
WBC # BLD AUTO: 4.7 X10(3) UL (ref 4–13)
WBC # BLD AUTO: 6.1 X10(3) UL (ref 4–13)
WBC # BLD AUTO: 6.6 X10(3) UL (ref 4–13)
WBC # BLD AUTO: 8.7 X10(3) UL (ref 4–13)
WBC # BLD AUTO: 9.9 X10(3) UL (ref 4–13)

## 2018-01-01 PROCEDURE — 85025 COMPLETE CBC W/AUTO DIFF WBC: CPT

## 2018-01-01 PROCEDURE — 74177 CT ABD & PELVIS W/CONTRAST: CPT | Performed by: INTERNAL MEDICINE

## 2018-01-01 PROCEDURE — 96417 CHEMO IV INFUS EACH ADDL SEQ: CPT

## 2018-01-01 PROCEDURE — 96374 THER/PROPH/DIAG INJ IV PUSH: CPT

## 2018-01-01 PROCEDURE — 96523 IRRIG DRUG DELIVERY DEVICE: CPT

## 2018-01-01 PROCEDURE — 80053 COMPREHEN METABOLIC PANEL: CPT

## 2018-01-01 PROCEDURE — 96372 THER/PROPH/DIAG INJ SC/IM: CPT

## 2018-01-01 PROCEDURE — 99214 OFFICE O/P EST MOD 30 MIN: CPT | Performed by: CLINICAL NURSE SPECIALIST

## 2018-01-01 PROCEDURE — 96413 CHEMO IV INFUSION 1 HR: CPT

## 2018-01-01 PROCEDURE — 82378 CARCINOEMBRYONIC ANTIGEN: CPT

## 2018-01-01 PROCEDURE — 80048 BASIC METABOLIC PNL TOTAL CA: CPT

## 2018-01-01 PROCEDURE — 96375 TX/PRO/DX INJ NEW DRUG ADDON: CPT

## 2018-01-01 PROCEDURE — 36561 INSERT TUNNELED CV CATH: CPT

## 2018-01-01 PROCEDURE — 96416 CHEMO PROLONG INFUSE W/PUMP: CPT

## 2018-01-01 PROCEDURE — 99254 IP/OBS CNSLTJ NEW/EST MOD 60: CPT | Performed by: INTERNAL MEDICINE

## 2018-01-01 PROCEDURE — 88305 TISSUE EXAM BY PATHOLOGIST: CPT | Performed by: NURSE PRACTITIONER

## 2018-01-01 PROCEDURE — 99285 EMERGENCY DEPT VISIT HI MDM: CPT | Performed by: EMERGENCY MEDICINE

## 2018-01-01 PROCEDURE — 96361 HYDRATE IV INFUSION ADD-ON: CPT

## 2018-01-01 PROCEDURE — 96367 TX/PROPH/DG ADDL SEQ IV INF: CPT

## 2018-01-01 PROCEDURE — 99215 OFFICE O/P EST HI 40 MIN: CPT | Performed by: CLINICAL NURSE SPECIALIST

## 2018-01-01 PROCEDURE — 99222 1ST HOSP IP/OBS MODERATE 55: CPT | Performed by: STUDENT IN AN ORGANIZED HEALTH CARE EDUCATION/TRAINING PROGRAM

## 2018-01-01 PROCEDURE — 99233 SBSQ HOSP IP/OBS HIGH 50: CPT | Performed by: HOSPITALIST

## 2018-01-01 PROCEDURE — 87086 URINE CULTURE/COLONY COUNT: CPT | Performed by: EMERGENCY MEDICINE

## 2018-01-01 PROCEDURE — 77012 CT SCAN FOR NEEDLE BIOPSY: CPT | Performed by: NURSE PRACTITIONER

## 2018-01-01 PROCEDURE — 99214 OFFICE O/P EST MOD 30 MIN: CPT | Performed by: INTERNAL MEDICINE

## 2018-01-01 PROCEDURE — 71260 CT THORAX DX C+: CPT | Performed by: INTERNAL MEDICINE

## 2018-01-01 PROCEDURE — BT1F0ZZ FLUOROSCOPY OF LEFT KIDNEY, URETER AND BLADDER USING HIGH OSMOLAR CONTRAST: ICD-10-PCS | Performed by: UROLOGY

## 2018-01-01 PROCEDURE — 74176 CT ABD & PELVIS W/O CONTRAST: CPT | Performed by: EMERGENCY MEDICINE

## 2018-01-01 PROCEDURE — 96415 CHEMO IV INFUSION ADDL HR: CPT

## 2018-01-01 PROCEDURE — 02HV33Z INSERTION OF INFUSION DEVICE INTO SUPERIOR VENA CAVA, PERCUTANEOUS APPROACH: ICD-10-PCS | Performed by: RADIOLOGY

## 2018-01-01 PROCEDURE — 99239 HOSP IP/OBS DSCHRG MGMT >30: CPT | Performed by: HOSPITALIST

## 2018-01-01 PROCEDURE — 96368 THER/DIAG CONCURRENT INF: CPT

## 2018-01-01 PROCEDURE — 88173 CYTOPATH EVAL FNA REPORT: CPT | Performed by: NURSE PRACTITIONER

## 2018-01-01 PROCEDURE — 76937 US GUIDE VASCULAR ACCESS: CPT

## 2018-01-01 PROCEDURE — 99223 1ST HOSP IP/OBS HIGH 75: CPT | Performed by: INTERNAL MEDICINE

## 2018-01-01 PROCEDURE — 99153 MOD SED SAME PHYS/QHP EA: CPT

## 2018-01-01 PROCEDURE — 99239 HOSP IP/OBS DSCHRG MGMT >30: CPT | Performed by: INTERNAL MEDICINE

## 2018-01-01 PROCEDURE — 88342 IMHCHEM/IMCYTCHM 1ST ANTB: CPT | Performed by: NURSE PRACTITIONER

## 2018-01-01 PROCEDURE — 99232 SBSQ HOSP IP/OBS MODERATE 35: CPT | Performed by: INTERNAL MEDICINE

## 2018-01-01 PROCEDURE — 85610 PROTHROMBIN TIME: CPT | Performed by: RADIOLOGY

## 2018-01-01 PROCEDURE — 85007 BL SMEAR W/DIFF WBC COUNT: CPT

## 2018-01-01 PROCEDURE — 74420 UROGRAPHY RTRGR +-KUB: CPT | Performed by: UROLOGY

## 2018-01-01 PROCEDURE — 0TP98DZ REMOVAL OF INTRALUMINAL DEVICE FROM URETER, VIA NATURAL OR ARTIFICIAL OPENING ENDOSCOPIC: ICD-10-PCS | Performed by: UROLOGY

## 2018-01-01 PROCEDURE — 88341 IMHCHEM/IMCYTCHM EA ADD ANTB: CPT | Performed by: NURSE PRACTITIONER

## 2018-01-01 PROCEDURE — 0JH60WZ INSERTION OF TOTALLY IMPLANTABLE VASCULAR ACCESS DEVICE INTO CHEST SUBCUTANEOUS TISSUE AND FASCIA, OPEN APPROACH: ICD-10-PCS | Performed by: RADIOLOGY

## 2018-01-01 PROCEDURE — 0T778DZ DILATION OF LEFT URETER WITH INTRALUMINAL DEVICE, VIA NATURAL OR ARTIFICIAL OPENING ENDOSCOPIC: ICD-10-PCS | Performed by: UROLOGY

## 2018-01-01 PROCEDURE — 81015 MICROSCOPIC EXAM OF URINE: CPT

## 2018-01-01 PROCEDURE — 96376 TX/PRO/DX INJ SAME DRUG ADON: CPT

## 2018-01-01 PROCEDURE — 76775 US EXAM ABDO BACK WALL LIM: CPT | Performed by: PHYSICIAN ASSISTANT

## 2018-01-01 PROCEDURE — 96360 HYDRATION IV INFUSION INIT: CPT

## 2018-01-01 PROCEDURE — 49180 BIOPSY ABDOMINAL MASS: CPT | Performed by: NURSE PRACTITIONER

## 2018-01-01 PROCEDURE — 76000 FLUOROSCOPY <1 HR PHYS/QHP: CPT | Performed by: UROLOGY

## 2018-01-01 PROCEDURE — 81001 URINALYSIS AUTO W/SCOPE: CPT | Performed by: EMERGENCY MEDICINE

## 2018-01-01 PROCEDURE — 99284 EMERGENCY DEPT VISIT MOD MDM: CPT | Performed by: EMERGENCY MEDICINE

## 2018-01-01 PROCEDURE — 99152 MOD SED SAME PHYS/QHP 5/>YRS: CPT | Performed by: NURSE PRACTITIONER

## 2018-01-01 PROCEDURE — 99153 MOD SED SAME PHYS/QHP EA: CPT | Performed by: NURSE PRACTITIONER

## 2018-01-01 PROCEDURE — 99152 MOD SED SAME PHYS/QHP 5/>YRS: CPT

## 2018-01-01 PROCEDURE — BT1F1ZZ FLUOROSCOPY OF LEFT KIDNEY, URETER AND BLADDER USING LOW OSMOLAR CONTRAST: ICD-10-PCS | Performed by: UROLOGY

## 2018-01-01 PROCEDURE — 87086 URINE CULTURE/COLONY COUNT: CPT

## 2018-01-01 PROCEDURE — 96375 TX/PRO/DX INJ NEW DRUG ADDON: CPT | Performed by: EMERGENCY MEDICINE

## 2018-01-01 PROCEDURE — 96365 THER/PROPH/DIAG IV INF INIT: CPT | Performed by: EMERGENCY MEDICINE

## 2018-01-01 PROCEDURE — 96361 HYDRATE IV INFUSION ADD-ON: CPT | Performed by: EMERGENCY MEDICINE

## 2018-01-01 PROCEDURE — 74177 CT ABD & PELVIS W/CONTRAST: CPT | Performed by: PHYSICIAN ASSISTANT

## 2018-01-01 PROCEDURE — 85027 COMPLETE CBC AUTOMATED: CPT

## 2018-01-01 PROCEDURE — 82565 ASSAY OF CREATININE: CPT

## 2018-01-01 PROCEDURE — 88333 PATH CONSLTJ SURG CYTO XM 1: CPT | Performed by: NURSE PRACTITIONER

## 2018-01-01 PROCEDURE — 85025 COMPLETE CBC W/AUTO DIFF WBC: CPT | Performed by: EMERGENCY MEDICINE

## 2018-01-01 PROCEDURE — 80053 COMPREHEN METABOLIC PANEL: CPT | Performed by: EMERGENCY MEDICINE

## 2018-01-01 PROCEDURE — 96411 CHEMO IV PUSH ADDL DRUG: CPT

## 2018-01-01 PROCEDURE — 77001 FLUOROGUIDE FOR VEIN DEVICE: CPT

## 2018-01-01 PROCEDURE — 83735 ASSAY OF MAGNESIUM: CPT

## 2018-01-01 DEVICE — IMPLANTABLE DEVICE: Type: IMPLANTABLE DEVICE | Status: FUNCTIONAL

## 2018-01-01 DEVICE — STENT URET 6F 22CM INL OPT: Type: IMPLANTABLE DEVICE | Site: URETER | Status: FUNCTIONAL

## 2018-01-01 RX ORDER — LORAZEPAM 0.5 MG/1
TABLET ORAL EVERY 4 HOURS PRN
Status: CANCELLED | OUTPATIENT
Start: 2018-01-01

## 2018-01-01 RX ORDER — ONDANSETRON 2 MG/ML
8 INJECTION INTRAMUSCULAR; INTRAVENOUS EVERY 6 HOURS PRN
Status: CANCELLED | OUTPATIENT
Start: 2018-01-01

## 2018-01-01 RX ORDER — METOCLOPRAMIDE HYDROCHLORIDE 5 MG/ML
10 INJECTION INTRAMUSCULAR; INTRAVENOUS EVERY 6 HOURS PRN
Status: CANCELLED | OUTPATIENT
Start: 2018-01-01

## 2018-01-01 RX ORDER — SODIUM CHLORIDE 0.9 % (FLUSH) 0.9 %
10 SYRINGE (ML) INJECTION ONCE
Status: CANCELLED | OUTPATIENT
Start: 2018-01-01

## 2018-01-01 RX ORDER — ATROPINE SULFATE 0.4 MG/ML
0.2 AMPUL (ML) INJECTION ONCE
Status: COMPLETED | OUTPATIENT
Start: 2018-01-01 | End: 2018-01-01

## 2018-01-01 RX ORDER — LORAZEPAM 2 MG/ML
INJECTION INTRAMUSCULAR EVERY 4 HOURS PRN
Status: CANCELLED | OUTPATIENT
Start: 2018-01-01

## 2018-01-01 RX ORDER — PROCHLORPERAZINE MALEATE 10 MG
10 TABLET ORAL EVERY 6 HOURS PRN
Status: CANCELLED | OUTPATIENT
Start: 2018-01-01

## 2018-01-01 RX ORDER — HYDROCODONE BITARTRATE AND ACETAMINOPHEN 5; 325 MG/1; MG/1
2 TABLET ORAL AS NEEDED
Status: COMPLETED | OUTPATIENT
Start: 2018-01-01 | End: 2018-01-01

## 2018-01-01 RX ORDER — HYDROCODONE BITARTRATE AND ACETAMINOPHEN 5; 325 MG/1; MG/1
1 TABLET ORAL EVERY 4 HOURS PRN
Status: DISCONTINUED | OUTPATIENT
Start: 2018-01-01 | End: 2018-01-01

## 2018-01-01 RX ORDER — FLUOROURACIL 50 MG/ML
3200 INJECTION, SOLUTION INTRAVENOUS CONTINUOUS
Status: CANCELLED | OUTPATIENT
Start: 2018-01-01

## 2018-01-01 RX ORDER — MIDAZOLAM HYDROCHLORIDE 1 MG/ML
1 INJECTION INTRAMUSCULAR; INTRAVENOUS EVERY 5 MIN PRN
Status: CANCELLED | OUTPATIENT
Start: 2018-01-01

## 2018-01-01 RX ORDER — SODIUM CHLORIDE, SODIUM LACTATE, POTASSIUM CHLORIDE, CALCIUM CHLORIDE 600; 310; 30; 20 MG/100ML; MG/100ML; MG/100ML; MG/100ML
INJECTION, SOLUTION INTRAVENOUS CONTINUOUS
Status: DISCONTINUED | OUTPATIENT
Start: 2018-01-01 | End: 2018-01-01 | Stop reason: HOSPADM

## 2018-01-01 RX ORDER — FENTANYL 25 UG/H
1 PATCH TRANSDERMAL
Status: DISCONTINUED | OUTPATIENT
Start: 2018-01-01 | End: 2018-01-01

## 2018-01-01 RX ORDER — BACITRACIN 50000 [USP'U]/1
INJECTION, POWDER, LYOPHILIZED, FOR SOLUTION INTRAMUSCULAR
Status: COMPLETED
Start: 2018-01-01 | End: 2018-01-01

## 2018-01-01 RX ORDER — FLUMAZENIL 0.1 MG/ML
0.2 INJECTION, SOLUTION INTRAVENOUS AS NEEDED
Status: CANCELLED | OUTPATIENT
Start: 2018-01-01

## 2018-01-01 RX ORDER — HEPARIN SODIUM 5000 [USP'U]/ML
5000 INJECTION, SOLUTION INTRAVENOUS; SUBCUTANEOUS EVERY 8 HOURS SCHEDULED
Status: DISCONTINUED | OUTPATIENT
Start: 2018-01-01 | End: 2018-01-01

## 2018-01-01 RX ORDER — FLUOROURACIL 50 MG/ML
3200 INJECTION, SOLUTION INTRAVENOUS CONTINUOUS
Status: DISCONTINUED | OUTPATIENT
Start: 2018-01-01 | End: 2018-01-01

## 2018-01-01 RX ORDER — DIPHENHYDRAMINE HCL 25 MG
50 CAPSULE ORAL ONCE
Status: CANCELLED | OUTPATIENT
Start: 2019-01-01

## 2018-01-01 RX ORDER — FENTANYL 25 UG/H
1 PATCH TRANSDERMAL
Qty: 10 PATCH | Refills: 0 | Status: SHIPPED | OUTPATIENT
Start: 2018-01-01 | End: 2018-01-01

## 2018-01-01 RX ORDER — HYDROCODONE BITARTRATE AND ACETAMINOPHEN 5; 325 MG/1; MG/1
1 TABLET ORAL AS NEEDED
Status: COMPLETED | OUTPATIENT
Start: 2018-01-01 | End: 2018-01-01

## 2018-01-01 RX ORDER — MORPHINE SULFATE 10 MG/ML
4 INJECTION, SOLUTION INTRAMUSCULAR; INTRAVENOUS ONCE
Status: COMPLETED | OUTPATIENT
Start: 2018-01-01 | End: 2018-01-01

## 2018-01-01 RX ORDER — ONDANSETRON 2 MG/ML
4 INJECTION INTRAMUSCULAR; INTRAVENOUS ONCE
Status: COMPLETED | OUTPATIENT
Start: 2018-01-01 | End: 2018-01-01

## 2018-01-01 RX ORDER — SODIUM CHLORIDE, SODIUM LACTATE, POTASSIUM CHLORIDE, CALCIUM CHLORIDE 600; 310; 30; 20 MG/100ML; MG/100ML; MG/100ML; MG/100ML
INJECTION, SOLUTION INTRAVENOUS CONTINUOUS
Status: DISCONTINUED | OUTPATIENT
Start: 2018-01-01 | End: 2018-01-01

## 2018-01-01 RX ORDER — ONDANSETRON HYDROCHLORIDE 8 MG/1
8 TABLET, FILM COATED ORAL EVERY 8 HOURS PRN
Qty: 30 TABLET | Refills: 3 | Status: SHIPPED | OUTPATIENT
Start: 2018-01-01 | End: 2019-01-01

## 2018-01-01 RX ORDER — HYDROCODONE BITARTRATE AND ACETAMINOPHEN 5; 325 MG/1; MG/1
1-2 TABLET ORAL EVERY 6 HOURS PRN
Status: DISCONTINUED | OUTPATIENT
Start: 2018-01-01 | End: 2018-01-01

## 2018-01-01 RX ORDER — ONDANSETRON 2 MG/ML
4 INJECTION INTRAMUSCULAR; INTRAVENOUS EVERY 6 HOURS PRN
Status: DISCONTINUED | OUTPATIENT
Start: 2018-01-01 | End: 2018-01-01

## 2018-01-01 RX ORDER — ONDANSETRON HYDROCHLORIDE 8 MG/1
8 TABLET, FILM COATED ORAL EVERY 8 HOURS PRN
Qty: 20 TABLET | Refills: 3 | Status: SHIPPED | OUTPATIENT
Start: 2018-01-01 | End: 2019-01-01

## 2018-01-01 RX ORDER — LIDOCAINE HYDROCHLORIDE 10 MG/ML
INJECTION, SOLUTION INFILTRATION; PERINEURAL
Status: COMPLETED
Start: 2018-01-01 | End: 2018-01-01

## 2018-01-01 RX ORDER — MORPHINE SULFATE 4 MG/ML
4 INJECTION, SOLUTION INTRAMUSCULAR; INTRAVENOUS EVERY 2 HOUR PRN
Status: DISCONTINUED | OUTPATIENT
Start: 2018-01-01 | End: 2018-01-01

## 2018-01-01 RX ORDER — LIDOCAINE AND PRILOCAINE 25; 25 MG/G; MG/G
CREAM TOPICAL
Qty: 1 TUBE | Refills: 3 | Status: SHIPPED | OUTPATIENT
Start: 2018-01-01 | End: 2018-01-01

## 2018-01-01 RX ORDER — ATROPINE SULFATE 0.4 MG/ML
0.2 AMPUL (ML) INJECTION ONCE
Status: CANCELLED | OUTPATIENT
Start: 2018-01-01

## 2018-01-01 RX ORDER — DIPHENHYDRAMINE HYDROCHLORIDE 50 MG/ML
12.5 INJECTION INTRAMUSCULAR; INTRAVENOUS AS NEEDED
Status: DISCONTINUED | OUTPATIENT
Start: 2018-01-01 | End: 2018-01-01

## 2018-01-01 RX ORDER — TRAMADOL HYDROCHLORIDE 50 MG/1
50 TABLET ORAL ONCE
Status: COMPLETED | OUTPATIENT
Start: 2018-01-01 | End: 2018-01-01

## 2018-01-01 RX ORDER — PROCHLORPERAZINE MALEATE 10 MG
10 TABLET ORAL EVERY 6 HOURS PRN
Qty: 30 TABLET | Refills: 3 | Status: SHIPPED | OUTPATIENT
Start: 2018-01-01 | End: 2019-01-01

## 2018-01-01 RX ORDER — CIPROFLOXACIN 500 MG/1
500 TABLET, FILM COATED ORAL 2 TIMES DAILY
Qty: 20 TABLET | Refills: 0 | Status: SHIPPED | OUTPATIENT
Start: 2018-01-01 | End: 2018-01-01

## 2018-01-01 RX ORDER — MORPHINE SULFATE 4 MG/ML
4 INJECTION, SOLUTION INTRAMUSCULAR; INTRAVENOUS ONCE
Status: COMPLETED | OUTPATIENT
Start: 2018-01-01 | End: 2018-01-01

## 2018-01-01 RX ORDER — CEFAZOLIN SODIUM/WATER 2 G/20 ML
2 SYRINGE (ML) INTRAVENOUS ONCE
Status: COMPLETED | OUTPATIENT
Start: 2018-01-01 | End: 2018-01-01

## 2018-01-01 RX ORDER — PROCHLORPERAZINE MALEATE 10 MG
10 TABLET ORAL EVERY 6 HOURS PRN
Status: CANCELLED | OUTPATIENT
Start: 2019-01-01

## 2018-01-01 RX ORDER — MORPHINE SULFATE 4 MG/ML
4 INJECTION, SOLUTION INTRAMUSCULAR; INTRAVENOUS ONCE
Status: CANCELLED
Start: 2018-01-01 | End: 2018-01-01

## 2018-01-01 RX ORDER — LORAZEPAM 0.5 MG/1
TABLET ORAL EVERY 4 HOURS PRN
Status: CANCELLED | OUTPATIENT
Start: 2019-01-01

## 2018-01-01 RX ORDER — OXYBUTYNIN CHLORIDE 10 MG/1
10 TABLET, EXTENDED RELEASE ORAL DAILY
Qty: 5 TABLET | Refills: 0 | Status: SHIPPED | OUTPATIENT
Start: 2018-01-01 | End: 2019-01-01

## 2018-01-01 RX ORDER — TRAMADOL HYDROCHLORIDE 50 MG/1
TABLET ORAL EVERY 4 HOURS PRN
Qty: 20 TABLET | Refills: 0 | Status: SHIPPED | OUTPATIENT
Start: 2018-01-01 | End: 2018-01-01

## 2018-01-01 RX ORDER — LOPERAMIDE HYDROCHLORIDE 2 MG/1
2 TABLET ORAL 4 TIMES DAILY PRN
Status: ON HOLD | COMMUNITY
End: 2019-01-01

## 2018-01-01 RX ORDER — HYDROCODONE BITARTRATE AND ACETAMINOPHEN 5; 325 MG/1; MG/1
2 TABLET ORAL EVERY 4 HOURS PRN
Status: DISCONTINUED | OUTPATIENT
Start: 2018-01-01 | End: 2018-01-01

## 2018-01-01 RX ORDER — LOPERAMIDE HYDROCHLORIDE 2 MG/1
2 CAPSULE ORAL 4 TIMES DAILY PRN
Status: DISCONTINUED | OUTPATIENT
Start: 2018-01-01 | End: 2018-01-01

## 2018-01-01 RX ORDER — FENTANYL 25 UG/H
1 PATCH TRANSDERMAL
Qty: 10 PATCH | Refills: 0 | Status: SHIPPED | OUTPATIENT
Start: 2018-01-01 | End: 2019-01-01

## 2018-01-01 RX ORDER — METOCLOPRAMIDE HYDROCHLORIDE 5 MG/ML
10 INJECTION INTRAMUSCULAR; INTRAVENOUS EVERY 6 HOURS PRN
Status: CANCELLED | OUTPATIENT
Start: 2019-01-01

## 2018-01-01 RX ORDER — SODIUM CHLORIDE 9 MG/ML
INJECTION, SOLUTION INTRAVENOUS CONTINUOUS
Status: DISCONTINUED | OUTPATIENT
Start: 2018-01-01 | End: 2018-01-01

## 2018-01-01 RX ORDER — SODIUM CHLORIDE 9 MG/ML
INJECTION, SOLUTION INTRAVENOUS CONTINUOUS
Status: CANCELLED | OUTPATIENT
Start: 2018-01-01

## 2018-01-01 RX ORDER — SODIUM CHLORIDE 0.9 % (FLUSH) 0.9 %
10 SYRINGE (ML) INJECTION ONCE
Status: COMPLETED | OUTPATIENT
Start: 2018-01-01 | End: 2018-01-01

## 2018-01-01 RX ORDER — MORPHINE SULFATE 10 MG/ML
10 INJECTION, SOLUTION INTRAMUSCULAR; INTRAVENOUS ONCE
Status: COMPLETED | OUTPATIENT
Start: 2018-01-01 | End: 2018-01-01

## 2018-01-01 RX ORDER — ACETAMINOPHEN 325 MG/1
650 TABLET ORAL ONCE
Status: COMPLETED | OUTPATIENT
Start: 2018-01-01 | End: 2018-01-01

## 2018-01-01 RX ORDER — DIPHENHYDRAMINE HYDROCHLORIDE AND LIDOCAINE HYDROCHLORIDE AND ALUMINUM HYDROXIDE AND MAGNESIUM HYDRO
5 KIT
Qty: 237 ML | Refills: 1 | Status: SHIPPED | OUTPATIENT
Start: 2018-01-01 | End: 2018-01-01

## 2018-01-01 RX ORDER — CEFAZOLIN SODIUM 1 G/3ML
INJECTION, POWDER, FOR SOLUTION INTRAMUSCULAR; INTRAVENOUS
Status: DISCONTINUED | OUTPATIENT
Start: 2018-01-01 | End: 2018-01-01 | Stop reason: HOSPADM

## 2018-01-01 RX ORDER — HEPARIN SODIUM 5000 [USP'U]/ML
INJECTION, SOLUTION INTRAVENOUS; SUBCUTANEOUS
Status: COMPLETED
Start: 2018-01-01 | End: 2018-01-01

## 2018-01-01 RX ORDER — PROCHLORPERAZINE MALEATE 10 MG
10 TABLET ORAL EVERY 6 HOURS PRN
Qty: 30 TABLET | Refills: 3 | Status: SHIPPED | OUTPATIENT
Start: 2018-01-01

## 2018-01-01 RX ORDER — FLUMAZENIL 0.1 MG/ML
0.2 INJECTION, SOLUTION INTRAVENOUS AS NEEDED
Status: DISCONTINUED | OUTPATIENT
Start: 2018-01-01 | End: 2018-01-01

## 2018-01-01 RX ORDER — MORPHINE SULFATE 4 MG/ML
1 INJECTION, SOLUTION INTRAMUSCULAR; INTRAVENOUS EVERY 2 HOUR PRN
Status: DISCONTINUED | OUTPATIENT
Start: 2018-01-01 | End: 2018-01-01

## 2018-01-01 RX ORDER — DIPHENOXYLATE HYDROCHLORIDE AND ATROPINE SULFATE 2.5; .025 MG/1; MG/1
1-2 TABLET ORAL 4 TIMES DAILY PRN
Qty: 90 TABLET | Refills: 3 | Status: SHIPPED | OUTPATIENT
Start: 2018-01-01 | End: 2019-01-01

## 2018-01-01 RX ORDER — MIDAZOLAM HYDROCHLORIDE 1 MG/ML
1 INJECTION INTRAMUSCULAR; INTRAVENOUS EVERY 5 MIN PRN
Status: DISCONTINUED | OUTPATIENT
Start: 2018-01-01 | End: 2018-01-01

## 2018-01-01 RX ORDER — LOPERAMIDE HYDROCHLORIDE 2 MG/1
2 CAPSULE ORAL ONCE
Status: COMPLETED | OUTPATIENT
Start: 2018-01-01 | End: 2018-01-01

## 2018-01-01 RX ORDER — HYDROMORPHONE HYDROCHLORIDE 2 MG/1
2 TABLET ORAL ONCE
Status: CANCELLED
Start: 2018-01-01 | End: 2018-01-01

## 2018-01-01 RX ORDER — LORAZEPAM 2 MG/ML
INJECTION INTRAMUSCULAR EVERY 4 HOURS PRN
Status: CANCELLED | OUTPATIENT
Start: 2019-01-01

## 2018-01-01 RX ORDER — CEFAZOLIN SODIUM/WATER 2 G/20 ML
SYRINGE (ML) INTRAVENOUS
Status: COMPLETED
Start: 2018-01-01 | End: 2018-01-01

## 2018-01-01 RX ORDER — SODIUM CHLORIDE 9 MG/ML
125 INJECTION, SOLUTION INTRAVENOUS CONTINUOUS
Status: DISCONTINUED | OUTPATIENT
Start: 2018-01-01 | End: 2018-01-01

## 2018-01-01 RX ORDER — OLANZAPINE 5 MG/1
5 TABLET ORAL NIGHTLY
Qty: 30 TABLET | Refills: 0 | Status: SHIPPED | OUTPATIENT
Start: 2018-01-01 | End: 2018-01-01

## 2018-01-01 RX ORDER — MORPHINE SULFATE 4 MG/ML
2 INJECTION, SOLUTION INTRAMUSCULAR; INTRAVENOUS EVERY 2 HOUR PRN
Status: DISCONTINUED | OUTPATIENT
Start: 2018-01-01 | End: 2018-01-01

## 2018-01-01 RX ORDER — HYDROMORPHONE HYDROCHLORIDE 2 MG/1
2 TABLET ORAL ONCE
Status: COMPLETED | OUTPATIENT
Start: 2018-01-01 | End: 2018-01-01

## 2018-01-01 RX ORDER — LIDOCAINE HYDROCHLORIDE 20 MG/ML
JELLY TOPICAL AS NEEDED
Status: DISCONTINUED | OUTPATIENT
Start: 2018-01-01 | End: 2018-01-01 | Stop reason: HOSPADM

## 2018-01-01 RX ORDER — METHENAMINE, SODIUM PHOSPHATE, MONOBASIC, MONOHYDRATE, PHENYL SALICYLATE, METHYLENE BLUE, AND HYOSCYAMINE SULFATE 120; 40.8; 36; 10; .12 MG/1; MG/1; MG/1; MG/1; MG/1
1 CAPSULE ORAL 3 TIMES DAILY
Qty: 15 CAPSULE | Refills: 3 | Status: SHIPPED | OUTPATIENT
Start: 2018-01-01 | End: 2018-01-01

## 2018-01-01 RX ORDER — ACETAMINOPHEN 325 MG/1
650 TABLET ORAL EVERY 6 HOURS PRN
Status: DISCONTINUED | OUTPATIENT
Start: 2018-01-01 | End: 2018-01-01

## 2018-01-01 RX ORDER — ACETAMINOPHEN 500 MG
1000 TABLET ORAL ONCE
Status: DISCONTINUED | OUTPATIENT
Start: 2018-01-01 | End: 2018-01-01 | Stop reason: HOSPADM

## 2018-01-01 RX ORDER — MEPERIDINE HYDROCHLORIDE 25 MG/ML
12.5 INJECTION INTRAMUSCULAR; INTRAVENOUS; SUBCUTANEOUS AS NEEDED
Status: DISCONTINUED | OUTPATIENT
Start: 2018-01-01 | End: 2018-01-01

## 2018-01-01 RX ORDER — DIPHENOXYLATE HYDROCHLORIDE AND ATROPINE SULFATE 2.5; .025 MG/1; MG/1
1-2 TABLET ORAL 4 TIMES DAILY PRN
Status: DISCONTINUED | OUTPATIENT
Start: 2018-01-01 | End: 2018-01-01

## 2018-01-01 RX ORDER — NALOXONE HYDROCHLORIDE 0.4 MG/ML
80 INJECTION, SOLUTION INTRAMUSCULAR; INTRAVENOUS; SUBCUTANEOUS AS NEEDED
Status: DISCONTINUED | OUTPATIENT
Start: 2018-01-01 | End: 2018-01-01 | Stop reason: HOSPADM

## 2018-01-01 RX ORDER — ATROPINE SULFATE 0.4 MG/ML
0.2 AMPUL (ML) INJECTION ONCE
Status: CANCELLED | OUTPATIENT
Start: 2019-01-01

## 2018-01-01 RX ORDER — ONDANSETRON 2 MG/ML
4 INJECTION INTRAMUSCULAR; INTRAVENOUS AS NEEDED
Status: DISCONTINUED | OUTPATIENT
Start: 2018-01-01 | End: 2018-01-01 | Stop reason: HOSPADM

## 2018-01-01 RX ORDER — NALOXONE HYDROCHLORIDE 0.4 MG/ML
80 INJECTION, SOLUTION INTRAMUSCULAR; INTRAVENOUS; SUBCUTANEOUS AS NEEDED
Status: DISCONTINUED | OUTPATIENT
Start: 2018-01-01 | End: 2018-01-01

## 2018-01-01 RX ORDER — ACETAMINOPHEN 325 MG/1
650 TABLET ORAL ONCE
Status: CANCELLED | OUTPATIENT
Start: 2019-01-01

## 2018-01-01 RX ORDER — MORPHINE SULFATE 4 MG/ML
4 INJECTION, SOLUTION INTRAMUSCULAR; INTRAVENOUS EVERY 30 MIN PRN
Status: DISCONTINUED | OUTPATIENT
Start: 2018-01-01 | End: 2018-01-01

## 2018-01-01 RX ORDER — NALOXONE HYDROCHLORIDE 0.4 MG/ML
80 INJECTION, SOLUTION INTRAMUSCULAR; INTRAVENOUS; SUBCUTANEOUS AS NEEDED
Status: CANCELLED | OUTPATIENT
Start: 2018-01-01

## 2018-01-01 RX ORDER — MIDAZOLAM HYDROCHLORIDE 1 MG/ML
INJECTION INTRAMUSCULAR; INTRAVENOUS
Status: COMPLETED
Start: 2018-01-01 | End: 2018-01-01

## 2018-01-01 RX ORDER — MEPERIDINE HYDROCHLORIDE 25 MG/ML
12.5 INJECTION INTRAMUSCULAR; INTRAVENOUS; SUBCUTANEOUS AS NEEDED
Status: DISCONTINUED | OUTPATIENT
Start: 2018-01-01 | End: 2018-01-01 | Stop reason: HOSPADM

## 2018-01-01 RX ORDER — METOCLOPRAMIDE HYDROCHLORIDE 5 MG/ML
10 INJECTION INTRAMUSCULAR; INTRAVENOUS EVERY 8 HOURS PRN
Status: DISCONTINUED | OUTPATIENT
Start: 2018-01-01 | End: 2018-01-01

## 2018-01-01 RX ORDER — MORPHINE SULFATE 15 MG/1
15 TABLET, FILM COATED, EXTENDED RELEASE ORAL EVERY 12 HOURS SCHEDULED
Status: DISCONTINUED | OUTPATIENT
Start: 2018-01-01 | End: 2018-01-01

## 2018-01-01 RX ORDER — MORPHINE SULFATE 4 MG/ML
2 INJECTION, SOLUTION INTRAMUSCULAR; INTRAVENOUS EVERY 5 MIN PRN
Status: DISCONTINUED | OUTPATIENT
Start: 2018-01-01 | End: 2018-01-01 | Stop reason: HOSPADM

## 2018-01-01 RX ORDER — CEPHALEXIN 500 MG/1
500 CAPSULE ORAL 4 TIMES DAILY
Qty: 40 CAPSULE | Refills: 0 | Status: SHIPPED | OUTPATIENT
Start: 2018-01-01 | End: 2018-01-01

## 2018-01-01 RX ORDER — HYDROCODONE BITARTRATE AND ACETAMINOPHEN 5; 325 MG/1; MG/1
1 TABLET ORAL EVERY 6 HOURS PRN
Status: DISCONTINUED | OUTPATIENT
Start: 2018-01-01 | End: 2018-01-01

## 2018-01-01 RX ORDER — MORPHINE SULFATE 15 MG/1
15 TABLET, FILM COATED, EXTENDED RELEASE ORAL EVERY 12 HOURS SCHEDULED
Qty: 60 TABLET | Refills: 0 | Status: ON HOLD | OUTPATIENT
Start: 2018-01-01 | End: 2018-01-01 | Stop reason: ALTCHOICE

## 2018-01-01 RX ORDER — ENOXAPARIN SODIUM 100 MG/ML
40 INJECTION SUBCUTANEOUS DAILY
Status: DISCONTINUED | OUTPATIENT
Start: 2018-01-01 | End: 2018-01-01

## 2018-01-01 RX ORDER — ONDANSETRON 2 MG/ML
8 INJECTION INTRAMUSCULAR; INTRAVENOUS EVERY 6 HOURS PRN
Status: CANCELLED | OUTPATIENT
Start: 2019-01-01

## 2018-01-01 RX ORDER — ONDANSETRON 4 MG/1
4 TABLET, ORALLY DISINTEGRATING ORAL EVERY 4 HOURS PRN
Qty: 10 TABLET | Refills: 0 | Status: SHIPPED | OUTPATIENT
Start: 2018-01-01 | End: 2018-01-01

## 2018-01-01 RX ORDER — MORPHINE SULFATE 4 MG/ML
2 INJECTION, SOLUTION INTRAMUSCULAR; INTRAVENOUS EVERY 5 MIN PRN
Status: DISCONTINUED | OUTPATIENT
Start: 2018-01-01 | End: 2018-01-01

## 2018-01-01 RX ORDER — MIDAZOLAM HYDROCHLORIDE 1 MG/ML
1 INJECTION INTRAMUSCULAR; INTRAVENOUS EVERY 5 MIN PRN
Status: COMPLETED | OUTPATIENT
Start: 2018-01-01 | End: 2018-01-01

## 2018-01-01 RX ORDER — DIPHENHYDRAMINE HYDROCHLORIDE 50 MG/ML
12.5 INJECTION INTRAMUSCULAR; INTRAVENOUS AS NEEDED
Status: DISCONTINUED | OUTPATIENT
Start: 2018-01-01 | End: 2018-01-01 | Stop reason: HOSPADM

## 2018-01-01 RX ORDER — LIDOCAINE HYDROCHLORIDE AND EPINEPHRINE 15; 5 MG/ML; UG/ML
INJECTION, SOLUTION EPIDURAL
Status: COMPLETED
Start: 2018-01-01 | End: 2018-01-01

## 2018-01-01 RX ORDER — CHLORPROMAZINE HYDROCHLORIDE 10 MG/1
10 TABLET, FILM COATED ORAL 3 TIMES DAILY PRN
Qty: 30 TABLET | Refills: 0 | Status: SHIPPED | OUTPATIENT
Start: 2018-01-01 | End: 2019-01-01

## 2018-01-01 RX ORDER — METOCLOPRAMIDE HYDROCHLORIDE 5 MG/ML
10 INJECTION INTRAMUSCULAR; INTRAVENOUS AS NEEDED
Status: DISCONTINUED | OUTPATIENT
Start: 2018-01-01 | End: 2018-01-01 | Stop reason: HOSPADM

## 2018-01-01 RX ORDER — ONDANSETRON 2 MG/ML
4 INJECTION INTRAMUSCULAR; INTRAVENOUS AS NEEDED
Status: DISCONTINUED | OUTPATIENT
Start: 2018-01-01 | End: 2018-01-01

## 2018-01-01 RX ORDER — ACETAMINOPHEN 325 MG/1
650 TABLET ORAL EVERY 4 HOURS PRN
Status: DISCONTINUED | OUTPATIENT
Start: 2018-01-01 | End: 2018-01-01

## 2018-01-01 RX ORDER — SODIUM CHLORIDE 0.9 % (FLUSH) 0.9 %
10 SYRINGE (ML) INJECTION ONCE
Status: DISCONTINUED | OUTPATIENT
Start: 2018-01-01 | End: 2018-01-01

## 2018-01-01 RX ORDER — DIPHENHYDRAMINE HCL 25 MG
50 CAPSULE ORAL ONCE
Status: COMPLETED | OUTPATIENT
Start: 2018-01-01 | End: 2018-01-01

## 2018-01-01 RX ADMIN — FLUOROURACIL 6250 MG: 50 INJECTION, SOLUTION INTRAVENOUS at 15:59:00

## 2018-01-01 RX ADMIN — SODIUM CHLORIDE 0.9 % (FLUSH) 10 ML: 0.9 % SYRINGE (ML) INJECTION at 14:55:00

## 2018-01-01 RX ADMIN — ATROPINE SULFATE 0.2 MG: 0.4 MG/ML AMPUL (ML) INJECTION at 13:54:00

## 2018-01-01 RX ADMIN — ATROPINE SULFATE 0.2 MG: 0.4 MG/ML AMPUL (ML) INJECTION at 13:21:00

## 2018-01-01 RX ADMIN — MIDAZOLAM HYDROCHLORIDE 1 MG: 1 INJECTION INTRAMUSCULAR; INTRAVENOUS at 09:53:00

## 2018-01-01 RX ADMIN — ATROPINE SULFATE 0.2 MG: 0.4 MG/ML AMPUL (ML) INJECTION at 15:14:00

## 2018-01-01 RX ADMIN — FLUOROURACIL 6250 MG: 50 INJECTION, SOLUTION INTRAVENOUS at 16:22:00

## 2018-01-01 RX ADMIN — SODIUM CHLORIDE 0.9 % (FLUSH) 10 ML: 0.9 % SYRINGE (ML) INJECTION at 15:30:00

## 2018-01-01 RX ADMIN — FLUOROURACIL 6250 MG: 50 INJECTION, SOLUTION INTRAVENOUS at 15:57:00

## 2018-01-01 RX ADMIN — MORPHINE SULFATE 4 MG: 10 INJECTION, SOLUTION INTRAMUSCULAR; INTRAVENOUS at 15:42:00

## 2018-01-01 RX ADMIN — MIDAZOLAM HYDROCHLORIDE 0.5 MG: 1 INJECTION INTRAMUSCULAR; INTRAVENOUS at 10:00:00

## 2018-01-01 RX ADMIN — FLUOROURACIL 6250 MG: 50 INJECTION, SOLUTION INTRAVENOUS at 15:37:00

## 2018-01-01 RX ADMIN — FLUOROURACIL 6250 MG: 50 INJECTION, SOLUTION INTRAVENOUS at 15:52:00

## 2018-01-01 RX ADMIN — MIDAZOLAM HYDROCHLORIDE 0.5 MG: 1 INJECTION INTRAMUSCULAR; INTRAVENOUS at 09:57:00

## 2018-01-01 RX ADMIN — FLUOROURACIL 6250 MG: 50 INJECTION, SOLUTION INTRAVENOUS at 15:49:00

## 2018-01-01 RX ADMIN — ACETAMINOPHEN 650 MG: 325 TABLET ORAL at 12:19:00

## 2018-01-01 RX ADMIN — SODIUM CHLORIDE 0.9 % (FLUSH) 10 ML: 0.9 % SYRINGE (ML) INJECTION at 15:46:00

## 2018-01-01 RX ADMIN — ATROPINE SULFATE 0.2 MG: 0.4 MG/ML AMPUL (ML) INJECTION at 13:31:00

## 2018-01-01 RX ADMIN — ATROPINE SULFATE 0.2 MG: 0.4 MG/ML AMPUL (ML) INJECTION at 11:34:00

## 2018-01-01 RX ADMIN — FLUOROURACIL 6250 MG: 50 INJECTION, SOLUTION INTRAVENOUS at 13:58:00

## 2018-01-01 RX ADMIN — LOPERAMIDE HYDROCHLORIDE 2 MG: 2 CAPSULE ORAL at 14:46:00

## 2018-01-01 RX ADMIN — SODIUM CHLORIDE 0.9 % (FLUSH) 10 ML: 0.9 % SYRINGE (ML) INJECTION at 10:12:00

## 2018-01-01 RX ADMIN — DIPHENHYDRAMINE HCL 50 MG: 25 MG CAPSULE ORAL at 12:18:00

## 2018-01-01 RX ADMIN — ATROPINE SULFATE 0.2 MG: 0.4 MG/ML AMPUL (ML) INJECTION at 13:58:00

## 2018-01-01 RX ADMIN — ATROPINE SULFATE 0.2 MG: 0.4 MG/ML AMPUL (ML) INJECTION at 14:22:00

## 2018-01-01 RX ADMIN — HYDROMORPHONE HYDROCHLORIDE 2 MG: 2 TABLET ORAL at 09:03:00

## 2018-01-01 RX ADMIN — LOPERAMIDE HYDROCHLORIDE 2 MG: 2 CAPSULE ORAL at 13:39:00

## 2018-01-01 RX ADMIN — SODIUM CHLORIDE: 9 INJECTION, SOLUTION INTRAVENOUS at 09:15:00

## 2018-01-01 RX ADMIN — SODIUM CHLORIDE 0.9 % (FLUSH) 10 ML: 0.9 % SYRINGE (ML) INJECTION at 12:50:00

## 2018-01-01 RX ADMIN — ATROPINE SULFATE 0.2 MG: 0.4 MG/ML AMPUL (ML) INJECTION at 14:13:00

## 2018-01-01 RX ADMIN — ATROPINE SULFATE 0.2 MG: 0.4 MG/ML AMPUL (ML) INJECTION at 13:33:00

## 2018-01-24 ENCOUNTER — TELEPHONE (OUTPATIENT)
Dept: HEMATOLOGY/ONCOLOGY | Facility: HOSPITAL | Age: 58
End: 2018-01-24

## 2018-01-25 ENCOUNTER — OFFICE VISIT (OUTPATIENT)
Dept: HEMATOLOGY/ONCOLOGY | Age: 58
End: 2018-01-25
Attending: INTERNAL MEDICINE
Payer: COMMERCIAL

## 2018-01-25 VITALS
HEART RATE: 71 BPM | TEMPERATURE: 98 F | OXYGEN SATURATION: 100 % | RESPIRATION RATE: 18 BRPM | BODY MASS INDEX: 24 KG/M2 | WEIGHT: 164.38 LBS

## 2018-01-25 DIAGNOSIS — C20 RECTAL CANCER (HCC): Primary | ICD-10-CM

## 2018-01-25 PROCEDURE — 99214 OFFICE O/P EST MOD 30 MIN: CPT | Performed by: INTERNAL MEDICINE

## 2018-01-25 NOTE — PROGRESS NOTES
Pt here for 4 month MD f/u. Pt had surgery 12/11/17, healing well. Energy level is back to normal, appetite is good. Denies pain. No bowel issues.      Education Record    Learner:  Patient    Disease / Diagnosis:    Barriers / Limitations:  None   Comments

## 2018-01-26 ENCOUNTER — TELEPHONE (OUTPATIENT)
Dept: HEMATOLOGY/ONCOLOGY | Facility: HOSPITAL | Age: 58
End: 2018-01-26

## 2018-01-26 DIAGNOSIS — C20 RECTAL CANCER (HCC): Primary | ICD-10-CM

## 2018-01-26 RX ORDER — CAPECITABINE 500 MG/1
1500 TABLET, FILM COATED ORAL 2 TIMES DAILY
Qty: 84 TABLET | Refills: 4 | Status: SHIPPED | OUTPATIENT
Start: 2018-01-26 | End: 2018-01-30

## 2018-01-26 RX ORDER — CAPECITABINE 150 MG/1
300 TABLET, FILM COATED ORAL 2 TIMES DAILY
Qty: 56 TABLET | Refills: 4 | Status: SHIPPED | OUTPATIENT
Start: 2018-01-26 | End: 2018-01-30

## 2018-01-30 ENCOUNTER — TELEPHONE (OUTPATIENT)
Dept: HEMATOLOGY/ONCOLOGY | Facility: HOSPITAL | Age: 58
End: 2018-01-30

## 2018-01-30 DIAGNOSIS — C20 RECTAL CANCER (HCC): ICD-10-CM

## 2018-01-30 RX ORDER — CAPECITABINE 500 MG/1
1500 TABLET, FILM COATED ORAL 2 TIMES DAILY
Qty: 84 TABLET | Refills: 4 | Status: SHIPPED | OUTPATIENT
Start: 2018-01-30 | End: 2018-04-26 | Stop reason: ALTCHOICE

## 2018-01-30 RX ORDER — CAPECITABINE 150 MG/1
300 TABLET, FILM COATED ORAL 2 TIMES DAILY
Qty: 56 TABLET | Refills: 4 | Status: SHIPPED | OUTPATIENT
Start: 2018-01-30 | End: 2018-03-15

## 2018-01-30 NOTE — PROGRESS NOTES
[facility]    PATIENT'S NAME: Eli Pate   ATTENDING PHYSICIAN: Negar Atkinson M.D.    PATIENT ACCOUNT #: [de-identified] LOCATION: [address] [place] [address] RECORD #: [id number] YOB: 1960   DATE OF SERVICE: 01/25/2018       CANCER ARNIE recovered with conservative management. He has had no further problems with nausea and vomiting. He has no abdominal pain. He has healed well from the surgery. He has seen Dr. Humberto Quiñonez in followup. He is here today to discuss further management.   He note, he had 1 lymph node that was negative but had pools of mucin suggestive of prior involvement. As a result, I have recommended to him that he be treated with adjuvant chemotherapy.   Standard options would include either 8 cycles of FOLFOX or 5 cycles

## 2018-01-30 NOTE — TELEPHONE ENCOUNTER
Patient states he got a call that the Xeloda needed to come from a specialty pharmacy but he can not remember name and he has not heard from them. Will explore and a call him back.

## 2018-01-31 ENCOUNTER — TELEPHONE (OUTPATIENT)
Dept: HEMATOLOGY/ONCOLOGY | Facility: HOSPITAL | Age: 58
End: 2018-01-31

## 2018-02-01 ENCOUNTER — OFFICE VISIT (OUTPATIENT)
Dept: HEMATOLOGY/ONCOLOGY | Age: 58
End: 2018-02-01
Attending: INTERNAL MEDICINE
Payer: COMMERCIAL

## 2018-02-01 VITALS
SYSTOLIC BLOOD PRESSURE: 117 MMHG | HEART RATE: 82 BPM | DIASTOLIC BLOOD PRESSURE: 76 MMHG | TEMPERATURE: 97 F | RESPIRATION RATE: 82 BRPM | OXYGEN SATURATION: 99 % | BODY MASS INDEX: 24 KG/M2 | WEIGHT: 168.38 LBS

## 2018-02-01 DIAGNOSIS — Z71.9 HEALTH EDUCATION: ICD-10-CM

## 2018-02-01 DIAGNOSIS — C20 RECTAL CANCER (HCC): Primary | ICD-10-CM

## 2018-02-01 PROBLEM — K62.7 RADIATION INDUCED PROCTITIS: Status: RESOLVED | Noted: 2017-09-28 | Resolved: 2018-02-01

## 2018-02-01 PROBLEM — R10.9 ABDOMINAL CRAMPING: Status: RESOLVED | Noted: 2017-09-12 | Resolved: 2018-02-01

## 2018-02-01 PROBLEM — E16.2 HYPOGLYCEMIA: Status: RESOLVED | Noted: 2017-09-11 | Resolved: 2018-02-01

## 2018-02-01 PROBLEM — R55 SYNCOPE, UNSPECIFIED SYNCOPE TYPE: Status: RESOLVED | Noted: 2017-09-11 | Resolved: 2018-02-01

## 2018-02-01 PROBLEM — T45.1X5A CHEMOTHERAPY INDUCED DIARRHEA: Status: RESOLVED | Noted: 2017-09-28 | Resolved: 2018-02-01

## 2018-02-01 PROBLEM — R55 SYNCOPE: Status: RESOLVED | Noted: 2017-09-11 | Resolved: 2018-02-01

## 2018-02-01 PROBLEM — R53.1 WEAKNESS GENERALIZED: Status: RESOLVED | Noted: 2017-09-11 | Resolved: 2018-02-01

## 2018-02-01 PROBLEM — R21 MACULOPAPULAR RASH, GENERALIZED: Status: RESOLVED | Noted: 2017-09-28 | Resolved: 2018-02-01

## 2018-02-01 PROBLEM — R21 MACULOPAPULAR RASH, GENERALIZED: Status: RESOLVED | Noted: 2017-09-12 | Resolved: 2018-02-01

## 2018-02-01 PROBLEM — K52.1 CHEMOTHERAPY INDUCED DIARRHEA: Status: RESOLVED | Noted: 2017-09-28 | Resolved: 2018-02-01

## 2018-02-01 PROBLEM — K62.7: Status: RESOLVED | Noted: 2017-09-18 | Resolved: 2018-02-01

## 2018-02-01 PROBLEM — K62.89 RECTAL MASS: Status: RESOLVED | Noted: 2017-07-31 | Resolved: 2018-02-01

## 2018-02-01 LAB
ALBUMIN SERPL-MCNC: 3 G/DL (ref 3.5–4.8)
ALP LIVER SERPL-CCNC: 88 U/L (ref 45–117)
ALT SERPL-CCNC: 22 U/L (ref 17–63)
AST SERPL-CCNC: 18 U/L (ref 15–41)
BASOPHILS # BLD AUTO: 0.05 X10(3) UL (ref 0–0.1)
BASOPHILS NFR BLD AUTO: 1 %
BILIRUB SERPL-MCNC: 0.2 MG/DL (ref 0.1–2)
BUN BLD-MCNC: 15 MG/DL (ref 8–20)
CALCIUM BLD-MCNC: 8.7 MG/DL (ref 8.3–10.3)
CHLORIDE: 103 MMOL/L (ref 101–111)
CO2: 29 MMOL/L (ref 22–32)
CREAT BLD-MCNC: 0.99 MG/DL (ref 0.7–1.3)
EOSINOPHIL # BLD AUTO: 0.1 X10(3) UL (ref 0–0.3)
EOSINOPHIL NFR BLD AUTO: 2 %
ERYTHROCYTE [DISTWIDTH] IN BLOOD BY AUTOMATED COUNT: 14.3 % (ref 11.5–16)
GLUCOSE BLD-MCNC: 80 MG/DL (ref 70–99)
HCT VFR BLD AUTO: 37 % (ref 37–53)
HGB BLD-MCNC: 11.8 G/DL (ref 13–17)
IMMATURE GRANULOCYTE COUNT: 0.02 X10(3) UL (ref 0–1)
IMMATURE GRANULOCYTE RATIO %: 0.4 %
LYMPHOCYTES # BLD AUTO: 0.51 X10(3) UL (ref 0.9–4)
LYMPHOCYTES NFR BLD AUTO: 10.3 %
M PROTEIN MFR SERPL ELPH: 7.4 G/DL (ref 6.1–8.3)
MCH RBC QN AUTO: 29.9 PG (ref 27–33.2)
MCHC RBC AUTO-ENTMCNC: 31.9 G/DL (ref 31–37)
MCV RBC AUTO: 93.7 FL (ref 80–99)
MONOCYTES # BLD AUTO: 0.66 X10(3) UL (ref 0.1–0.6)
MONOCYTES NFR BLD AUTO: 13.3 %
NEUTROPHIL ABS PRELIM: 3.62 X10 (3) UL (ref 1.3–6.7)
NEUTROPHILS # BLD AUTO: 3.62 X10(3) UL (ref 1.3–6.7)
NEUTROPHILS NFR BLD AUTO: 73 %
PLATELET # BLD AUTO: 247 10(3)UL (ref 150–450)
POTASSIUM SERPL-SCNC: 3.7 MMOL/L (ref 3.6–5.1)
RBC # BLD AUTO: 3.95 X10(6)UL (ref 4.3–5.7)
RED CELL DISTRIBUTION WIDTH-SD: 48.5 FL (ref 35.1–46.3)
SODIUM SERPL-SCNC: 140 MMOL/L (ref 136–144)
WBC # BLD AUTO: 5 X10(3) UL (ref 4–13)

## 2018-02-01 PROCEDURE — 96413 CHEMO IV INFUSION 1 HR: CPT

## 2018-02-01 PROCEDURE — 96375 TX/PRO/DX INJ NEW DRUG ADDON: CPT

## 2018-02-01 PROCEDURE — 85025 COMPLETE CBC W/AUTO DIFF WBC: CPT

## 2018-02-01 PROCEDURE — 80053 COMPREHEN METABOLIC PANEL: CPT

## 2018-02-01 PROCEDURE — 36415 COLL VENOUS BLD VENIPUNCTURE: CPT

## 2018-02-01 PROCEDURE — 96415 CHEMO IV INFUSION ADDL HR: CPT

## 2018-02-01 PROCEDURE — 99215 OFFICE O/P EST HI 40 MIN: CPT | Performed by: CLINICAL NURSE SPECIALIST

## 2018-02-01 RX ORDER — METOCLOPRAMIDE HYDROCHLORIDE 5 MG/ML
10 INJECTION INTRAMUSCULAR; INTRAVENOUS EVERY 6 HOURS PRN
Status: CANCELLED | OUTPATIENT
Start: 2018-02-01

## 2018-02-01 RX ORDER — ONDANSETRON 2 MG/ML
8 INJECTION INTRAMUSCULAR; INTRAVENOUS EVERY 6 HOURS PRN
Status: CANCELLED | OUTPATIENT
Start: 2018-02-01

## 2018-02-01 RX ORDER — PROCHLORPERAZINE MALEATE 10 MG
10 TABLET ORAL EVERY 6 HOURS PRN
Status: CANCELLED | OUTPATIENT
Start: 2018-02-01

## 2018-02-01 RX ORDER — LORAZEPAM 0.5 MG/1
TABLET ORAL EVERY 4 HOURS PRN
Status: CANCELLED | OUTPATIENT
Start: 2018-02-01

## 2018-02-01 RX ORDER — LORAZEPAM 2 MG/ML
INJECTION INTRAMUSCULAR EVERY 4 HOURS PRN
Status: CANCELLED | OUTPATIENT
Start: 2018-02-01

## 2018-02-01 NOTE — PROGRESS NOTES
Chemotherapy Education    Learner:  Patient    Barriers / Limitations:  Emotional factors    Chemotherapy education goals:  · Learn the drug names  · Administration schedule  · Routes of administration  · Treatment setting    Drug names:  Oxaliplatin, Jeffery Pulse N/A    Notify MD/RN of any changes or problems:  Achieved    Comments:    Treatment Effects on Emotional Status:    Potential mood changes, depression, nervousness, difficulty sleeping:  Achieved    Importance of support system:  Achieved    Notify MD/RN o skin, site unspecified     Unspecified asthma(493.90)     Rectal cancer ([facility]     Anxiety     Reactive depression       Medical History:  Past Medical History:   Diagnosis Date   • Cancer ([facility]     basal cell hands,ears, face   • Syncope    • Visual impair Rfl: 4  •  Loperamide HCl 1 MG/7.5ML Oral Liquid, Take 2 mg by mouth 3 (three) times daily as needed for Diarrhea., Disp: , Rfl:   •  acetaminophen 325 MG Oral Tab, Take 325 mg by mouth every 6 (six) hours as needed for Pain., Disp: , Rfl:   •  ALPRAZolam Collection Time: 02/01/18  9:59 AM   Result Value Ref Range   WBC 5.0 4.0 - 13.0 x10(3) uL   RBC 3.95 (L) 4.30 - 5.70 x10(6)uL   HGB 11.8 (L) 13.0 - 17.0 g/dL   HCT 37.0 37.0 - 53.0 %   .0 150.0 - 450.0 10(3)uL   MCV 93.7 80.0 - 99.0 fL   MCH 29. 9

## 2018-02-02 ENCOUNTER — TELEPHONE (OUTPATIENT)
Dept: HEMATOLOGY/ONCOLOGY | Facility: HOSPITAL | Age: 58
End: 2018-02-02

## 2018-02-02 NOTE — TELEPHONE ENCOUNTER
Date of Treatment:  2/1/18                                Type of Chemo:  Xelox    Comments:  Patient with c/o cold sensitivity in fingers and throat, jaw spasms. No nausea or vomiting, no constipation. Some loose stools today.      Recommendations:  Reinfo

## 2018-02-05 ENCOUNTER — TELEPHONE (OUTPATIENT)
Dept: HEMATOLOGY/ONCOLOGY | Facility: HOSPITAL | Age: 58
End: 2018-02-05

## 2018-02-05 NOTE — TELEPHONE ENCOUNTER
Received a call from pt requesting a call back. No information left on VM. Attempted to call pt back at requested phone number [phone], but NA. Left VM requesting call back to triage line.

## 2018-02-08 ENCOUNTER — APPOINTMENT (OUTPATIENT)
Dept: HEMATOLOGY/ONCOLOGY | Age: 58
End: 2018-02-08
Attending: INTERNAL MEDICINE
Payer: COMMERCIAL

## 2018-02-08 ENCOUNTER — OFFICE VISIT (OUTPATIENT)
Dept: HEMATOLOGY/ONCOLOGY | Age: 58
End: 2018-02-08
Attending: INTERNAL MEDICINE
Payer: COMMERCIAL

## 2018-02-08 VITALS
DIASTOLIC BLOOD PRESSURE: 82 MMHG | WEIGHT: 162.63 LBS | RESPIRATION RATE: 18 BRPM | TEMPERATURE: 98 F | OXYGEN SATURATION: 99 % | HEART RATE: 78 BPM | SYSTOLIC BLOOD PRESSURE: 129 MMHG | BODY MASS INDEX: 23 KG/M2

## 2018-02-08 DIAGNOSIS — C20 RECTAL CANCER (HCC): Primary | ICD-10-CM

## 2018-02-08 DIAGNOSIS — F32.9 REACTIVE DEPRESSION: ICD-10-CM

## 2018-02-08 LAB
ALBUMIN SERPL-MCNC: 3.1 G/DL (ref 3.5–4.8)
ALP LIVER SERPL-CCNC: 88 U/L (ref 45–117)
ALT SERPL-CCNC: 19 U/L (ref 17–63)
AST SERPL-CCNC: 26 U/L (ref 15–41)
BASOPHILS # BLD AUTO: 0.04 X10(3) UL (ref 0–0.1)
BASOPHILS NFR BLD AUTO: 0.7 %
BILIRUB SERPL-MCNC: 0.3 MG/DL (ref 0.1–2)
BUN BLD-MCNC: 12 MG/DL (ref 8–20)
CALCIUM BLD-MCNC: 8.9 MG/DL (ref 8.3–10.3)
CHLORIDE: 106 MMOL/L (ref 101–111)
CO2: 31 MMOL/L (ref 22–32)
CREAT BLD-MCNC: 1.14 MG/DL (ref 0.7–1.3)
EOSINOPHIL # BLD AUTO: 0.17 X10(3) UL (ref 0–0.3)
EOSINOPHIL NFR BLD AUTO: 3.1 %
ERYTHROCYTE [DISTWIDTH] IN BLOOD BY AUTOMATED COUNT: 14.2 % (ref 11.5–16)
GLUCOSE BLD-MCNC: 86 MG/DL (ref 70–99)
HAV IGM SER QL: 2 MG/DL (ref 1.7–3)
HCT VFR BLD AUTO: 39.2 % (ref 37–53)
HGB BLD-MCNC: 12.6 G/DL (ref 13–17)
IMMATURE GRANULOCYTE COUNT: 0.02 X10(3) UL (ref 0–1)
IMMATURE GRANULOCYTE RATIO %: 0.4 %
LYMPHOCYTES # BLD AUTO: 0.67 X10(3) UL (ref 0.9–4)
LYMPHOCYTES NFR BLD AUTO: 12.3 %
M PROTEIN MFR SERPL ELPH: 7.4 G/DL (ref 6.1–8.3)
MCH RBC QN AUTO: 29.6 PG (ref 27–33.2)
MCHC RBC AUTO-ENTMCNC: 32.1 G/DL (ref 31–37)
MCV RBC AUTO: 92 FL (ref 80–99)
MONOCYTES # BLD AUTO: 0.76 X10(3) UL (ref 0.1–1)
MONOCYTES NFR BLD AUTO: 13.9 %
NEUTROPHIL ABS PRELIM: 3.8 X10 (3) UL (ref 1.3–6.7)
NEUTROPHILS # BLD AUTO: 3.8 X10(3) UL (ref 1.3–6.7)
NEUTROPHILS NFR BLD AUTO: 69.6 %
PLATELET # BLD AUTO: 236 10(3)UL (ref 150–450)
POTASSIUM SERPL-SCNC: 4.2 MMOL/L (ref 3.6–5.1)
RBC # BLD AUTO: 4.26 X10(6)UL (ref 4.3–5.7)
RED CELL DISTRIBUTION WIDTH-SD: 47.5 FL (ref 35.1–46.3)
SODIUM SERPL-SCNC: 141 MMOL/L (ref 136–144)
WBC # BLD AUTO: 5.5 X10(3) UL (ref 4–13)

## 2018-02-08 PROCEDURE — 99214 OFFICE O/P EST MOD 30 MIN: CPT | Performed by: NURSE PRACTITIONER

## 2018-02-08 NOTE — PROGRESS NOTES
Pt here for follow up. Pt complains of feelings of pins and needles in his hands and feet. He states his legs gave out on him last Saturday. He complains of nausea and diarrhea. He states he has to empty his colostomy bag 2 - 3 times additional a day.

## 2018-02-09 NOTE — PROGRESS NOTES
ANP Visit Note    Patient Name: Alva Colorado   YOB: 1960   Medical Record Number: [id number]   CSN: [id number]   Date of visit: 2/9/2018       Chief Complaint/Reason for Visit: Follow-up / rectal cancer    Oncologic history : Patient is a He has had 2 days of additional volume in his colostomy bag and states that his stool was green. He started taking Imodium twice a day. He reports a metallic taste in his mouth and has no appetite. He is getting frequent throat spasms.   He suffers from Social History Narrative   None on file     Medications:    Current Outpatient Prescriptions:   •  capecitabine 500 MG Oral tab, Take 3 tablets (1,500 mg total) by mouth 2 (two) times daily. Dose:  1,000 mg/m2.   Take twice a day on days 1-14, Disp: 84 Date: 02/01/2018  Value: 15          Ref range: 8 - 20 mg/dL       Status: Final  Creatinine                                    Date: 02/01/2018  Value: 0.99        Ref range: 0.70 - 1.30 mg/dL  Status: Final  GFR Date: 02/01/2018  Value: 3.95*       Ref range: 4.30 - 5.70 x10(*  Status: Final  HGB                                           Date: 02/01/2018  Value: 11.8*       Ref range: 13.0 - 17.0 g/dL   Status: Final  HCT 02/01/2018  Value: 0.02        Ref range: 0.00 - 1.00 x10(*  Status: Final  Neutrophil %                                  Date: 02/01/2018  Value: 73.0        Ref range: %                  Status: Final  Lymphocyte %                                  Date: Up: 2/22/18 labs chemo/ MD     Risk Level: HIGH Rectal Cancer     Electronically Signed by:    Miah Crain FNP-BC  Nurse Practitioner  [facility]

## 2018-02-16 ENCOUNTER — TELEPHONE (OUTPATIENT)
Dept: HEMATOLOGY/ONCOLOGY | Facility: HOSPITAL | Age: 58
End: 2018-02-16

## 2018-02-22 ENCOUNTER — OFFICE VISIT (OUTPATIENT)
Dept: HEMATOLOGY/ONCOLOGY | Age: 58
End: 2018-02-22
Attending: INTERNAL MEDICINE
Payer: COMMERCIAL

## 2018-02-22 VITALS
TEMPERATURE: 97 F | DIASTOLIC BLOOD PRESSURE: 78 MMHG | WEIGHT: 167.19 LBS | OXYGEN SATURATION: 100 % | HEART RATE: 66 BPM | SYSTOLIC BLOOD PRESSURE: 123 MMHG | BODY MASS INDEX: 24 KG/M2 | RESPIRATION RATE: 18 BRPM

## 2018-02-22 DIAGNOSIS — Z51.11 ENCOUNTER FOR CHEMOTHERAPY MANAGEMENT: ICD-10-CM

## 2018-02-22 DIAGNOSIS — F41.9 ANXIETY: ICD-10-CM

## 2018-02-22 DIAGNOSIS — C20 RECTAL CANCER (HCC): Primary | ICD-10-CM

## 2018-02-22 LAB
ALBUMIN SERPL-MCNC: 3.2 G/DL (ref 3.5–4.8)
ALP LIVER SERPL-CCNC: 92 U/L (ref 45–117)
ALT SERPL-CCNC: 18 U/L (ref 17–63)
AST SERPL-CCNC: 25 U/L (ref 15–41)
BASOPHILS # BLD AUTO: 0.03 X10(3) UL (ref 0–0.1)
BASOPHILS NFR BLD AUTO: 0.8 %
BILIRUB SERPL-MCNC: 0.2 MG/DL (ref 0.1–2)
BUN BLD-MCNC: 12 MG/DL (ref 8–20)
CALCIUM BLD-MCNC: 8.5 MG/DL (ref 8.3–10.3)
CHLORIDE: 109 MMOL/L (ref 101–111)
CO2: 26 MMOL/L (ref 22–32)
CREAT BLD-MCNC: 1.05 MG/DL (ref 0.7–1.3)
EOSINOPHIL # BLD AUTO: 0.1 X10(3) UL (ref 0–0.3)
EOSINOPHIL NFR BLD AUTO: 2.7 %
ERYTHROCYTE [DISTWIDTH] IN BLOOD BY AUTOMATED COUNT: 16.9 % (ref 11.5–16)
GLUCOSE BLD-MCNC: 70 MG/DL (ref 70–99)
HCT VFR BLD AUTO: 38.5 % (ref 37–53)
HGB BLD-MCNC: 12.3 G/DL (ref 13–17)
IMMATURE GRANULOCYTE COUNT: 0 X10(3) UL (ref 0–1)
IMMATURE GRANULOCYTE RATIO %: 0 %
LYMPHOCYTES # BLD AUTO: 0.39 X10(3) UL (ref 0.9–4)
LYMPHOCYTES NFR BLD AUTO: 10.7 %
M PROTEIN MFR SERPL ELPH: 7.2 G/DL (ref 6.1–8.3)
MCH RBC QN AUTO: 29.6 PG (ref 27–33.2)
MCHC RBC AUTO-ENTMCNC: 31.9 G/DL (ref 31–37)
MCV RBC AUTO: 92.8 FL (ref 80–99)
MONOCYTES # BLD AUTO: 1.03 X10(3) UL (ref 0.1–1)
MONOCYTES NFR BLD AUTO: 28.3 %
NEUTROPHIL ABS PRELIM: 2.09 X10 (3) UL (ref 1.3–6.7)
NEUTROPHILS # BLD AUTO: 2.09 X10(3) UL (ref 1.3–6.7)
NEUTROPHILS NFR BLD AUTO: 57.5 %
PLATELET # BLD AUTO: 174 10(3)UL (ref 150–450)
POTASSIUM SERPL-SCNC: 3.7 MMOL/L (ref 3.6–5.1)
RBC # BLD AUTO: 4.15 X10(6)UL (ref 4.3–5.7)
RED CELL DISTRIBUTION WIDTH-SD: 49.2 FL (ref 35.1–46.3)
SODIUM SERPL-SCNC: 143 MMOL/L (ref 136–144)
WBC # BLD AUTO: 3.6 X10(3) UL (ref 4–13)

## 2018-02-22 PROCEDURE — 36415 COLL VENOUS BLD VENIPUNCTURE: CPT

## 2018-02-22 PROCEDURE — 96375 TX/PRO/DX INJ NEW DRUG ADDON: CPT

## 2018-02-22 PROCEDURE — 85025 COMPLETE CBC W/AUTO DIFF WBC: CPT

## 2018-02-22 PROCEDURE — 80053 COMPREHEN METABOLIC PANEL: CPT

## 2018-02-22 PROCEDURE — 96413 CHEMO IV INFUSION 1 HR: CPT

## 2018-02-22 PROCEDURE — 99214 OFFICE O/P EST MOD 30 MIN: CPT | Performed by: CLINICAL NURSE SPECIALIST

## 2018-02-22 PROCEDURE — 96415 CHEMO IV INFUSION ADDL HR: CPT

## 2018-02-22 RX ORDER — ONDANSETRON 2 MG/ML
8 INJECTION INTRAMUSCULAR; INTRAVENOUS EVERY 6 HOURS PRN
Status: CANCELLED | OUTPATIENT
Start: 2018-02-22

## 2018-02-22 RX ORDER — METOCLOPRAMIDE HYDROCHLORIDE 5 MG/ML
10 INJECTION INTRAMUSCULAR; INTRAVENOUS EVERY 6 HOURS PRN
Status: CANCELLED | OUTPATIENT
Start: 2018-02-22

## 2018-02-22 RX ORDER — LORAZEPAM 0.5 MG/1
TABLET ORAL EVERY 4 HOURS PRN
Status: CANCELLED | OUTPATIENT
Start: 2018-02-22

## 2018-02-22 RX ORDER — PROCHLORPERAZINE MALEATE 10 MG
10 TABLET ORAL EVERY 6 HOURS PRN
Status: CANCELLED | OUTPATIENT
Start: 2018-02-22

## 2018-02-22 RX ORDER — LORAZEPAM 2 MG/ML
INJECTION INTRAMUSCULAR EVERY 4 HOURS PRN
Status: CANCELLED | OUTPATIENT
Start: 2018-02-22

## 2018-02-22 NOTE — PROGRESS NOTES
Pt here for 2 week MD jiménez/sam and due for chemo. Pt continues on Xeloda 1800mg BID for 2 weeks on 1 week off. Energy level is good, appetite is fair, denies pain. Pt has no bowel issues. Pt c/o neuropathy in hands and feet, and cold sensitivity for 1 week.  Pt

## 2018-02-22 NOTE — PROGRESS NOTES
Education Record    Learner:  Patient    Disease / Akash FLANAGAN    Barriers / Limitations:  None   Comments:    Method:  Discussion   Comments:    General Topics:  Infection, Medication, Precautions, Procedure, Side effects and symptom management, P

## 2018-02-22 NOTE — PROGRESS NOTES
ANP Visit Note    Patient Name: Jennifer Pryor   YOB: 1960   Medical Record Number: [id number]   CSN: [id number]   Date of visit: 2/22/2018       Chief Complaint/Reason for Visit:  Patient presents with:   Follow - Up: pt here for 2 week MD jiménez her PCP and it was suggested to her as Ayala Huitron has colon cancer, mother, sister, and brother with cancer.      Problem List:  Patient Active Problem List:     Other malignant neoplasm of skin, site unspecified     Unspecified asthma(493.90)     Rectal cancer Disp: 84 tablet, Rfl: 4  •  capecitabine 150 MG Oral tab, Take 2 tablets (300 mg total) by mouth 2 (two) times daily. Dose:  1,000 mg/m2.   Take twice a day on days 1-14, Disp: 56 tablet, Rfl: 4  •  Loperamide HCl 1 MG/7.5ML Oral Liquid, Take 2 mg by mouth 1.30 mg/dL 1.05   CALCIUM Latest Ref Range: 8.3 - 10.3 mg/dL 8.5   GFR, African-American Latest Ref Range: >=60  89   GFR, Non-African American Latest Ref Range: >=60  77   ALKALINE PHOSPHATASE Latest Ref Range: 45 - 117 U/L 92   AST (SGOT) Latest Ref Shantel Marques with this plan, he was hoping it could be done on the blood draw from today, explained genetic process. Emotional Well Being:  I have assessed the patient's emotional well-being and any concerns about anxiety or depression.   We discussed issues of dist

## 2018-03-12 ENCOUNTER — OFFICE VISIT (OUTPATIENT)
Dept: HEMATOLOGY/ONCOLOGY | Age: 58
End: 2018-03-12
Attending: INTERNAL MEDICINE
Payer: COMMERCIAL

## 2018-03-12 ENCOUNTER — TELEPHONE (OUTPATIENT)
Dept: HEMATOLOGY/ONCOLOGY | Facility: HOSPITAL | Age: 58
End: 2018-03-12

## 2018-03-12 VITALS
SYSTOLIC BLOOD PRESSURE: 116 MMHG | OXYGEN SATURATION: 99 % | DIASTOLIC BLOOD PRESSURE: 81 MMHG | HEART RATE: 90 BPM | BODY MASS INDEX: 22 KG/M2 | RESPIRATION RATE: 18 BRPM | WEIGHT: 153.81 LBS | TEMPERATURE: 97 F

## 2018-03-12 DIAGNOSIS — R53.1 WEAKNESS GENERALIZED: ICD-10-CM

## 2018-03-12 DIAGNOSIS — T45.1X5A CHEMOTHERAPY INDUCED DIARRHEA: Primary | ICD-10-CM

## 2018-03-12 DIAGNOSIS — C20 RECTAL CANCER (HCC): Primary | ICD-10-CM

## 2018-03-12 DIAGNOSIS — R19.7 DIARRHEA WITH DEHYDRATION: ICD-10-CM

## 2018-03-12 DIAGNOSIS — K52.1 CHEMOTHERAPY INDUCED DIARRHEA: Primary | ICD-10-CM

## 2018-03-12 DIAGNOSIS — R19.7 DIARRHEA, UNSPECIFIED TYPE: ICD-10-CM

## 2018-03-12 DIAGNOSIS — Z51.11 ENCOUNTER FOR CHEMOTHERAPY MANAGEMENT: ICD-10-CM

## 2018-03-12 DIAGNOSIS — R05.9 COUGH: ICD-10-CM

## 2018-03-12 LAB
ALBUMIN SERPL-MCNC: 3.2 G/DL (ref 3.5–4.8)
ALP LIVER SERPL-CCNC: 93 U/L (ref 45–117)
ALT SERPL-CCNC: 26 U/L (ref 17–63)
AST SERPL-CCNC: 31 U/L (ref 15–41)
BILIRUB SERPL-MCNC: 0.3 MG/DL (ref 0.1–2)
BUN BLD-MCNC: 18 MG/DL (ref 8–20)
CALCIUM BLD-MCNC: 8.6 MG/DL (ref 8.3–10.3)
CHLORIDE: 104 MMOL/L (ref 101–111)
CO2: 26 MMOL/L (ref 22–32)
CREAT BLD-MCNC: 1.27 MG/DL (ref 0.7–1.3)
GLUCOSE BLD-MCNC: 107 MG/DL (ref 70–99)
HAV IGM SER QL: 1.7 MG/DL (ref 1.7–3)
M PROTEIN MFR SERPL ELPH: 7.3 G/DL (ref 6.1–8.3)
POTASSIUM SERPL-SCNC: 3.4 MMOL/L (ref 3.6–5.1)
SODIUM SERPL-SCNC: 138 MMOL/L (ref 136–144)

## 2018-03-12 PROCEDURE — 99214 OFFICE O/P EST MOD 30 MIN: CPT | Performed by: NURSE PRACTITIONER

## 2018-03-12 PROCEDURE — 96360 HYDRATION IV INFUSION INIT: CPT

## 2018-03-12 RX ORDER — DIPHENOXYLATE HYDROCHLORIDE AND ATROPINE SULFATE 2.5; .025 MG/1; MG/1
1-2 TABLET ORAL 4 TIMES DAILY PRN
Qty: 30 TABLET | Refills: 0 | Status: SHIPPED | OUTPATIENT
Start: 2018-03-12 | End: 2018-08-02

## 2018-03-12 RX ORDER — POTASSIUM CHLORIDE 750 MG/1
20 TABLET, EXTENDED RELEASE ORAL ONCE
Status: COMPLETED | OUTPATIENT
Start: 2018-03-12 | End: 2018-03-12

## 2018-03-12 RX ORDER — AZITHROMYCIN 250 MG/1
250 TABLET, FILM COATED ORAL DAILY
Qty: 1 PACKAGE | Refills: 0 | Status: SHIPPED | OUTPATIENT
Start: 2018-03-12 | End: 2018-03-15

## 2018-03-12 RX ADMIN — POTASSIUM CHLORIDE 20 MEQ: 750 TABLET, EXTENDED RELEASE ORAL at 13:00:00

## 2018-03-12 NOTE — TELEPHONE ENCOUNTER
Patient calling with diarrhea for 3 weeks. States taking imodium once a day with no relief. Has not call the office prior. \"Dry uncomfortable\" mouth. Emesis yesterday. Increased fatigue. Afebrile.   OK per Valarie SCOTT to add onto her schedule for asse

## 2018-03-12 NOTE — PROGRESS NOTES
ANP Visit Note    Patient Name: Aurora Gale   YOB: 1960   Medical Record Number: [id number]   CSN: [id number]   Date of visit: 3/12/2018     Chief Complaint/Reason for Visit: Follow-up /Rectal cancer     Oncologic history : Patient is a 11 his hands, He has been trying to avoid the cold. Patient reports he is unable to eat and drink and has a productive cough and sore throat. He denies fever. Patient states he had an episode of vomiting this am and is dizzy when standing.   No other new com Prescriptions:   •  capecitabine 500 MG Oral tab, Take 3 tablets (1,500 mg total) by mouth 2 (two) times daily. Dose:  1,000 mg/m2.   Take twice a day on days 1-14, Disp: 84 tablet, Rfl: 4  •  capecitabine 150 MG Oral tab, Take 2 tablets (300 mg total) by m Date: 02/22/2018  Value: 12          Ref range: 8 - 20 mg/dL       Status: Final  Creatinine                                    Date: 02/22/2018  Value: 1.05        Ref range: 0.70 - 1.30 mg/dL  Status: Final  GFR, Non- Ref range: 4.0 - 13.0 x10(3*  Status: Final  RBC                                           Date: 02/22/2018  Value: 4.15*       Ref range: 4.30 - 5.70 x10(*  Status: Final  HGB                                           Date: 02/22/2018  Value: 12.3* x10(*  Status: Final  Immature Granulocyte Absolute                 Date: 02/22/2018  Value: 0.00        Ref range: 0.00 - 1.00 x10(*  Status: Final  Neutrophil %                                  Date: 02/22/2018  Value: 57.5        Ref range: % [facility]

## 2018-03-12 NOTE — PROGRESS NOTES
Education Record  Learner:  Patient  Disease / Diagnosis:   Rectal cancer; diarrhea/dehydration  Barriers / Limitations:  None  Method:  Printed material and Reinforcement  General Topics:  Plan of care reviewed; xeloda; anti-diarrhea meds.   Outcome:  Show

## 2018-03-12 NOTE — PATIENT INSTRUCTIONS
Do not take your Xeloda until after you see Dr Kristen Duong on Thursday and he instructs you when to start. You may take both Imodium and Lomotil for diarrhea. Alternate them one every 4 hours while awake.     Gatorade is a good fluid to drink to help replace

## 2018-03-15 ENCOUNTER — APPOINTMENT (OUTPATIENT)
Dept: HEMATOLOGY/ONCOLOGY | Age: 58
End: 2018-03-15
Attending: INTERNAL MEDICINE
Payer: COMMERCIAL

## 2018-03-15 ENCOUNTER — OFFICE VISIT (OUTPATIENT)
Dept: HEMATOLOGY/ONCOLOGY | Age: 58
End: 2018-03-15
Attending: INTERNAL MEDICINE
Payer: COMMERCIAL

## 2018-03-15 VITALS
DIASTOLIC BLOOD PRESSURE: 81 MMHG | WEIGHT: 151.81 LBS | OXYGEN SATURATION: 99 % | TEMPERATURE: 97 F | SYSTOLIC BLOOD PRESSURE: 133 MMHG | HEART RATE: 83 BPM | RESPIRATION RATE: 18 BRPM | BODY MASS INDEX: 22 KG/M2

## 2018-03-15 DIAGNOSIS — G62.0 PERIPHERAL NEUROPATHY DUE TO CHEMOTHERAPY (HCC): ICD-10-CM

## 2018-03-15 DIAGNOSIS — K52.1 CHEMOTHERAPY-INDUCED ENTERITIS: Primary | ICD-10-CM

## 2018-03-15 DIAGNOSIS — T45.1X5A PERIPHERAL NEUROPATHY DUE TO CHEMOTHERAPY (HCC): ICD-10-CM

## 2018-03-15 DIAGNOSIS — T45.1X5A CHEMOTHERAPY-INDUCED ENTERITIS: Primary | ICD-10-CM

## 2018-03-15 DIAGNOSIS — C20 RECTAL CANCER (HCC): ICD-10-CM

## 2018-03-15 LAB
ALBUMIN SERPL-MCNC: 3.2 G/DL (ref 3.5–4.8)
ALP LIVER SERPL-CCNC: 101 U/L (ref 45–117)
ALT SERPL-CCNC: 28 U/L (ref 17–63)
AST SERPL-CCNC: 29 U/L (ref 15–41)
BASOPHILS # BLD AUTO: 0.02 X10(3) UL (ref 0–0.1)
BASOPHILS NFR BLD AUTO: 0.5 %
BILIRUB SERPL-MCNC: 0.4 MG/DL (ref 0.1–2)
BUN BLD-MCNC: 17 MG/DL (ref 8–20)
CALCIUM BLD-MCNC: 9 MG/DL (ref 8.3–10.3)
CHLORIDE: 107 MMOL/L (ref 101–111)
CO2: 24 MMOL/L (ref 22–32)
CREAT BLD-MCNC: 1.19 MG/DL (ref 0.7–1.3)
EOSINOPHIL # BLD AUTO: 0.18 X10(3) UL (ref 0–0.3)
EOSINOPHIL NFR BLD AUTO: 4.3 %
ERYTHROCYTE [DISTWIDTH] IN BLOOD BY AUTOMATED COUNT: 19.9 % (ref 11.5–16)
GLUCOSE BLD-MCNC: 77 MG/DL (ref 70–99)
HCT VFR BLD AUTO: 40.8 % (ref 37–53)
HGB BLD-MCNC: 13.7 G/DL (ref 13–17)
IMMATURE GRANULOCYTE COUNT: 0.01 X10(3) UL (ref 0–1)
IMMATURE GRANULOCYTE RATIO %: 0.2 %
LYMPHOCYTES # BLD AUTO: 0.66 X10(3) UL (ref 0.9–4)
LYMPHOCYTES NFR BLD AUTO: 15.7 %
M PROTEIN MFR SERPL ELPH: 7.5 G/DL (ref 6.1–8.3)
MCH RBC QN AUTO: 30 PG (ref 27–33.2)
MCHC RBC AUTO-ENTMCNC: 33.6 G/DL (ref 31–37)
MCV RBC AUTO: 89.3 FL (ref 80–99)
MONOCYTES # BLD AUTO: 0.87 X10(3) UL (ref 0.1–1)
MONOCYTES NFR BLD AUTO: 20.7 %
NEUTROPHIL ABS PRELIM: 2.47 X10 (3) UL (ref 1.3–6.7)
NEUTROPHILS # BLD AUTO: 2.47 X10(3) UL (ref 1.3–6.7)
NEUTROPHILS NFR BLD AUTO: 58.6 %
PLATELET # BLD AUTO: 215 10(3)UL (ref 150–450)
POTASSIUM SERPL-SCNC: 3.3 MMOL/L (ref 3.6–5.1)
RBC # BLD AUTO: 4.57 X10(6)UL (ref 4.3–5.7)
RED CELL DISTRIBUTION WIDTH-SD: 59 FL (ref 35.1–46.3)
SODIUM SERPL-SCNC: 140 MMOL/L (ref 136–144)
WBC # BLD AUTO: 4.2 X10(3) UL (ref 4–13)

## 2018-03-15 PROCEDURE — 99214 OFFICE O/P EST MOD 30 MIN: CPT | Performed by: INTERNAL MEDICINE

## 2018-03-15 NOTE — PROGRESS NOTES
Pt here for 3 week MD jiménez/sam and due for chemo. Pt notes diarrhea improved a little since yesterday, still having 8 episodes of diarrhea though, only using Imodium and Lomotil once daily. Energy level is very low.  Notes having cold symptoms for 3 weeks now, h

## 2018-03-16 NOTE — PROGRESS NOTES
[facility]    PATIENT'S NAME: Merline Jazmyn   ATTENDING PHYSICIAN: Ofelia Ahn M.D.    PATIENT ACCOUNT #: [de-identified] LOCATION: [address] [place] [address] RECORD #: [id number] YOB: 1960   DATE OF SERVICE: 03/15/2018       CANCER ARNIE EXAMINATION:    GENERAL:  He is a well-developed, thin male in no acute distress. VITAL SIGNS:  His performance status today is 1-2. His weight is 151 pounds which is down 16 pounds from 3 weeks ago.   Blood pressure 133/81, pulse 83, respiratory rate 20, Mason Shaw M.D.  d: 03/15/2018 16:59:47  t: 03/15/2018 19:12:25  [facility] [id number]  /    cc: Dr. Jimmy Alejandra M.D. Alphonse Quintero DO

## 2018-03-19 ENCOUNTER — MED REC SCAN ONLY (OUTPATIENT)
Dept: FAMILY MEDICINE CLINIC | Facility: CLINIC | Age: 58
End: 2018-03-19

## 2018-03-22 ENCOUNTER — NURSE ONLY (OUTPATIENT)
Dept: HEMATOLOGY/ONCOLOGY | Age: 58
End: 2018-03-22
Attending: INTERNAL MEDICINE
Payer: COMMERCIAL

## 2018-03-22 ENCOUNTER — OFFICE VISIT (OUTPATIENT)
Dept: HEMATOLOGY/ONCOLOGY | Age: 58
End: 2018-03-22
Attending: INTERNAL MEDICINE
Payer: COMMERCIAL

## 2018-03-22 ENCOUNTER — SOCIAL WORK SERVICES (OUTPATIENT)
Dept: HEMATOLOGY/ONCOLOGY | Facility: HOSPITAL | Age: 58
End: 2018-03-22

## 2018-03-22 VITALS
HEART RATE: 69 BPM | TEMPERATURE: 97 F | SYSTOLIC BLOOD PRESSURE: 130 MMHG | WEIGHT: 161.38 LBS | OXYGEN SATURATION: 98 % | DIASTOLIC BLOOD PRESSURE: 77 MMHG | RESPIRATION RATE: 18 BRPM | BODY MASS INDEX: 23 KG/M2

## 2018-03-22 DIAGNOSIS — C20 RECTAL CANCER (HCC): Primary | ICD-10-CM

## 2018-03-22 DIAGNOSIS — K52.1 CHEMOTHERAPY-INDUCED ENTERITIS: ICD-10-CM

## 2018-03-22 DIAGNOSIS — G62.0 PERIPHERAL NEUROPATHY DUE TO CHEMOTHERAPY (HCC): ICD-10-CM

## 2018-03-22 DIAGNOSIS — T45.1X5A CHEMOTHERAPY-INDUCED ENTERITIS: ICD-10-CM

## 2018-03-22 DIAGNOSIS — T45.1X5A PERIPHERAL NEUROPATHY DUE TO CHEMOTHERAPY (HCC): ICD-10-CM

## 2018-03-22 LAB
ALBUMIN SERPL-MCNC: 3 G/DL (ref 3.5–4.8)
ALP LIVER SERPL-CCNC: 93 U/L (ref 45–117)
ALT SERPL-CCNC: 24 U/L (ref 17–63)
AST SERPL-CCNC: 29 U/L (ref 15–41)
BASOPHILS # BLD AUTO: 0.03 X10(3) UL (ref 0–0.1)
BASOPHILS NFR BLD AUTO: 0.8 %
BILIRUB SERPL-MCNC: 0.2 MG/DL (ref 0.1–2)
BUN BLD-MCNC: 10 MG/DL (ref 8–20)
CALCIUM BLD-MCNC: 8.7 MG/DL (ref 8.3–10.3)
CHLORIDE: 108 MMOL/L (ref 101–111)
CO2: 27 MMOL/L (ref 22–32)
CREAT BLD-MCNC: 1.16 MG/DL (ref 0.7–1.3)
EOSINOPHIL # BLD AUTO: 0.41 X10(3) UL (ref 0–0.3)
EOSINOPHIL NFR BLD AUTO: 10.6 %
ERYTHROCYTE [DISTWIDTH] IN BLOOD BY AUTOMATED COUNT: 20.7 % (ref 11.5–16)
GLUCOSE BLD-MCNC: 78 MG/DL (ref 70–99)
HCT VFR BLD AUTO: 39.3 % (ref 37–53)
HGB BLD-MCNC: 13.3 G/DL (ref 13–17)
IMMATURE GRANULOCYTE COUNT: 0.02 X10(3) UL (ref 0–1)
IMMATURE GRANULOCYTE RATIO %: 0.5 %
LYMPHOCYTES # BLD AUTO: 0.49 X10(3) UL (ref 0.9–4)
LYMPHOCYTES NFR BLD AUTO: 12.7 %
M PROTEIN MFR SERPL ELPH: 6.9 G/DL (ref 6.1–8.3)
MCH RBC QN AUTO: 30.9 PG (ref 27–33.2)
MCHC RBC AUTO-ENTMCNC: 33.8 G/DL (ref 31–37)
MCV RBC AUTO: 91.4 FL (ref 80–99)
MONOCYTES # BLD AUTO: 0.72 X10(3) UL (ref 0.1–1)
MONOCYTES NFR BLD AUTO: 18.7 %
NEUTROPHIL ABS PRELIM: 2.19 X10 (3) UL (ref 1.3–6.7)
NEUTROPHILS # BLD AUTO: 2.19 X10(3) UL (ref 1.3–6.7)
NEUTROPHILS NFR BLD AUTO: 56.7 %
PLATELET # BLD AUTO: 169 10(3)UL (ref 150–450)
PLATELET MORPHOLOGY: NORMAL
POTASSIUM SERPL-SCNC: 3.8 MMOL/L (ref 3.6–5.1)
RBC # BLD AUTO: 4.3 X10(6)UL (ref 4.3–5.7)
RED CELL DISTRIBUTION WIDTH-SD: 68.1 FL (ref 35.1–46.3)
SODIUM SERPL-SCNC: 142 MMOL/L (ref 136–144)
WBC # BLD AUTO: 3.9 X10(3) UL (ref 4–13)

## 2018-03-22 PROCEDURE — 96375 TX/PRO/DX INJ NEW DRUG ADDON: CPT

## 2018-03-22 PROCEDURE — 96415 CHEMO IV INFUSION ADDL HR: CPT

## 2018-03-22 PROCEDURE — 96413 CHEMO IV INFUSION 1 HR: CPT

## 2018-03-22 PROCEDURE — 99214 OFFICE O/P EST MOD 30 MIN: CPT | Performed by: INTERNAL MEDICINE

## 2018-03-22 RX ORDER — PROCHLORPERAZINE MALEATE 10 MG
10 TABLET ORAL EVERY 6 HOURS PRN
Status: CANCELLED | OUTPATIENT
Start: 2018-03-22

## 2018-03-22 RX ORDER — ONDANSETRON 2 MG/ML
8 INJECTION INTRAMUSCULAR; INTRAVENOUS EVERY 6 HOURS PRN
Status: CANCELLED | OUTPATIENT
Start: 2018-03-22

## 2018-03-22 RX ORDER — LORAZEPAM 0.5 MG/1
TABLET ORAL EVERY 4 HOURS PRN
Status: CANCELLED | OUTPATIENT
Start: 2018-03-22

## 2018-03-22 RX ORDER — LORAZEPAM 2 MG/ML
INJECTION INTRAMUSCULAR EVERY 4 HOURS PRN
Status: CANCELLED | OUTPATIENT
Start: 2018-03-22

## 2018-03-22 RX ORDER — METOCLOPRAMIDE HYDROCHLORIDE 5 MG/ML
10 INJECTION INTRAMUSCULAR; INTRAVENOUS EVERY 6 HOURS PRN
Status: CANCELLED | OUTPATIENT
Start: 2018-03-22

## 2018-03-22 NOTE — PROGRESS NOTES
Nutrition Consultation    Patient Name: Michelet Lawson  YOB: 1960  Medical Record Number: [id number]   Account Number: [de-identified]  Dietitian: Maximino An RD    Date of visit: 3/22/2018    Diet Rx: low residue, high protein    Bambi review and discussion w/ oncologist, noted pt w/ c/o diarrhea and wt loss. Pt wt appears up 10 lbs x 1 wk. Noted oncologist recommended better control of stools w/ imodium and lomotil at last visit. RD continued available pending MD discretion.

## 2018-03-22 NOTE — PATIENT INSTRUCTIONS
To reach Dr Aminata green during business hours, please call [phone]. After hours, including weekends, evenings, and holidays, please call the main number [phone] for emergent needs.

## 2018-03-22 NOTE — PROGRESS NOTES
MARY met with patient who has two more chemo treatments. Patient states he felt terrible and had a week off and now feels much better. Patient states he is working and he has moved to another house in January, which he said was very stressful.   Patient is

## 2018-03-22 NOTE — PROGRESS NOTES
Education Record    Learner:  Patient    Disease / Diagnosis:rectal cancer    Barriers / Limitations:  None    Method:  Brief focused, printed material and  reinforcement    General Topics:  Plan of care reviewed    Outcome:  Shows understanding

## 2018-03-22 NOTE — PROGRESS NOTES
Pt here for 1 week MD f/u. Pt feeling better than last week. Energy level and appetite has been improving. Denies pain. Diarrhea is gone, no nausea.

## 2018-03-27 NOTE — PROGRESS NOTES
[facility]    PATIENT'S NAME: Tomaskevin Nuviajose elias   ATTENDING PHYSICIAN: LESLIE Reyna    PATIENT ACCOUNT #: [de-identified] LOCATION: [address] [place] [address] RECORD #: [id number] YOB: 1960   DATE OF SERVICE: 03/22/2018       CANCER CENTER P pressure 130/77, pulse 69, respiratory rate 20, temperature 97.1. HEENT:  Unremarkable. He has pink conjunctivae, anicteric sclerae. Pharynx is without lesions. LYMPHATICS:  No cervical, supraclavicular, or axillary adenopathy.   LUNGS:  Resonant to per

## 2018-04-12 ENCOUNTER — NURSE ONLY (OUTPATIENT)
Dept: HEMATOLOGY/ONCOLOGY | Age: 58
End: 2018-04-12
Attending: INTERNAL MEDICINE
Payer: COMMERCIAL

## 2018-04-12 ENCOUNTER — OFFICE VISIT (OUTPATIENT)
Dept: HEMATOLOGY/ONCOLOGY | Age: 58
End: 2018-04-12
Attending: INTERNAL MEDICINE
Payer: COMMERCIAL

## 2018-04-12 VITALS
OXYGEN SATURATION: 99 % | TEMPERATURE: 98 F | BODY MASS INDEX: 23 KG/M2 | WEIGHT: 161.63 LBS | RESPIRATION RATE: 18 BRPM | SYSTOLIC BLOOD PRESSURE: 114 MMHG | DIASTOLIC BLOOD PRESSURE: 65 MMHG | HEART RATE: 93 BPM

## 2018-04-12 DIAGNOSIS — K52.1 CHEMOTHERAPY-INDUCED ENTERITIS: ICD-10-CM

## 2018-04-12 DIAGNOSIS — T45.1X5A CHEMOTHERAPY-INDUCED ENTERITIS: ICD-10-CM

## 2018-04-12 DIAGNOSIS — C20 RECTAL CANCER (HCC): Primary | ICD-10-CM

## 2018-04-12 PROCEDURE — 96415 CHEMO IV INFUSION ADDL HR: CPT

## 2018-04-12 PROCEDURE — 96375 TX/PRO/DX INJ NEW DRUG ADDON: CPT

## 2018-04-12 PROCEDURE — 96413 CHEMO IV INFUSION 1 HR: CPT

## 2018-04-12 PROCEDURE — 99214 OFFICE O/P EST MOD 30 MIN: CPT | Performed by: INTERNAL MEDICINE

## 2018-04-12 RX ORDER — PROCHLORPERAZINE MALEATE 10 MG
10 TABLET ORAL EVERY 6 HOURS PRN
Status: CANCELLED | OUTPATIENT
Start: 2018-04-12

## 2018-04-12 RX ORDER — METOCLOPRAMIDE HYDROCHLORIDE 5 MG/ML
10 INJECTION INTRAMUSCULAR; INTRAVENOUS EVERY 6 HOURS PRN
Status: CANCELLED | OUTPATIENT
Start: 2018-04-12

## 2018-04-12 RX ORDER — ONDANSETRON 2 MG/ML
8 INJECTION INTRAMUSCULAR; INTRAVENOUS EVERY 6 HOURS PRN
Status: CANCELLED | OUTPATIENT
Start: 2018-04-12

## 2018-04-12 RX ORDER — LORAZEPAM 2 MG/ML
INJECTION INTRAMUSCULAR EVERY 4 HOURS PRN
Status: CANCELLED | OUTPATIENT
Start: 2018-04-12

## 2018-04-12 RX ORDER — LORAZEPAM 0.5 MG/1
TABLET ORAL EVERY 4 HOURS PRN
Status: CANCELLED | OUTPATIENT
Start: 2018-04-12

## 2018-04-12 NOTE — PROGRESS NOTES
Education Record    Learner:  Patient    Disease / Diagnosis:rectal cancer    Barriers / Limitations:  None    Method:  Brief focused, printed material and  reinforcement    General Topics:  Plan of care reviewed. Per Dr Jude Matta.  Pt to call us for F/U one m

## 2018-04-12 NOTE — PROGRESS NOTES
Nutrition F/U Note     Patient Name: Gavino Schaefer  YOB: 1960  Medical Record Number: [id number]      Account Number: [de-identified]  Dietitian: Mary Jo Li RD     Date of visit: 4/12/2018     Diet Rx: low residue, high protein     Pertinent kind of bummed as I was told I need additional chemo. I just want this to be over. \" Pt noted he's been taking his anti-diarrheals as rx'd and is having been control but states, \"Everything I like to eat I can't because of the fiber. \" Pt wt is stable x 3

## 2018-04-12 NOTE — PATIENT INSTRUCTIONS
To reach Dr Nancee Cabot nurse during business hours, please call [phone]. After hours, including weekends, evenings, and holidays, please call the main number [phone] for emergent needs.     Call us for a follow up appointment one month following yo

## 2018-04-12 NOTE — PROGRESS NOTES
Pt here for 3 week MD f/u and due for chemo. Energy level and appetite has been better. Denies pain. No bowel issues or nausea. Pt has no further complaints.

## 2018-04-25 ENCOUNTER — HOSPITAL ENCOUNTER (OUTPATIENT)
Dept: RADIATION ONCOLOGY | Age: 58
Discharge: HOME | End: 2018-04-25
Attending: RADIOLOGY
Payer: COMMERCIAL

## 2018-04-25 VITALS
WEIGHT: 161 LBS | SYSTOLIC BLOOD PRESSURE: 108 MMHG | DIASTOLIC BLOOD PRESSURE: 74 MMHG | BODY MASS INDEX: 23 KG/M2 | HEART RATE: 80 BPM

## 2018-04-25 DIAGNOSIS — C20 RECTAL CANCER (HCC): Primary | ICD-10-CM

## 2018-04-25 PROCEDURE — 99213 OFFICE O/P EST LOW 20 MIN: CPT

## 2018-04-25 NOTE — PROGRESS NOTES
Nursing Follow-Up Note    Patient: Lucia Billy  YOB: 1960  Age: 59year old  Radiation Oncologist: Dr. Stuart Quiles  Referring Physician: No ref. provider found  Chief Complaint: Patient presents with:   Follow - Up  Rectal Cancer    Shadi

## 2018-04-25 NOTE — PROGRESS NOTES
[facility] RADIATION ONCOLOGY FOLLOW UP    PATIENT:   Margery Leventhal      2/11/1960    DIAGNOSIS:   Rectal cancer      CANCER HISTORY:  78-year-old man with family history of rectal cancer in his mother.   Presented with rectal bleeding and lower G Location: Left arm   Patient Position: Sitting   Cuff Size: adult   Pulse: 80   Weight: 73 kg (161 lb)   Well-appearing, no acute distress. No supraclavicular adenopathy. Heart shows regular rate and rhythm. Abdomen soft and nontender.      IMPRESSION:

## 2018-04-25 NOTE — PATIENT INSTRUCTIONS
- CALL [phone] FOR A FOLLOW-UP WITH DR. HATCH IN 6 MONTHS  - FOLLOW-UP WITH DR MCALLISTER AS PLANNED  -FOLLOW UP WITH DR Jose Valenzuela FOR SURGERY AS SCHEDULED 5/25   - CALL IF YOU HAVE ANY QUESTIONS/CONCERNS REGARDING RADIATION THERAPY [phone]

## 2018-04-26 ENCOUNTER — OFFICE VISIT (OUTPATIENT)
Dept: FAMILY MEDICINE CLINIC | Facility: CLINIC | Age: 58
End: 2018-04-26

## 2018-04-26 ENCOUNTER — APPOINTMENT (OUTPATIENT)
Dept: LAB | Age: 58
End: 2018-04-26
Attending: FAMILY MEDICINE
Payer: COMMERCIAL

## 2018-04-26 VITALS
WEIGHT: 159 LBS | RESPIRATION RATE: 14 BRPM | TEMPERATURE: 97 F | DIASTOLIC BLOOD PRESSURE: 68 MMHG | BODY MASS INDEX: 23 KG/M2 | SYSTOLIC BLOOD PRESSURE: 108 MMHG

## 2018-04-26 DIAGNOSIS — Z12.5 PROSTATE CANCER SCREENING: ICD-10-CM

## 2018-04-26 DIAGNOSIS — Z00.00 ROUTINE GENERAL MEDICAL EXAMINATION AT A HEALTH CARE FACILITY: Primary | ICD-10-CM

## 2018-04-26 DIAGNOSIS — Z00.00 ROUTINE GENERAL MEDICAL EXAMINATION AT A HEALTH CARE FACILITY: ICD-10-CM

## 2018-04-26 DIAGNOSIS — C20 RECTAL CANCER (HCC): ICD-10-CM

## 2018-04-26 PROCEDURE — 36415 COLL VENOUS BLD VENIPUNCTURE: CPT

## 2018-04-26 PROCEDURE — 80061 LIPID PANEL: CPT

## 2018-04-26 PROCEDURE — 82306 VITAMIN D 25 HYDROXY: CPT

## 2018-04-26 PROCEDURE — 99396 PREV VISIT EST AGE 40-64: CPT | Performed by: FAMILY MEDICINE

## 2018-04-26 PROCEDURE — 81003 URINALYSIS AUTO W/O SCOPE: CPT

## 2018-04-26 PROCEDURE — 84443 ASSAY THYROID STIM HORMONE: CPT

## 2018-04-26 NOTE — PROGRESS NOTES
Auorra Gale is a 59year old male who presents for a complete physical exam.   HPI:   Pt complains of nothing today. Patient was diagnosed recently with rectal cancer. He has been through chemotherapy and has an ostomy bag.   He will be going next mo 37.0 - 53.0 %   .0 150.0 - 450.0 10(3)uL   MCV 92.4 80.0 - 99.0 fL   MCH 31.4 27.0 - 33.2 pg   MCHC 34.0 31.0 - 37.0 g/dL   RDW 23.4 (H) 11.5 - 16.0 %   RDW-SD 79.1 (H) 35.1 - 46.3 fL   Neutrophil Absolute Prelim 1.67 1.30 - 6.70 x10 (3) uL   Neutro Diabetes Brother    • Cancer Sister      melanoma      Social History:  Smoking status: Never Smoker                                                              Smokeless tobacco: Never Used                      Alcohol use:  No               Comment: one nerves are intact,motor and sensory are grossly intact; 2 + knee reflexes bilaterally  VASCULAR: 2 + dorsalis pedal pulses bilaterally    ASSESSMENT AND PLAN:   Miguel Pallas is a 59year old male who presents for a complete physical exam.    Pt's weigh

## 2018-05-01 NOTE — PROGRESS NOTES
[facility]    PATIENT'S NAME: Felipa Dorman   ATTENDING PHYSICIAN: Carleene Essex, A.P.N.    PATIENT ACCOUNT #: [de-identified] LOCATION: [address] [place] [address] RECORD #: [id number] YOB: 1960   DATE OF SERVICE: 04/12/2018       CANCER CENTER P axillary adenopathy. LUNGS:  Resonant to percussion and clear to auscultation with no wheezing, rales, or rhonchi. HEART:  Normal.  ABDOMEN:  No hepatosplenomegaly or tenderness. EXTREMITIES:  No clubbing, cyanosis, or edema.   His hands and feet do not

## 2018-05-07 ENCOUNTER — TELEPHONE (OUTPATIENT)
Dept: FAMILY MEDICINE CLINIC | Facility: CLINIC | Age: 58
End: 2018-05-07

## 2018-05-07 NOTE — TELEPHONE ENCOUNTER
Pt called and left a vmm w/Medical Records requesting for his recent physical to be used for an upcoming pre-op; per pt's vmm. Pls advise, pt @ [phone]. Thank you.

## 2018-05-07 NOTE — TELEPHONE ENCOUNTER
Pt was notified Dr. Alejandro Betts can clear him from the 04/26/18 physical appt. Pt was instructed to call the surgeons office and have them fax over the information re: the surgery and what is needed for clearance and pre op testing.  Pt agreed to plan and verbaliz

## 2018-05-07 NOTE — TELEPHONE ENCOUNTER
Pt had a Physical Exam on 04/26/18. He is having surgery on 05/25/18. Are you OK with clearing him for surgery off the physical  Visit from 04/26/18 or does pt need H&P?

## 2018-05-07 NOTE — TELEPHONE ENCOUNTER
I can clear him for surgery based on my H&P on 4/26/18. Please let me know what surgery is asking for in regards to H&P and labs.

## 2018-05-14 NOTE — TELEPHONE ENCOUNTER
Incoming fax received [facility]. H&P request for patient upcoming appointment with Dr. Richard Chambers 5/25/2018. Type of Procedure not noted, labs/test not noted. Outgoing call to [address] [place] [address], left message to call back.   Message fa

## 2018-05-15 NOTE — PROGRESS NOTES
Based on recent physical exam on April 28, 2018 and stable labs, patient is medically clear for ileostomy reversal with Dr. Collette Valencia. If you have any questions or concerns, please feel free to call my office. Thank you.

## 2018-05-15 NOTE — TELEPHONE ENCOUNTER
Received call from Rach Valdivia RN. Ricarda Celis stated that the pt does not need any testing, just the H&P. Asked Ricarda Celis if she could please fax an updated copy of letter that states what surgery the pt will be having.   Erica asked \" Can't you just take t

## 2018-05-23 ENCOUNTER — NURSE ONLY (OUTPATIENT)
Dept: HEMATOLOGY/ONCOLOGY | Age: 58
End: 2018-05-23
Attending: INTERNAL MEDICINE
Payer: COMMERCIAL

## 2018-05-23 ENCOUNTER — TELEPHONE (OUTPATIENT)
Dept: FAMILY MEDICINE CLINIC | Facility: CLINIC | Age: 58
End: 2018-05-23

## 2018-05-23 ENCOUNTER — MED REC SCAN ONLY (OUTPATIENT)
Dept: HEMATOLOGY/ONCOLOGY | Facility: HOSPITAL | Age: 58
End: 2018-05-23

## 2018-05-23 ENCOUNTER — TELEPHONE (OUTPATIENT)
Dept: HEMATOLOGY/ONCOLOGY | Facility: HOSPITAL | Age: 58
End: 2018-05-23

## 2018-05-23 DIAGNOSIS — C20 RECTAL CANCER (HCC): ICD-10-CM

## 2018-05-23 DIAGNOSIS — C20 RECTAL CANCER (HCC): Primary | ICD-10-CM

## 2018-05-23 PROCEDURE — 36415 COLL VENOUS BLD VENIPUNCTURE: CPT

## 2018-05-23 PROCEDURE — 85025 COMPLETE CBC W/AUTO DIFF WBC: CPT

## 2018-05-23 NOTE — TELEPHONE ENCOUNTER
s/w nehemiah LYMAN. She said pt just called there and is going to have this drawn at Dr Felipa Mcgarry office. She has faxed order there and has advised me to disregard fax that was sent here. I will look for fax and shred. Nothing further needed on our end.

## 2018-05-23 NOTE — TELEPHONE ENCOUNTER
Emigdio Calhoun RN from [facility] calling. Pt having surgery there on Friday. They julio a cbc on 4/18, platelets were 88. Surgeon is asking for him to repeat this lab prior to surgery.   When Emigdio Calhoun notified pt to having to repeat this, he stated he [facility]

## 2018-05-23 NOTE — TELEPHONE ENCOUNTER
Pt called on this. I have updated him that I already rec'd call from [facility] on this and am trying to reach her to obtain an order. He will await my call back.

## 2018-05-23 NOTE — TELEPHONE ENCOUNTER
Patient is scheduled for surgery on Friday at [facility]. Patient reports [facility] called patient and reported his platelets are low and needs a repeat blood count. No labs on file. Most recent CBCs show normal platelet counts.  Advised saritha

## 2018-05-23 NOTE — TELEPHONE ENCOUNTER
Received lab orders from [facility] for patient to repeat CBC. Order placed.

## 2018-05-24 NOTE — TELEPHONE ENCOUNTER
Received request from [facility] for our office to fax results to [phone]. Faxed CBC and RCB morphology scan, as these were the most recent results.

## 2018-08-02 ENCOUNTER — OFFICE VISIT (OUTPATIENT)
Dept: HEMATOLOGY/ONCOLOGY | Age: 58
End: 2018-08-02
Attending: INTERNAL MEDICINE
Payer: COMMERCIAL

## 2018-08-02 VITALS
BODY MASS INDEX: 25 KG/M2 | OXYGEN SATURATION: 99 % | HEART RATE: 65 BPM | SYSTOLIC BLOOD PRESSURE: 120 MMHG | TEMPERATURE: 97 F | WEIGHT: 173.19 LBS | DIASTOLIC BLOOD PRESSURE: 75 MMHG | RESPIRATION RATE: 18 BRPM

## 2018-08-02 DIAGNOSIS — T45.1X5A PERIPHERAL NEUROPATHY DUE TO CHEMOTHERAPY (HCC): ICD-10-CM

## 2018-08-02 DIAGNOSIS — C20 RECTAL CANCER (HCC): Primary | ICD-10-CM

## 2018-08-02 DIAGNOSIS — G62.0 PERIPHERAL NEUROPATHY DUE TO CHEMOTHERAPY (HCC): ICD-10-CM

## 2018-08-02 LAB — CEA SERPL-MCNC: 9.6 NG/ML (ref 0.5–5)

## 2018-08-02 PROCEDURE — 99214 OFFICE O/P EST MOD 30 MIN: CPT | Performed by: INTERNAL MEDICINE

## 2018-08-02 NOTE — PROGRESS NOTES
Pt here for 4 month MD f/u. Pt had ileostomy reversal 5/25. Pt using Imodium for diarrhea. Energy level and appetite is good. Pt has continued neuropathy, constant in feet and occasional in hands.      Outpatient Oncology Care Plan  Problem list:  fatigue

## 2018-08-06 NOTE — PROGRESS NOTES
[facility]    PATIENT'S NAME: Natalie Howell   ATTENDING PHYSICIAN: LESLIE Allen    PATIENT ACCOUNT #: [de-identified] LOCATION: [address] [place] [address] RECORD #: [id number] YOB: 1960   DATE OF SERVICE: 08/02/2018       CANCER CENTER P NEUROLOGIC:  He is intact. LABORATORY DATA:  He did have a CEA done today, and it is slightly high at 9.6. IMPRESSION:  Rectal cancer:  He needs a CT scan done and I am asking him to get this done within the next month.   He also needs to have re

## 2018-08-08 ENCOUNTER — HOSPITAL ENCOUNTER (OUTPATIENT)
Dept: CT IMAGING | Age: 58
Discharge: HOME | End: 2018-08-08
Attending: INTERNAL MEDICINE
Payer: COMMERCIAL

## 2018-08-08 DIAGNOSIS — C20 RECTAL CANCER (HCC): ICD-10-CM

## 2018-08-08 LAB — CREAT SERPL-MCNC: 1.1 MG/DL (ref 0.7–1.3)

## 2018-08-08 PROCEDURE — 74177 CT ABD & PELVIS W/CONTRAST: CPT | Performed by: INTERNAL MEDICINE

## 2018-08-08 PROCEDURE — 82565 ASSAY OF CREATININE: CPT

## 2018-08-08 PROCEDURE — 71260 CT THORAX DX C+: CPT | Performed by: INTERNAL MEDICINE

## 2018-08-09 ENCOUNTER — OFFICE VISIT (OUTPATIENT)
Dept: HEMATOLOGY/ONCOLOGY | Age: 58
End: 2018-08-09
Attending: INTERNAL MEDICINE
Payer: COMMERCIAL

## 2018-08-09 DIAGNOSIS — C20 RECTAL CANCER (HCC): Primary | ICD-10-CM

## 2018-08-09 DIAGNOSIS — R93.2 ABNORMAL CT SCAN, LIVER: ICD-10-CM

## 2018-08-09 DIAGNOSIS — R93.5 ABNORMAL CT SCAN, PELVIS: ICD-10-CM

## 2018-08-09 PROCEDURE — 99211 OFF/OP EST MAY X REQ PHY/QHP: CPT

## 2018-08-09 RX ORDER — HYDROCODONE BITARTRATE AND ACETAMINOPHEN 10; 325 MG/1; MG/1
1 TABLET ORAL ONCE
Status: COMPLETED | OUTPATIENT
Start: 2018-08-09 | End: 2018-08-09

## 2018-08-09 RX ORDER — HYDROCODONE BITARTRATE AND ACETAMINOPHEN 10; 325 MG/1; MG/1
1 TABLET ORAL ONCE
Status: CANCELLED
Start: 2018-08-09 | End: 2018-08-09

## 2018-08-09 RX ADMIN — HYDROCODONE BITARTRATE AND ACETAMINOPHEN 1 TABLET: 10; 325 TABLET ORAL at 12:45:00

## 2018-08-09 NOTE — PROGRESS NOTES
ANP Visit Note    Patient Name: Dorota Portillo   YOB: 1960   Medical Record Number: [id number]   CSN: [id number]   Date of visit: 8/9/2018       Chief Complaint/Reason for Visit:  Rectal Cancer, Left sided pain and nausea     Oncologic Histo Mother      colon ca age 80   • Cancer Sister      age 44 Breast CA   • Diabetes Brother    • Cancer Sister      melanoma       Social History:    Social History  Social History   Marital status:   Spouse name: N/A    Years of education: N/A  Number regions. Psych/Depression: mood and affect are appropriate. Labs:  Results for Kirstin Lara (MRN [id number]) as of 8/9/2018 18:09   Ref.  Range 8/2/2018 13:16   CEA Latest Ref Range: 0.5 - 5.0 ng/mL 9.6 (H)     Radiology:  PROCEDURE:  CT CHEST+ABDO is mild left hydronephrosis due to a metastatic lesion along left side of the peritoneum that may be invading the proximal left ureter best seen on image 169 series 3. AORTA/VASCULAR:  Mild mixed plaque in the aorta and iliac arteries.   RETROPERITONEUM: metastatic deposits along the left side the retroperitoneum and within the pelvis in the perirectal space extending into the pre rectal space as above.      4.  No evidence of new or progressive disease in the chest.        Please see above for further deta

## 2018-08-10 ENCOUNTER — TELEPHONE (OUTPATIENT)
Dept: FAMILY MEDICINE CLINIC | Facility: CLINIC | Age: 58
End: 2018-08-10

## 2018-08-10 NOTE — TELEPHONE ENCOUNTER
Pt wrote on mychart and he needs a nurse to call him ASAP. It looks like he received his results from the oncologist.  Pt does not sound ok. Urgent -Please call me at  ASAP regarding my CAT scan results from 8/8/18.

## 2018-08-10 NOTE — TELEPHONE ENCOUNTER
Pt called requesting a copy of his CT of the chest and abdomen that Dr. Kaitlin Wiseman ordered. Pt saw Dr. Noemy Maxwell NP yesterday and went over the results.  They ordered a pet scan for him and he is having that done on Tuesday and has a follow up appt on Thursday w

## 2018-08-10 NOTE — TELEPHONE ENCOUNTER
I did leave a detailed message that I would be happy to answer any questions he may have to the best of my knowledge, but it would be jin to call .

## 2018-08-11 PROCEDURE — 88381 MICRODISSECTION MANUAL: CPT

## 2018-08-11 PROCEDURE — 81301 MICROSATELLITE INSTABILITY: CPT

## 2018-08-14 ENCOUNTER — HOSPITAL ENCOUNTER (OUTPATIENT)
Dept: NUCLEAR MEDICINE | Facility: HOSPITAL | Age: 58
Discharge: HOME | End: 2018-08-14
Attending: CLINICAL NURSE SPECIALIST
Payer: COMMERCIAL

## 2018-08-14 DIAGNOSIS — R93.2 ABNORMAL CT SCAN, LIVER: ICD-10-CM

## 2018-08-14 DIAGNOSIS — R93.5 ABNORMAL CT SCAN, PELVIS: ICD-10-CM

## 2018-08-14 DIAGNOSIS — C20 RECTAL CANCER (HCC): ICD-10-CM

## 2018-08-14 LAB — GLUCOSE BLD-MCNC: 78 MG/DL (ref 65–99)

## 2018-08-14 PROCEDURE — 78815 PET IMAGE W/CT SKULL-THIGH: CPT | Performed by: CLINICAL NURSE SPECIALIST

## 2018-08-14 PROCEDURE — 82962 GLUCOSE BLOOD TEST: CPT

## 2018-08-16 ENCOUNTER — OFFICE VISIT (OUTPATIENT)
Dept: HEMATOLOGY/ONCOLOGY | Age: 58
End: 2018-08-16
Attending: INTERNAL MEDICINE
Payer: COMMERCIAL

## 2018-08-16 ENCOUNTER — RESEARCH ENCOUNTER (OUTPATIENT)
Dept: HEMATOLOGY/ONCOLOGY | Facility: HOSPITAL | Age: 58
End: 2018-08-16

## 2018-08-16 VITALS
OXYGEN SATURATION: 100 % | SYSTOLIC BLOOD PRESSURE: 147 MMHG | TEMPERATURE: 97 F | HEART RATE: 57 BPM | BODY MASS INDEX: 25 KG/M2 | RESPIRATION RATE: 18 BRPM | DIASTOLIC BLOOD PRESSURE: 78 MMHG | WEIGHT: 173.63 LBS

## 2018-08-16 DIAGNOSIS — C77.2 SECONDARY MALIGNANT NEOPLASM OF RETROPERITONEAL LYMPH NODES (HCC): ICD-10-CM

## 2018-08-16 DIAGNOSIS — C78.7 SECONDARY LIVER CANCER (HCC): ICD-10-CM

## 2018-08-16 DIAGNOSIS — C20 RECTAL CANCER (HCC): Primary | ICD-10-CM

## 2018-08-16 PROCEDURE — 99214 OFFICE O/P EST MOD 30 MIN: CPT | Performed by: INTERNAL MEDICINE

## 2018-08-16 NOTE — PROGRESS NOTES
STUDY: ELIERS-2    CONSENT NOTE    Met with patient following MD visit to discuss PET scan results and future POC. Patient is s/p adjuvant chemo for his rectal cancer. CT scan shows  metastatic disease. Reviewed consent with patient, wife and his daughter.

## 2018-08-16 NOTE — PROGRESS NOTES
Pt here for 1 week MD jiménez/sam. Pt had PET 8/14. Energy level and appetite has been good. Pt using Norco 1-2x's/day and pain is under control. Pt has frequent BM's, not loose, uses Imodium BID usually.      Outpatient Oncology Care Plan  Problem list:  fatigue

## 2018-08-17 ENCOUNTER — TELEPHONE (OUTPATIENT)
Dept: HEMATOLOGY/ONCOLOGY | Facility: HOSPITAL | Age: 58
End: 2018-08-17

## 2018-08-17 ENCOUNTER — LAB ENCOUNTER (OUTPATIENT)
Dept: LAB | Facility: HOSPITAL | Age: 58
End: 2018-08-17
Attending: INTERNAL MEDICINE
Payer: COMMERCIAL

## 2018-08-17 VITALS — BODY MASS INDEX: 25 KG/M2 | HEIGHT: 70 IN

## 2018-08-17 DIAGNOSIS — R69 DIAGNOSIS UNKNOWN: Primary | ICD-10-CM

## 2018-08-20 PROBLEM — C78.7 SECONDARY LIVER CANCER (HCC): Status: ACTIVE | Noted: 2018-01-01

## 2018-08-20 PROBLEM — C77.2 SECONDARY MALIGNANT NEOPLASM OF RETROPERITONEAL LYMPH NODES (HCC): Status: ACTIVE | Noted: 2018-01-01

## 2018-08-20 NOTE — PROGRESS NOTES
STUDY: LUCIAN2   PHONE CALL    Patient called today. States he is nervous and anxious regarding his news of metastatic disease and doesn't understand why we are waiting. He is unclear as to what the plan is.    I allowed patient to vent his feelings, provid

## 2018-08-21 NOTE — PROGRESS NOTES
[facility]    PATIENT'S NAME: Lola Sibley   ATTENDING PHYSICIAN: LESLIE Padilla    PATIENT ACCOUNT #: [de-identified] LOCATION: [address] [place] [address] RECORD #: [id number] YOB: 1960   DATE OF SERVICE: 08/16/2018       CANCER CENTER P early August and then he developed some pain in the left flank. This has improved. It is minimal at the present time. He does have hydrocodone available. He is taking 1 or 2 of these per day and the pain is under control.   He has frequent bowel movemen RAST gene testing, which we can do on the original specimen.   His questions were answered, and we will wait for the RAST gene testing to come back and then proceed with arranging a biopsy afterward to participate in the trial.  His labs will be rechecked,

## 2018-08-22 NOTE — PROGRESS NOTES
Patient presents with:  Pain: APN assessment; abdominal pain    Pt is here for a sick call due to abdominal pain. He states pain started 2 weeks ago and is relieved for short periods of time with pain medication.  Pt was unable to stand when he woke up this

## 2018-08-28 PROBLEM — C20 RECTAL CANCER METASTATIC TO BONE (HCC): Status: RESOLVED | Noted: 2018-01-01 | Resolved: 2018-01-01

## 2018-08-28 PROBLEM — C79.51 RECTAL CANCER METASTATIC TO BONE (HCC): Status: ACTIVE | Noted: 2018-01-01

## 2018-08-28 PROBLEM — C79.51 RECTAL CANCER METASTATIC TO BONE (HCC): Status: RESOLVED | Noted: 2018-01-01 | Resolved: 2018-01-01

## 2018-08-28 PROBLEM — C20 RECTAL CANCER METASTATIC TO BONE (HCC): Status: ACTIVE | Noted: 2018-01-01

## 2018-08-28 NOTE — PROGRESS NOTES
Education Record    Learner:  Patient    Disease / Diagnosis: Pain    Barriers / Limitations:  None   Comments:    Method:  Brief focused and Reinforcement   Comments:    General Topics:  Plan of care reviewed   Comments:    Outcome:  Shows understanding

## 2018-08-28 NOTE — PROGRESS NOTES
ANP Visit Note    Patient Name: Claritza Tyler   YOB: 1960   Medical Record Number: [id number]   CSN: [id number]   Date of visit: 8/28/2018       Chief Complaint/Reason for Visit:  Metastatic Rectal Cancer, Uncontrolled pain.      History of surgery football injury in HS  No date: PART REMOVAL COLON W END COLOSTOMY    Allergies:    Fish-Derived Produc*    RASH    Family History:  Family History   Problem Relation Age of Onset   • Other [OTHER] Father      dementia age 80   • Cancer Mother No JVD. No palpable lymphadenopathy. Neck is supple. Chest: Clear to auscultation. Heart: Regular rate and rhythm. Abdomen: Soft, non tender with good bowel sounds. Extremities: Pedal pulses are present. No edema. Neurological: Grossly intact.    Lymp Ref range: >=60               Status: Final  GFR, -American                         Date: 08/22/2018  Value: 70          Ref range: >=60               Status: Final  AST                                           Date: 08/22/2018  Value: 31 5.70 x10(*  Status: Final  HGB                                           Date: 08/22/2018  Value: 14.5        Ref range: 13.0 - 17.0 g/dL   Status: Final  HCT                                           Date: 08/22/2018  Value: 44.1        Ref range: 37.0 - Final  Neutrophil %                                  Date: 08/22/2018  Value: 62.0        Ref range: %                  Status: Final  Lymphocyte %                                  Date: 08/22/2018  Value: 20.2        Ref range: %                  Status:

## 2018-08-30 NOTE — IMAGING NOTE
Post perirectal mass biopsy. Tolerated procedure and sedation well. Arrived in department with left abd and back pain. Reports 8 out of 10. Will need to position prone and pt states he won't be able to because of pain. Fentanyl given before positioning.  Pt

## 2018-08-30 NOTE — OR NURSING
Performing interventional radiologist updated on pt status and order recv'd for discharge to home. Patient reports pain is much more tolerable and denies need for oral pain meds at this time. VSS.

## 2018-08-30 NOTE — PROGRESS NOTES
STUDY:ANCERS2- CANscript study  ID #     Requisition form sent over to [facility] 8/29/18 with potential treatment regimens listed per MD.     Patient arrived today for jalen-rectal biopsy & bloodwork per study.  Ally Nichole, liaison for [facility], present to a

## 2018-08-30 NOTE — H&P
[address] Patient Status:  Outpatient    1960 MRN [id number]   [facility] Attending TYLER Epstein   Hosp Day # 0 PCP SUSIE MISTRY DO     Admitting Diagnosis:   60 yo M recta 1000 units Oral Tab, Take by mouth., Disp: , Rfl:   •  HYDROcodone-acetaminophen 5-325 MG Oral Tab, Take 1-2 tablets by mouth every 6 (six) hours as needed for Pain., Disp: 60 tablet, Rfl: 0    Physical Exam & Review of Systems:   Physical Exam:    There w

## 2018-08-30 NOTE — PROGRESS NOTES
ANP Visit Note    Patient Name: Allen Morrison   YOB: 1960   Medical Record Number: [id number]   CSN: [id number]   Date of visit: 8/22/18      Chief Complaint/Reason for Visit:  Patient presents with:  Pain: APN assessment; abdominal pain Relation Age of Onset   • Other [OTHER] Father      dementia age 80   • Cancer Mother      colon ca age 80   • Cancer Sister      age 44 Breast CA   • Diabetes Brother    • Cancer Sister      melanoma       Social History:    Social History  Social History rhythm. Abdomen: Soft, non tender with good bowel sounds. Extremities: Pedal pulses are present. No edema. Neurological: Grossly intact. Lymphatics:  There is no palpable lymphadenopathy throughout in the cervical,supraclavicular, axillary, or inguina Clinical   Pathology, College of American Pathologists, Association for   Molecular Pathology, and the American Society of                          Clinical Oncology   regarding molecular biomarkers for the evaluation of colorectal   cancer (J Clin Oncol. available, will arrange for him to start therapy ASAP    Risk Level: High: metastatic rectal cancer with symptoms.      Electronically Signed by:    aMri Phelan ANP-BC  Nurse Practitioner  [facility]

## 2018-08-30 NOTE — PROCEDURES
[facility]  Procedure Note    Rocio Abebe Patient Status:  Outpatient    1960 MRN [id number]   [facility] CT Attending TYLER Flores   Hosp Day # 0 PCP SUSIE MISTRY,      Procedure: CT guided biopsy, perirectal m

## 2018-08-31 PROBLEM — R73.9 HYPERGLYCEMIA: Status: ACTIVE | Noted: 2018-01-01

## 2018-08-31 PROBLEM — N13.5 URETERAL OBSTRUCTION: Status: ACTIVE | Noted: 2018-01-01

## 2018-08-31 PROBLEM — N13.30 HYDRONEPHROSIS, UNSPECIFIED HYDRONEPHROSIS TYPE: Status: ACTIVE | Noted: 2018-01-01

## 2018-08-31 PROBLEM — G43.019 INTRACTABLE MIGRAINE WITHOUT AURA: Status: ACTIVE | Noted: 2018-01-01

## 2018-08-31 PROBLEM — E87.1 HYPONATREMIA: Status: ACTIVE | Noted: 2018-01-01

## 2018-08-31 NOTE — PROGRESS NOTES
ANP Visit Note    Patient Name: Kelly Harrington   YOB: 1960   Medical Record Number: [id number]   CSN: [id number]   Date of visit: 8/31/2018       Chief Complaint/Reason for Visit:  Recurrent metastatic Rectal Cancer, intractable migraine. COLOSTOMY    Allergies:    Fish-Derived Produc*    RASH    Family History:  Family History   Problem Relation Age of Onset   • Other [OTHER] Father      dementia age 80   • Cancer Mother      colon ca age 80   • Cancer Sister      age 44 Breast CA   • Diab Regular rate and rhythm. Abdomen: Soft, non tender with good bowel sounds. Right sided tender to palpation. Extremities: Pedal pulses are present. No edema. Neurological: Grossly intact. Lymphatics:  There is no palpable lymphadenopathy throughout in

## 2018-09-01 NOTE — ED PROVIDER NOTES
Patient Seen in: [facility] Emergency Department    History   Patient presents with:  Abdomen/Flank Pain (GI/)    Stated Complaint: abd pan    HPI    CHIEF COMPLAINT: Left flank pain    HISTORY OF PRESENT ILLNESS: Patient is a 17-year-old male pre SURGICAL HISTORY      Comment: left shoulder surgery football injury in HS  No date: PART REMOVAL COLON W END COLOSTOMY        Smoking status: Never Smoker                                                              Smokeless tobacco: Never Used METABOLIC PANEL (14) - Abnormal; Notable for the following:        Result Value    Glucose 111 (*)     Sodium 133 (*)     Chloride 98 (*)     Creatinine 1.39 (*)     GFR, Non- 55 (*)     Total Protein 8.5 (*)     Albumin 3.1 (*)     Globuli pelvic hematoma following CT-guided biopsy.           Dictated by: Hoang Simental MD on 8/31/2018 at 19:34       Approved by: Hoang Simental MD                          ED Course as of Aug 31 2026  -------------------------------------------------------- 8/28/2017 Unknown

## 2018-09-01 NOTE — CONSULTS
[facility]  Report of Consultation    Allen Morrison Patient Status:  Inpatient    1960 MRN [id number]   [facility] 4NW-A Attending Luis Enrique Brewer MD   Hosp Day # 1 PCP SUSIE MISTRY, DO     Reason for Consultation:  Left f HYDROcodone-acetaminophen (NORCO) 5-325 MG per tab 1-2 tablet, 1-2 tablet, Oral, Q6H PRN  •  Loperamide HCl (IMODIUM) cap 2 mg, 2 mg, Oral, QID PRN  •  morphINE Sulfate ER (MS CONTIN) CR tab 15 mg, 15 mg, Oral, Q12H  •  0.9%  NaCl infusion, , Intravenous, Value Date   WBC 9.9 08/31/2018   HGB 12.9 08/31/2018   HCT 38.7 08/31/2018   .0 08/31/2018   CREATSERUM 1.39 08/31/2018   BUN 15 08/31/2018    08/31/2018   K 4.0 08/31/2018   CL 98 08/31/2018   CO2 28.0 08/31/2018    08/31/2018   CA 9. is poorly defined appearing probably partially necrotic measuring about 3.5 x 3.2 cm and there is thickened appearance of the soft tissues all around the left renal pelvis, renal sinus, proximal left ureter and perinephric region.   Stable   appearance of t retroperitoneal lymph nodes (HCC)     Mass of perirectal soft tissue     Intractable migraine without aura     Hyponatremia     Hyperglycemia     Hydronephrosis     Acute kidney insufficiency     Hydronephrosis, unspecified hydronephrosis type     Ureteral

## 2018-09-01 NOTE — ANESTHESIA PREPROCEDURE EVALUATION
PRE-OP EVALUATION    Patient Name: Margery Leventhal    Pre-op Diagnosis: Hydronephrosis [N13.30]    Procedure(s):  CYSTOSCOPY , RETROGRADE, PYELOGRAM, INSERTION STENT INSERTION, LEFT    Surgeon(s) and Role:     Jaja Warner MD - Primary    Pre-op ronn Or      morphINE sulfate (PF) 4 MG/ML injection 4 mg 4 mg Intravenous Q2H PRN   ondansetron HCl (ZOFRAN) injection 4 mg 4 mg Intravenous Q6H PRN   Metoclopramide HCl (REGLAN) injection 10 mg 10 mg Intravenous Q8H PRN   [COMPLETED] fentaNYL citrate (SUBLI HGB 11.7 (L) 09/01/2018   HCT 35.3 (L) 09/01/2018   MCV 90.5 09/01/2018   MCH 30.0 09/01/2018   MCHC 33.1 09/01/2018   RDW 12.4 09/01/2018   .0 09/01/2018       Lab Results  Component Value Date    09/01/2018   K 5.1 09/01/2018    09/0

## 2018-09-01 NOTE — PROGRESS NOTES
[facility]  Progress Note     [facility],7Gws Patient Status:  Inpatient    1960 MRN [id number]   Location [address] [place] [address] Attending Laureano Raza MD   Hosp Day # 1 PCP SUSIE MISTRY DO     CC: Intractable left-sided abdomi 09/01/2018    09/01/2018   K 5.1 09/01/2018    09/01/2018   CO2 28.0 09/01/2018    09/01/2018   CA 8.7 09/01/2018   ALB 3.1 08/31/2018   ALKPHO 106 08/31/2018   BILT 0.5 08/31/2018   TP 8.5 08/31/2018   AST 25 08/31/2018   ALT 18 08/31/2 left retroperitoneal mass. The hydronephrosis is considered at least moderate in degree, with enlargement of the left kidney and   obvious perinephric stranding.   Delayed images showed some excretion of contrast in the collecting system of the left kidney hematoma following CT-guided biopsy.            Dictated by: Lawrence Zavala MD on 8/31/2018 at 19:34     Meds:     Current Facility-Administered Medications:  0.9%  NaCl infusion 125 mL/hr Intravenous Continuous   HYDROcodone-acetaminophen (NORCO) 5-325 MG no  · Central line: no    Estimated date of discharge: To be decided  Discharge is dependent on: Clinical progress   At this point Mr. Bree Morse  is expected to be discharge to:  To be decided     Plan of carediscussed with patient, ESMER Dumas,

## 2018-09-01 NOTE — H&P
[facility]  History and Physical     Juan Martínez Patient Status:  Emergency    1960 MRN [id number]   Location [address] [place] [address] Attending Giuliana Dunne MD   Hosp Day # 0 [address],      Chief Com NAUSEA ONLY, DIZZINESS, FATIGUE  Fish-Derived Produc*    RASH    Medications:    Current Facility-Administered Medications on File Prior to Encounter:  dexamethasone PF (DECADRON) 8 mg in sodium chloride 0.9 % 50.8 mL IVPB 8 mg Intravenous Once Natalio Marques tenderness or deformity. Abdomen: Soft, L Flank/ LLQ tender, nondistended. Positive bowel sounds. No rebound, guarding or organomegaly. Neurologic: No focal neurological deficits. CNII-XII grossly intact. Musculoskeletal: Moves all extremities.   Extrem

## 2018-09-01 NOTE — PROGRESS NOTES
NURSING ADMISSION NOTE      Patient admitted via cart  Oriented to room. Safety precautions initiated. Bed in low position. Call light in reach. MD aware of admission. Family at bedside.

## 2018-09-01 NOTE — ANESTHESIA POSTPROCEDURE EVALUATION
[address] Patient Status:  Inpatient   Age/Gender 59year old male MRN [id number]   Location [address] [facility] Attending Manuelito Miramontes MD   Hosp Day # 1 PCP SUSIE MISTRY, DO       Anesthesia Post-op N

## 2018-09-01 NOTE — CONSULTS
[facility]    Report of Consultation    Dorota Moraesic Patient Status:  Inpatient    1960 MRN [id number]   [facility] 4NW-A Attending Ciara Mann MD   [facility] Day # 1 PCP SUSIE MISTRY,      Date of Admission:   smokeless tobacco. He reports that he drinks alcohol. He reports that he does not use drugs.     Allergies:    Hydromorphone           NAUSEA ONLY, DIZZINESS, FATIGUE  Fish-Derived Produc*    RASH    Medications:    Current Facility-Administered Medications 24.39 kg/m²    HEENT: EOMs intact. PERRL. Oropharynx is clear. Neck: No JVD. No palpable lymphadenopathy. Neck is supple. Chest: Clear to auscultation. Heart: Regular rate and rhythm. Abdomen: Soft, non tender with good bowel sounds.    Extremitie without aura     Hyponatremia     Hyperglycemia     Hydronephrosis     Acute kidney insufficiency     Hydronephrosis, unspecified hydronephrosis type     Ureteral obstruction      Hydronephrosis: Secondary to obstruction of the ureter by his retroperitonea

## 2018-09-01 NOTE — PROGRESS NOTES
NURSING ADMISSION NOTE      Admission navigator completed. Patient alert and oriented x4. Complains of 10/10 pain in abdomen. Family at bedside. Report given to ESMER Phoenix. Patient going to room 410.

## 2018-09-01 NOTE — PLAN OF CARE
PAIN - ADULT    • Verbalizes/displays adequate comfort level or patient's stated pain goal Progressing           Back from cystogram and left side stent placement,Tolerated the procedure well,  Denies any pain after procedure,started on clear liquid diet a

## 2018-09-02 NOTE — PROGRESS NOTES
[facility]  Progress Note    [address] [facility],7Gws Patient Status:  Inpatient    1960 MRN [id number]   Location [address] [place] [address] Attending Eusebio Sesay MD   [facility] Day # 2 PCP SUSIE MISTRY DO     CC: Intractable left-sided abdomi ONLY)(CPT=71260/33455), 8/08/2018, 17:56. JAYJAY , CT BIOPSY ABDOMEN RETROPERITONEAL MASS (CPT=77012/43340), 8/30/2018, 9:49.      INDICATIONS:  abd pan     TECHNIQUE:  CT scanning was performed from the dome of the diaphragm to the pubic symphysis with no muscle is poorly defined appearing probably partially necrotic measuring about 3.5 x 3.2 cm and there is thickened appearance of the soft tissues all around the left renal pelvis, renal sinus, proximal left ureter and perinephric region.   Stable   appear CONTIN) CR tab 15 mg 15 mg Oral Q12H   0.9%  NaCl infusion  Intravenous Continuous   Heparin Sodium (Porcine) 5000 UNIT/ML injection 5,000 Units 5,000 Units Subcutaneous Q8H [address]   acetaminophen (TYLENOL) tab 650 mg 650 mg Oral Q6H PRN   morphINE sulfate (P Giovanny Silva MD  9/2/2018

## 2018-09-02 NOTE — PLAN OF CARE
Patient denies any complaints of pain,on room air, vitals stable, voiding without difficulty. Discharged from urology and all md's on case, left hospital per wheelchair, see discharge information record.

## 2018-09-02 NOTE — DISCHARGE SUMMARY
[facility]  Discharge Summary     [facility],7Gws Patient Status:  Inpatient    1960 MRN [id number]   [facility] 4NW-A Attending Keyshawn Freire MD   [facility] Day # 2 PCP SUSIE MISTRY DO     Date of Admission: 2018    Date biopsy.           Dictated by: Cyd Kehr, MD on 8/31/2018 at 19:34   Seen by urology on consult and status post  cystoscopy and left ureteral stent by Dr Odessa Ramirez on 9/1/2018. Seen by oncology. Pain relieved with stent placement.  Tolerated diet and had Location: Right arm)   Pulse 59   Temp 98.1 °F (36.7 °C) (Oral)   Resp 18   Ht 5' 10\" (1.778 m)   Wt 170 lb (77.1 kg)   SpO2 95%   BMI 24.39 kg/m²       General appearance: alert and cooperative  Head: Normocephalic, without obvious abnormality, atraumati

## 2018-09-02 NOTE — BRIEF OP NOTE
Pre-Operative Diagnosis: Left Hydronephrosis [N13.30]     Post-Operative Diagnosis: Left Hydronephrosis [N13.30]     Procedure Performed:   Procedure(s):  CYSTOSCOPY, LEFT  RETROGRADE PYELOGRAM,  LEFT STENT INSERTION    Surgeon(s) and Role:     Danetta Gitelman, Cr

## 2018-09-02 NOTE — PROGRESS NOTES
[facility]    Progress Note     Emelina Ramirez,Jose Patient Status:  Inpatient    1960 MRN [id number]   [facility] 4NW-A Attending Kristel Aleman MD   [facility] Day # 2 PCP SUSIE MISTRY, DO     Subjective:   [address],7Gws is a(n)

## 2018-09-02 NOTE — PLAN OF CARE
PAIN - ADULT    • Verbalizes/displays adequate comfort level or patient's stated pain goal Progressing        Pt aox4. C/O penile pain 5/10,asked for Tylenol and refused his scheduled Morphine po b/c it makes his mouth dry.0.9NS infusing at 125cc/hr. Pt uses

## 2018-09-04 NOTE — PAYOR COMM NOTE
--------------  Admit to inpatient Once (Order #[id number]) on 8/31/18    ADMISSION REVIEW     Payor: [address] #:  [id number]  Authorization Number: [id number]    Admit date: 8/31/18  Admit time: 2253       Patient See 133 (*)     Chloride 98 (*)     Creatinine 1.39 (*)     GFR, Non- 55 (*)     Total Protein 8.5 (*)     Albumin 3.1 (*)     Globulin  5.4 (*)     A/G Ratio 0.6 (*)     All other components within normal limits   CBC W/ DIFFERENTIAL - Abnorma pain. Pt states pain has been getting worse over the past few days. He denies any N/V/D. He denies any F/C. He has been trying to take his oral pain meds without any relief. He otherwise denies any CP or SOB.          Physical Exam:    BP (!) 171/95   Pulse OBSTRUCTION.

## 2018-09-04 NOTE — PAYOR COMM NOTE
--------------  DISCHARGE REVIEW    Payor: [address] #:  [id number]  Authorization Number: [id number]      [facility]  Discharge Summary     [address] Patient Status:  Inpatient    1960 MRN [id number] metastatic disease appearing more prominent than before.  Pelvic masses are redemonstrated.  No evidence of pelvic hematoma following CT-guided biopsy.         Dictated by: Lawrence Zavala MD on 8/31/2018 at 19:34   Seen by urology on consult and status po

## 2018-09-05 NOTE — TELEPHONE ENCOUNTER
Left message, expecting results in next day or two, scheduled for [facility] today, will arrange for chemo once results are back.

## 2018-09-05 NOTE — PROGRESS NOTES
Patient is s/p PAC insertion; a/o x4; hemodynamically stable/neurologically intact; came in complaining of severe abdominal pain that has been going on for the past 2 weeks;   ROMINA SCOTT notified; she spoke with patient's sister, who was able to  n

## 2018-09-05 NOTE — PROCEDURES
[facility]  Procedure Note    Slim Alexander Patient Status:  Outpatient in a Bed    1960 MRN [id number]   Location [facility] Attending Izabela Howard MD   Hosp Day # 0 PCP SUSIE MISTRY DO     Procedu

## 2018-09-05 NOTE — PRE-SEDATION ASSESSMENT
[facility]  IR Pre-Procedure Sedation Assessment    History of snoring or sleep or apnea?    Yes    History of previous problems with anesthesia or sedation  No    Physical Findings:  Neck: nl ROM  CV: nl  PULM: normal respiratory rate/effort    [place]

## 2018-09-06 PROBLEM — N13.30 HYDRONEPHROSIS, LEFT: Status: ACTIVE | Noted: 2018-01-01

## 2018-09-06 PROBLEM — T83.84XA PAIN DUE TO URETERAL STENT, INITIAL ENCOUNTER (HCC): Status: ACTIVE | Noted: 2018-01-01

## 2018-09-06 PROBLEM — N13.5 OBSTRUCTION OF LEFT URETER: Status: ACTIVE | Noted: 2018-01-01

## 2018-09-07 NOTE — OPERATIVE REPORT
[facility]    PATIENT'S NAME: Agneskelli Juareznick   ATTENDING PHYSICIAN: Anna Gomez M.D. OPERATING PHYSICIAN: Sherlyn Berrios M.D.    PATIENT ACCOUNT#:   [de-identified]    LOCATION:  [address] [place] [address]  MEDICAL RECORD #:   [id number]       DATE OF BIRTH:  [birth year] None.    CONDITION:  Good. DRAINS:  A 6-Setswana 26 cm double-J stent. SPECIMEN:  None.     Dictated By Osvaldo Gonzalez M.D.  d: 09/07/2018 08:05:42  t: 09/07/2018 08:27:07  Yamilet Varela [id number]  /

## 2018-09-10 NOTE — PROGRESS NOTES
Chemotherapy Education    Learner:  Patient and Spouse    Barriers / Limitations:  Emotional factors    Chemotherapy education goals:  · Learn the drug names  · Administration schedule  · Routes of administration  · Treatment setting    Drug names:  Fluoro Achieved    Possible menstrual irregularity and vaginal dryness:  N/A    Notify MD/RN of any changes or problems:  Achieved    Comments:    Treatment Effects on Emotional Status:    Potential mood changes, depression, nervousness, difficulty sleeping:  Ach

## 2018-09-13 NOTE — PATIENT INSTRUCTIONS
To reach Dr Emiliano green during business hours, please call [phone]. After hours, including weekends, evenings, and holidays, please call the main number [phone] for emergent needs.

## 2018-09-14 NOTE — TELEPHONE ENCOUNTER
Fam called requesting additional information to complete the request for chemo approval. Please fax info to [phone] or call them at [phone].

## 2018-09-17 NOTE — PROGRESS NOTES
[facility]    PATIENT'S NAME: Lesvia Cheng   ATTENDING PHYSICIAN: LESLIE Lamb    PATIENT ACCOUNT #: [de-identified] LOCATION: [address] [place] [address] RECORD #: [id number] YOB: 1960   DATE OF SERVICE: 09/13/2018       CANCER CENTER P mg daily, ondansetron 8 mg q.8 h. p.r.n., and prochlorperazine 10 mg q.6 h. p.r.n. PHYSICAL EXAMINATION:    GENERAL:  He is a well-developed, well-nourished male. He is in no acute distress. VITAL SIGNS:  His performance status is 1.   His weight is 16 pelvis. I will keep him in the loop and hopefully, we will see a drop in his CEA.   He did participate in the [facility] trial, and they predicted less than optimal response to FOLFOXIRI, but the trouble is that we do not yet have clinical validation, and as a

## 2018-09-20 PROBLEM — R11.2 INTRACTABLE NAUSEA AND VOMITING: Status: ACTIVE | Noted: 2018-01-01

## 2018-09-20 NOTE — PROGRESS NOTES
Patient here for lab draw and APN assessment. He reports he has not been feeling well, his appetite has not been good and he is having a hard time controlling his nausea. Kept for iv fluids and iv antinausea medications.  Discussed plan of care with patient

## 2018-09-20 NOTE — PROGRESS NOTES
ANP Visit Note    Patient Name: Margery Leventhal   YOB: 1960   Medical Record Number: [id number]   CSN: [id number]   Date of visit: 9/20/2018       Chief Complaint/Reason for Visit:  Metastatic Rectal Cancer, Day 8 evaluation    Oncologic Hist radiation  No date: Liver cancer ([facility] [address]  03/2018: Personal history of antineoplastic chemotherapy  No date: Rectal cancer (HCC)  No date: Renal disorder      Comment:  swelling of left kidney  No date: Syncope  No date: Visual impairment      Comment:  glas insecurity - inability: Not on file      Transportation needs - medical: Not on file      Transportation needs - non-medical: Not on file    Occupational History      Not on file    Tobacco Use      Smoking status: Never Smoker      Smokeless tobacco: Ninoska Watt as needed for Diarrhea., Disp: , Rfl:   •  Cholecalciferol (VITAMIN D) 1000 units Oral Tab, Take 1,000 Units by mouth daily. , Disp: , Rfl:     Review of Systems:  A comprehensive 14 point review of systems was completed.   Pertinent positives and negative Date: 09/20/2018  Value: 15          Ref range: 8 - 20 mg/dL       Status: Final  Creatinine                                    Date: 09/20/2018  Value: 1.24        Ref range: 0.70 - 1.30 mg/dL  Status: Final  BUN/CREA Ratio 32.5        Ref range: 31.0 - 37.0 g/dL   Status: Final  RDW                                           Date: 09/20/2018  Value: 12.7        Ref range: 11.5 - 16.0 %      Status: Final  RDW-SD                                        Date: 09/20/2018  Value: Ref range: %                  Status: Final  Total Cells Counted                           Date: 09/20/2018  Value: 100           Status: Final  RBC Morphology                                Date: 09/20/2018  Value: Normal      Ref range: Normal

## 2018-09-27 NOTE — PROGRESS NOTES
Patient is here for MD f/u and cycle 2 of chemo. Patient with increase nausea episodes the first week of chemo. Patient was alternating Compazine and Zofran every 4 hours with good relief. Appetite is good. Bouts of diarrhea and constipation.  Extreme fatig

## 2018-10-02 NOTE — PROGRESS NOTES
[facility]    PATIENT'S NAME: Helen Khan   ATTENDING PHYSICIAN: Anna Grady M.D.    PATIENT ACCOUNT #: [de-identified] LOCATION: [address] [place] [address] RECORD #: [id number] YOB: 1960   DATE OF SERVICE: 09/27/2018       CANCER ARNIE he could have vocal cord dysfunction from either the oxaliplatin or the Avastin.   His current medications include cranberry supplement; chlorpromazine 10 mg t.i.d. hiccups; vitamin D 1000 units daily; diazepam 5 mg q.8 h. p.r.n.; Lomotil 1 to 2 tablets q.i result of this treatment since it is fairly intensive, but so far he has handled it. Dictated By Sharrie Councilman, M.D.  d: 10/01/2018 13:36:51  t: 10/01/2018 13:50:18  [facility] [id number]  /    cc: Dr. Rebeka Luke.  Ingrid,

## 2018-10-02 NOTE — PROGRESS NOTES
Pt here for iv fluids.   Arrives Ambulating independently, accompanied by Self           Modifications in dose or schedule: No     Frequency of blood return and site check throughout administration: Prior to administration   Discharged to Home, Ambulating i

## 2018-10-02 NOTE — PROGRESS NOTES
ANP Visit Note    Patient Name: Irineo Covarrubias   YOB: 1960   Medical Record Number: [id number]   CSN: [id number]   Date of visit: 10/2/2018       Chief Complaint/Reason for Visit:  Metastatic Rectal Cancer, Diarrhea, Weakness, Nausea     Onc N/A      Comment:  Procedure: COLONOSCOPY, POSSIBLE BIOPSY, POSSIBLE                POLYPECTOMY 98162;  Surgeon: Kris Cooper MD;                 Location: [address] [place] [address] date: COLONOSCOPY  7/31/2017: COLONOSCOPY, POSSIBLE BIOPSY, POSSIBLE Alcohol/week: 0.0 oz        Comment: Occasional      Drug use: No      Sexual activity: Not on file    Other Topics      Concerns:        Caffeine Concern: No        Exercise: Yes          4 days per week        Seat Belt: Yes        Special Diet: No Examination:  General: Patient is alert and oriented x 3, ill appearing   Vital Signs: /79 (BP Location: Left arm, Patient Position: Sitting, Cuff Size: adult)   Pulse 77   Temp 97.5 °F (36.4 °C) (Tympanic)   Resp 18   SpO2 100%   HEENT: EOMs intact. the patient's emotional well-being and any concerns about anxiety or depression. We discussed issues of distress, coping difficulties and social support systems and currently there are no active problems.     Planned Follow Up: THursday for assessment and

## 2018-10-04 NOTE — PROGRESS NOTES
Patient here for fluids today. He reports his throat has been hurting and having difficulty swallowing. Jen SCOTT assessed patient and mouth sores noted at back of throat. Per TYLER, magic mouth wash ordered.  He will return tomorrow for more fluids and antinau

## 2018-10-05 NOTE — PROGRESS NOTES
Here for fluids. States he is having some nausea. Zofran/dex given along w/ fluids. Felt pretty good after. Encouraged activity as he does like going to the \"Y\". He was accompanied by 2 daughters today.     Education Record  Learner:  Patient  Amanda

## 2018-10-11 NOTE — PROGRESS NOTES
Pt here for C3D1.   Arrives Ambulating independently, accompanied by Spouse           Modifications in dose or schedule: No     Frequency of blood return and site check throughout administration: Prior to administration   Discharged to Home, Ambulating inde

## 2018-10-11 NOTE — PATIENT INSTRUCTIONS
To reach Dr Maru geren during business hours, please call [phone]. After hours, including weekends, evenings, and holidays, please call the main number [phone] for emergent needs.

## 2018-10-17 NOTE — PROGRESS NOTES
Pt walk in to [facility]. Pt states he LM for Antonia this am.  Pt c/o feeling fatigued, 4 # weight loss. Pt still able to eat small amounts and drinking 1-2 glasses of water on his own per day. He remains active.   Reports 3-4 liquid stools per day and he is taking P

## 2018-10-17 NOTE — PROGRESS NOTES
Pt here for RN assessment and IVF.   Arrives Ambulating independently, accompanied by Other self            Frequency of blood return and site check throughout administration: Prior to administration and At completion of therapy   Discharged to [facility]

## 2018-10-19 NOTE — PROGRESS NOTES
Pt here for fluids.   Arrives Ambulating independently, accompanied by Spouse           Modifications in dose or schedule: No     Frequency of blood return and site check throughout administration: Prior to administration   Discharged to Home, Ambulating in

## 2018-10-25 NOTE — PATIENT INSTRUCTIONS
To reach Dr Susanna Richey nurse during business hours, please call [phone]. After hours, including weekends, evenings, and holidays, please call the main number [phone] for emergent needs.

## 2018-10-25 NOTE — PROGRESS NOTES
Patient is here for MD f/u and cycle 4 of chemo. Denies pain. Patient no longer on Fentanyl patch and Norco. Appetite is good. No nausea and vomiting. Bouts of diarrhea and constipation.         Education Record    Learner:  Patient and Spouse    Disease /

## 2018-10-26 PROBLEM — D64.81 ANEMIA DUE TO ANTINEOPLASTIC CHEMOTHERAPY: Status: ACTIVE | Noted: 2018-01-01

## 2018-10-26 PROBLEM — T45.1X5A ANEMIA DUE TO ANTINEOPLASTIC CHEMOTHERAPY: Status: ACTIVE | Noted: 2018-01-01

## 2018-10-26 NOTE — PROGRESS NOTES
[facility]    PATIENT'S NAME: Natasha Mercedes   ATTENDING PHYSICIAN: Geoff Larkin M.D.    PATIENT ACCOUNT #: [de-identified] LOCATION: [address] [place] [address] RECORD #: [id number] YOB: 1960   DATE OF SERVICE: 10/25/2018       CANCER ARNIE chlorpromazine 10 mg t.i.d. p.r.n., vitamin D 1000 units daily, Lomotil 1-2 tablets q.i.d. p.r.n., BLM mouthwash p.r.n., topical lidocaine and prilocaine cream p.r.n., loperamide 2-4 mg q.i.d. p.r.n., Uribel 1 capsule 3 times a day, olanzapine 5 mg nightly that he is responding, which is relatively likely given the drop in his CEA, we will continue for 4 more cycles and then we will determine whether any local therapies are possible at the sites of current disease involvement.   I will probably obtain a PET s

## 2018-10-27 NOTE — PROGRESS NOTES
Education Record    Learner:  Patient    Disease / Diagnosis: fulphila and pump d/c     Barriers / Limitations:  None   Comments:    Method:  Discussion   Comments:    General Topics:  Plan of care reviewed   Comments:    Outcome:  Shows understanding   Co

## 2018-11-01 NOTE — PROGRESS NOTES
Pt here for RN assessment and IVF. He continues to have diarrhea 1-2 times per day, taking Imodium and Lomotil alternating with relief. He is still going to work most days, working out and eating as best he can. He denies fevers or chills.   Does c/o some t

## 2018-11-01 NOTE — PROGRESS NOTES
Pt here for IV hydration and IV zofran/dex.   Arrives Ambulating independently, accompanied by Other self           Frequency of blood return and site check throughout administration: Prior to administration   Discharged to Home, Ambulating independently, a

## 2018-11-08 NOTE — PROGRESS NOTES
Pt here for C5D1 of Avastine/Folfox  Arrives Ambulating independently, accompanied by Spouse           Modifications in dose or schedule: No     Frequency of blood return and site check throughout administration: Prior to administration   Discharged to [facility]

## 2018-11-08 NOTE — PROGRESS NOTES
Patient is here for MD f/u and cycle 5 of chemo. Patient with c/o occasional diarrhea. Taking Imodium as needed. Ct scan was on Monday. Here to discuss these results. Appetite is good. Mild fatigue. Denies cough or SOB. Patient has a left ureteral stent.  H

## 2018-11-10 NOTE — PROGRESS NOTES
Education Record    Learner:  Patient    Disease / Diagnosis:met rectal ca    Barriers / Limitations:  None   Comments:    Method:  Discussion   Comments:    General Topics:  Medication, Side effects and symptom management and Plan of care reviewed   Farrah

## 2018-11-12 NOTE — TELEPHONE ENCOUNTER
Pt had pump d/c'd over the weekend. He states he would like to come in for fluids. He is eating and drinking. He states he had 10 episodes of diarrhea yesterday. He is taking lomotil. He states he has issues with diarrhea after every chemo infusion.

## 2018-11-20 NOTE — PROGRESS NOTES
[facility]    PATIENT'S NAME: Lucy Wiley   ATTENDING PHYSICIAN: LESLIE Briceno    PATIENT ACCOUNT #: [de-identified] LOCATION: [address] [place] [address] RECORD #: [id number] YOB: 1960   DATE OF SERVICE: 11/08/2018       C[facility] CENTER P weight is 164 pounds. Blood pressure is 130/85, pulse 66, respiratory rate 20, temperature 96.6. HEENT:  Unremarkable. He has pink conjunctivae. Anicteric sclerae. Pharynx without lesions.   LYMPHATICS:  He has no cervical, supraclavicular, or axilla been fairly good, he will follow through with Dr. Naty Tobin regarding the stent change. He has no limitations with regard to doing this.       Dictated By Elfego Darnell M.D.  d: 11/19/2018 58:07:66  t: 11/20/2018 73:01:57  Job [id number]  SJ/

## 2018-11-22 PROBLEM — C20 RECTAL CANCER METASTASIZED TO LIVER (HCC): Status: ACTIVE | Noted: 2017-08-28

## 2018-11-22 PROBLEM — R10.9 INTRACTABLE ABDOMINAL PAIN: Status: ACTIVE | Noted: 2018-01-01

## 2018-11-22 PROBLEM — C78.7 RECTAL CANCER METASTASIZED TO LIVER (HCC): Status: ACTIVE | Noted: 2017-08-28

## 2018-11-23 NOTE — PROGRESS NOTES
11/23/18 1535   Clinical Encounter Type   Visited With Patient   Sacramental Encounters   Sacrament of Sick-Anointing Visiting  anointed patient   The patient was seen by Franklin Nuñez.  Received prayer, Scripture, support and Sacrament

## 2018-11-23 NOTE — ED PROVIDER NOTES
Patient Seen in: [facility] Emergency Department    History   Patient presents with:  Abdomen/Flank Pain (GI/)    Stated Complaint: abd pain, n/v, rectal CA     HPI    59-year-old male with history of rectal cancer and left-sided hydronephrosis wi History    Tobacco Use      Smoking status: Never Smoker      Smokeless tobacco: Never Used    Alcohol use:  Yes      Alcohol/week: 0.0 oz      Comment: Occasional    Drug use: No      Review of Systems    Positive for stated complaint: abd pain, n/v, recta components:    RBC Morphology See morphology below (*)     Microcytosis Small (*)     Macrocytosis Small (*)     All other components within normal limits   CBC W/ DIFFERENTIAL - Abnormal; Notable for the following components:    RBC 4.17 (*)     HGB 12.9 a hx of rectal cancer with mets to liver. FINDINGS:  Evaluation of the visceral organs is limited due to the lack of intravenous contrast. LUNG BASE:  Scattered atelectasis.  LIVER:  Hepatic metastases are again noted and better characterized on the prio muscle, and within the liver. Precise measurements are precluded secondary to noncontrast technique. There is a soft tissue density lateral to the proximal descending colon which may represent inflammatory change or a new peritoneal metastasis.   Attentio

## 2018-11-23 NOTE — ED INITIAL ASSESSMENT (HPI)
Pt to er with family cc LLQ pain x two days with interm blood in urine x one week. N/v x one today. Increase in lethargy. Arrived wearing fentanyl 25 mcg/hr med patch   Last tylenol 1000mg at 12noon today.

## 2018-11-23 NOTE — ED NOTES
Accessed pt's port a cath at [facility] without difficulty. Pt tolerated well. Labs drawn and sent. NS infusing on pump. Pt updated on poc. Family remains at bs.

## 2018-11-23 NOTE — PLAN OF CARE
NURSING ADMISSION NOTE      Patient admitted via Cart  Oriented to room. Safety precautions initiated. Bed in low position. Call light in reach. Patient A+Ox4. C/o minimal pain 1/10 and declines any pain medication. IVF and abt per MAR.  Afebril

## 2018-11-23 NOTE — CONSULTS
Hematology-Oncology Consultation Note    Patient Name: Jonna Gisela   YOB: 1960   Medical Record Number: [id number]   CSN: [id number]   Consulting Physician: Wilman Berry MD  Date of Consultation: 11/23/2018     Reason for Consultation injury in HS   • OTHER SURGICAL HISTORY  09/01/2018    S/p cystoscopy, retrograde pyelogram, stent insertion, left   • PART REMOVAL COLON W END COLOSTOMY     • PORT, INDWELLING, IMP Right        Family Medical History:  Family History   Problem Relation Ag MG per tab 2 tablet 2 tablet Oral Q4H PRN   ondansetron HCl (ZOFRAN) injection 4 mg 4 mg Intravenous Q6H PRN   Metoclopramide HCl (REGLAN) injection 10 mg 10 mg Intravenous Q8H PRN   Prochlorperazine Edisylate (COMPAZINE) injection 5 mg 5 mg Intravenous Q8 08/07/2017 1133    HGB 11.9 (L) 11/23/2018 0613    HGB 12.9 (L) 11/22/2018 2036    HGB 13.0 11/08/2018 1145    Hemoglobin 16.1 08/07/2017 1133    HCT 36.9 (L) 11/23/2018 0613    HCT 38.4 11/22/2018 2036    HCT 40.8 11/08/2018 1145    Hematocrit 46.8 08/07/ 8.6 11/08/2018 1145    Albumin 3.1 11/22/2018 2036    Albumin 3.2 11/08/2018 1145    Albumin 3.0 (L) 10/25/2018 0956    Albumin 3.8 08/07/2017 1133    Sodium 141 11/23/2018 0613    Sodium 138 11/22/2018 2036    Sodium 141 11/08/2018 1145    Sodium 139 08/0

## 2018-11-23 NOTE — PROGRESS NOTES
[facility]  Progress Note     Costella Bevel Patient Status:  Inpatient    1960 MRN [id number]   [facility] 4NW-A Attending Chauncey Rice MD   Hosp Day # 1 PCP [address] [place] [address]     Chief Complaint: SHELL, UTI    S: Madeline hydronephrosis, improving  2. Acute kidney injury, suspect pre-renal given improvement with IVF alone  3. Psoas muscle mass  4. Rectal cancer with mets  5.  Alternating constipation / diarrhea      Plan:  Continue IVF  Continue IV abx  Follow-up UC  Monitor

## 2018-11-23 NOTE — PROGRESS NOTES
[facility] Urology   Progress Note  Mary Martínez Patient Status:  Inpatient    1960 MRN [id number]   [facility] 4NW-A Attending Arvin West MD   Hosp Day # 1 PCP SUSIE MISTRY, DO     Subjective:  Diane Ferris 11/22/2018    BUN 24 11/22/2018     11/22/2018    K 4.4 11/22/2018     11/22/2018    CO2 24.0 11/22/2018     11/22/2018    CA 8.5 11/22/2018    ALB 3.1 11/22/2018    ALKPHO 148 11/22/2018    BILT 0.2 11/22/2018    TP 6.9 11/22/2018    AS

## 2018-11-23 NOTE — H&P
[facility]  History and Physical     Juan Martínez Patient Status:  Emergency    1960 MRN [id number]   Location [address] [facility] Attending Benjamin Flanagan, *   Hosp Day # 0 PCP Derek Nascimento DO     Chief REMOVAL COLON W END COLOSTOMY     • PORT, INDWELLING, IMP Right        Social History:  reports that  has never smoked. he has never used smokeless tobacco. He reports that he drinks alcohol. He reports that he does not use drugs.     Family History:   Braxton 3.  HEENT: Normocephalic atraumatic. Moist mucous membranes. EOM-I. PERRLA. Anicteric. Neck: No lymphadenopathy. No JVD. No carotid bruits. Respiratory: Clear to auscultation bilaterally. No wheezes. No rhonchi.   Cardiovascular: S1, S2. Regular rate and

## 2018-11-23 NOTE — PLAN OF CARE
Afebrile, denies any burning sensations with urination, denies pain. Feels fatigued, patient is actively getting chemo for bladder cancer, last chemo was a week ago Thursday, to resume again next week.  Seen by , to continue with present antibiotics and f

## 2018-11-24 NOTE — PLAN OF CARE
GASTROINTESTINAL - ADULT    • Minimal or absence of nausea and vomiting Progressing    • Maintains adequate nutritional intake (undernourished) Progressing        GENITOURINARY - ADULT    • Absence of urinary retention Progressing          Patient A+Ox4.  D

## 2018-11-24 NOTE — DISCHARGE SUMMARY
[facility] [place] HOSPITALIST  DISCHARGE SUMMARY     [address] [facility] Patient Status:  Inpatient    1960 MRN [id number]   [facility] 4NW-A Attending Pema Tobar MD   [address] Day # 2 PCP SUSIE MISTRY,      Date of Admission: 2018  D Prescription details   cephALEXin 500 MG Caps  Commonly known as:  KEFLEX      Take 1 capsule (500 mg total) by mouth 4 (four) times daily for 10 days.    Stop taking on:  12/4/2018  Quantity:  40 capsule  Refills:  0        CONTINUE taking these medication TREATMENT VISIT FOLLOW-UP [3408] 15 min. [facility] in Nohemi Amaral MD    Location Instructions: Your appointment is scheduled at the [facility] in [facility]. The address is [address] , [facility].

## 2018-11-24 NOTE — PROGRESS NOTES
Heme/Onc Progress Note    Patient Name: Allen Morrison   YOB: 1960   Medical Record Number: [id number]   CSN: [id number]   Attending Physician: Latrice Mi M.D. Subjective:  Pain has resolved.  Wants to go home    Objective:    Vital Prochlorperazine Edisylate (COMPAZINE) injection 5 mg 5 mg Intravenous Q8H PRN   CefTRIAXone Sodium (ROCEPHIN) 1 g in sodium chloride 0.9 % 100 mL MBP/ADD-vantage 1 g Intravenous Q24H   diphenoxylate-atropine (LOMOTIL) 2.5-0.025 MG per tab 1-2 tablet 1-2

## 2018-11-24 NOTE — PROGRESS NOTES
11/23/18 1829   Clinical Encounter Type   Visited With Patient   Routine Visit Introduction   Continue Visiting No   Patient's Supportive Strategies/Resources  provided emotional support.    Patient Spiritual Encounters   Spiritual Assessment Com

## 2018-11-24 NOTE — PROGRESS NOTES
Unable to see urine culture results. I contacted micro lab and per discussion with tech, the order was cancelled 11/23 - they do not have any specimen to be run. Patient has been on abx, is afebile and labs had been improving (pending today).   Will treat

## 2018-11-24 NOTE — PROGRESS NOTES
[facility]    Progress Note     [address] Patient Status:  Inpatient    1960 MRN [id number]   [facility] 4NW-A Attending Dalila Ahumada, MD   Hosp Day # 2 PCP SUSIE MISTRY, DO     Subjective:   [address] is a(n)

## 2018-11-24 NOTE — PROGRESS NOTES
NURSING DISCHARGE NOTE    Discharged Home via Ambulatory. Accompanied by Family member  Belongings Taken by patient/family. Pt AOx4. No c/o pain. VSS. AVS, f/u's and med changes discussed. Demonstrated understanding. Port de-accessed.  Prescription

## 2018-11-24 NOTE — PAYOR COMM NOTE
--------------  ADMISSION REVIEW     Payor: [address] [facility] #:  [id number]  Authorization Number: [id number]    Admit date: 11/22/18  Admit time: 2358       Admitting Physician: Shadia Silva MD  Attending Physician:  Kat Matias Felicia Cartagena MD at [facility] ENDOSCOPY   • OTHER      colostomy reversal done 5/25/18   • OTHER SURGICAL HISTORY      left shoulder surgery football injury in HS   • OTHER SURGICAL HISTORY  09/01/2018    S/p cystoscopy, retrograde pyelogram, stent insertion, l Alkaline Phosphatase 148 (*)     A/G Ratio 0.8 (*)     All other components within normal limits   RBC MORPHOLOGY SCAN - Abnormal; Notable for the following components:    RBC Morphology See morphology below (*)     Microcytosis Small (*)     Macrocytosis [address] [place] [address] Attending José Yao, *   Hosp Day # 0  [facility],      Chief Complaint: Abdominal pain     History of Present Illness: Jassi Gentile is a 59year old male with rectal cancer with mets, Let deficits. CNII-XII grossly intact. Musculoskeletal: Moves all extremities. Extremities: No edema or cyanosis. Integument: No rashes or lesions. Psychiatric: Appropriate mood and affect.       Diagnostic Data:      Labs:  Recent Labs   Lab  11/22/18   2 Signs: BP 99/61 (BP Location: Right arm)   Pulse 63   Temp 97.9 °F (36.6 °C) (Oral)   Resp 18   Ht 1.778 m (5' 10\")   Wt 74.8 kg (165 lb)   SpO2 95%   BMI 23.68 kg/m²     Impression & Plan:   1. Metastatic rectal cancer- on FOLFIRI-Avastin.  Difficult to improving (pending today). Will treat empirically, not sure there is utility in sending another sample as patient is already on abx, but will run another.   Trung MARIA         Date Action Dose Route User    11/24/2018 0033 New Bag 1 g Intravenous BREEZY Flynn

## 2018-11-27 NOTE — ANESTHESIA PREPROCEDURE EVALUATION
PRE-OP EVALUATION    Patient Name: Gavino Schaefer    Pre-op Diagnosis: Hydronephrosis, unspecified hydronephrosis type [N13.30]    Procedure(s):  CYSTOSCOPY, LEFT URETERAL STENT REMOVAL POSSIBLE EXCHANGE     Surgeon(s) and Role:     Dmitry Benton MD Endo/Other    Negative endo/other ROS.                               Pulmonary      (+) asthma                     Neuro/Psych      (+) depression  (+) anxiety           (+) headaches                 Past Surgical History:   Procedure Laterality Date   • CO signed without further questions.

## 2018-11-27 NOTE — H&P
History & Physical Examination    Patient Name: Dioni Connor  MRN: [id number]  CSN: [id number]  YOB: 1960    Diagnosis: left hydronephrosis, left ureteral obstruction    Present Illness:  left hydronephrosis,  left ureteral obstruction Occasional      SYSTEM Check if Review is Normal Check if Physical Exam is Normal If not normal, please explain:   HEENT [ x] [x ]    NECK & BACK [x ] [ x]    HEART [x ] [x ]    LUNGS [x ] [x ]    ABDOMEN [ x] [x ]    UROGENITAL [x ] [x ]    EXTREMITIES [x

## 2018-11-28 NOTE — ANESTHESIA POSTPROCEDURE EVALUATION
[address] Patient Status:  Hospital Outpatient Surgery   Age/Gender 59year old male MRN [id number]   [facility] SURGERY Attending Lucio Stapleton MD   Hosp Day # 0 PCP SUSIE MISTRY,        Anesthesia Post-op

## 2018-11-28 NOTE — BRIEF OP NOTE
Pre-Operative Diagnosis: Hydronephrosis, unspecified hydronephrosis type [N13.30]     Post-Operative Diagnosis: LEFT PROXIMAL URETERAL obstruction Hydronephrosis, unspecified hydronephrosis type [N13.30]      Procedure Performed:   Procedure(s):  CYSTOSCOP

## 2018-11-28 NOTE — OPERATIVE REPORT
[facility]    PATIENT'S NAME: Lola Clementerobert   ATTENDING PHYSICIAN: John Castillo M.D. OPERATING PHYSICIAN: John Castillo M.D.    PATIENT ACCOUNT#:   [de-identified]    LOCATION:  [address]  MEDICAL RECORD #:   [id number]       DATE [address] proximal ureter. The distal 2/3 of the ureter had a normal caliber. The proximal third was very narrowed and tortuous. The intrarenal collecting system was moderately dilated.   A 0.038 Glidewire was then introduced through the open-ended catheter into t

## 2018-11-29 NOTE — PROGRESS NOTES
Patient is here for MD f/u for rectal cancer and cycle 6 of chemo. Patient was hospitalized over Thanksgiving with a UTI. Left ureteral stent replaced on Tuesday. Pain is better now. Continues on Cephalexin. Appetite is good. Mild fatigue.            Roma Marcialco

## 2018-11-29 NOTE — PROGRESS NOTES
Pt here for C6D1.   Arrives Ambulating independently, accompanied by            Modifications in dose or schedule: No     Frequency of blood return and site check throughout administration: Prior to administration   Discharged to Home, Ambulating independen

## 2018-12-03 NOTE — PROGRESS NOTES
Education Record  Learner:  Patient  Disease / Diagnosis:   Metastatic rectal cancer; dehydration; n/v  Barriers / Limitations:  None  Method:  Printed material and Reinforcement  General Topics:  Plan of care reviewed; pt has been able to eat and drink so

## 2018-12-04 NOTE — PROGRESS NOTES
[facility]    PATIENT'S NAME: Avelina Tashavaldemar   ATTENDING PHYSICIAN: LESLIE Lux    PATIENT ACCOUNT #: [de-identified] LOCATION: [address] [place] [address] RECORD #: [id number] YOB: 1960   DATE OF SERVICE: 11/29/2018       [facility] CENTER P vitamin D 1000 units daily, Lomotil 1 to 2 tablets q.i.d. p.r.n., Uribel p.r.n., Zofran 8 mg q.8 h. p.r.n., oxybutynin extended release 10 mg daily, prochlorperazine 10 mg q.6 h. p.r.n.     PHYSICAL EXAMINATION:    GENERAL:  He is a well-developed, thin mal

## 2018-12-06 PROBLEM — E87.6 HYPOKALEMIA: Status: ACTIVE | Noted: 2018-01-01

## 2018-12-07 NOTE — ED PROVIDER NOTES
Patient Seen in: [facility] Emergency Department    History   Patient presents with:  Abdomen/Flank Pain (GI/)  Nausea/Vomiting/Diarrhea (gastrointestinal)    Stated Complaint: abd pain and cramping, diarrhea x1 week; history of rectal cancer    H Comment: Occasional    Drug use: No      Review of Systems    Positive for stated complaint: abd pain and cramping, diarrhea x1 week; history of rectal cancer  Other systems are as noted in HPI. Constitutional and vital signs reviewed.       All other s following orders were created for panel order CBC WITH DIFFERENTIAL WITH PLATELET.   Procedure                               Abnormality         Status                     ---------                               -----------         ------ left ureter which measures approximately 2.4 x 1.9 cm. No significant     change from previous. Surgical clips are seen in this location. The     findings likely related to a     metastatic lesion. ADRENALS:  No mass or enlargement.       LIVER:  Hypo lesions. PELVIC ORGANS:  Prostate gland is enlarged. Prostate calcifications are     noted. LUNG BASES:  Mild dependent atelectasis. OTHER:  Negative.           =====    CONCLUSION:      1. Hepatic metastatic disease noted.   There is some appare unfortunately there is no urine culture from the last admission to help direct antibiotic care but given that he took more than 14 days of Keflex and still seems to have a UTI even though it is no longer a significant pyelonephritis, going to switch to cip

## 2018-12-07 NOTE — ED INITIAL ASSESSMENT (HPI)
Left sided abdominal pain since Monday with diarrhea and cramping. Denies N/V or fever. Hx of rectal CA with active chemo, last treatment last Thursday.  Was treated with IVF at cancer center on MOnday

## 2018-12-12 NOTE — PROGRESS NOTES
Sent to scan but never processed for medical records release. Received a call from [employer] looking for records. Printed and faxed last years worth of records to [phone] on 12/5/18. Conf receipt of records on 12/12/18.

## 2018-12-13 NOTE — PROGRESS NOTES
Pt here for C7D1.   Arrives Ambulating independently, accompanied by Spouse           Modifications in dose or schedule: No     Frequency of blood return and site check throughout administration: Prior to administration and Prior to each drug   Discharged t

## 2018-12-13 NOTE — PROGRESS NOTES
Outpatient Oncology Care Plan  Problem list:  diarrhea  constipation  fatigue  nausea    Problems related to:    chemotherapy  disease/disease progression    Interventions:  provided general teaching    Expected outcomes:  symptoms relieved/minimized  unde

## 2018-12-18 NOTE — PROGRESS NOTES
Patient notified via [address] Result has been released, patient account is currently active. Us completed early. Was to be completed in 2 months. Has RBUS scheduled with Dr. Earl Soto  Future Appointments  12/20/2018 5:30 PM    [facility] CT MAIN RM4             [facility] CT

## 2018-12-18 NOTE — PROGRESS NOTES
[facility]    PATIENT'S NAME: Merline Jazmyn   ATTENDING PHYSICIAN: Ofelia Ahn M.D.    PATIENT ACCOUNT #: [de-identified] LOCATION: [address] [place] [address] RECORD #: [id number] YOB: 1960   DATE OF SERVICE: 12/13/2018       CANCER ARNIE capsule 3 times a day p.r.n., ondansetron 4 mg q.4 h. p.r.n. nausea, oxybutynin extended release 10 mg daily, prochlorperazine 10 mg q.6 h. p.r.n., and tramadol 50 mg q.4 h. p.r.n.     PHYSICAL EXAMINATION:    GENERAL:  He is a well-developed, well-nourishe clinical trial opportunities for him in the future. Dictated By Marimar Daniel M.D.  d: 12/17/2018 16:39:16  t: 12/18/2018 06:16:54  [facility] [id number]  /    cc: TITUS Pineda M.D. Dr. Durell Sermons Dr. Lamar Kil

## 2018-12-18 NOTE — TELEPHONE ENCOUNTER
Sister called that Echo Rivers was having abdominal pain and blood in his urine. Attempted to call Echo Rivers and got his VM, left message to call us back.  Called sister and left VM for her that I called and Echo Rivers did not answer and that I left message for him to piotr

## 2018-12-21 NOTE — TELEPHONE ENCOUNTER
Left a message with patient - on his voicemail. CT shows worsening in the liver. Other sites are stable. I believe he is seeing Dr Sandy Mtz soon. Would be a candidate for irinotecan, vemurafenib and cetuximab unless Dr Sandy Mtz has abetter option for him.

## 2018-12-27 NOTE — PROGRESS NOTES
ANP Visit Note    Patient Name: Danika Marquez   YOB: 1960   Medical Record Number: [id number]   CSN: [id number]   Date of visit: 12/27/2018       Chief Complaint/Reason for Visit:  Patient presents with:  Chemotherapy  Metastatic Rectal can Anemia due to antineoplastic chemotherapy     Intractable abdominal pain     SHELL (acute kidney injury) ([address]     Pyelonephritis     Acute pyelonephritis     Rectal cancer ([address]     Hypokalemia       Medical History:  Past Medical History:   Diagnosis Date History:  Social History    Socioeconomic History      Marital status:       Spouse name: Not on file      Number of children: Not on file      Years of education: Not on file      Highest education level: Not on file    Social Needs      Financial mouth every 8 (eight) hours as needed for Nausea., Disp: 20 tablet, Rfl: 3  •  Prochlorperazine Maleate (COMPAZINE) 10 mg tablet, Take 1 tablet (10 mg total) by mouth every 6 (six) hours as needed for Nausea., Disp: 30 tablet, Rfl: 3  •  Loperamide HCl (IM American 72 >=60    GFR, -American 83 >=60    AST 29 15 - 41 U/L    Alt 31 17 - 63 U/L    Alkaline Phosphatase 181 (H) 45 - 117 U/L    Bilirubin, Total 0.2 0.1 - 2.0 mg/dL    Total Protein 7.3 6.4 - 8.2 g/dL    Albumin 3.3 3.1 - 4.5 g/dL    Globulin scanning through the chest, abdomen, and pelvis was performed. Dose reduction techniques were used.  Dose information is transmitted to the ACR ([facility]) NRDR ([address]) which   includes the Dose Index Registry No bowel obstruction, ascites or free air. Moderate stool throughout the colon. No sign of colitis or diverticulitis     ABDOMINAL WALL:  Unremarkable. URINARY BLADDER:  Unremarkable. PELVIC NODES:  Unremarkable.      PELVIC ORGANS:  Left presacr Hydronephrosis: stent in place, due for stent change next month. 5. Reactive depression: patient reluctant to take anything, discussed importance of support, he has a strong licha and large supportive family.        A total of  50  minutes were spent with

## 2018-12-27 NOTE — PROGRESS NOTES
Patient is here for APN assessment. Patient had a visit with Dr Linda Benitez @ [facility] to discuss other treatment options. Patient is here to further discuss. Patient states he has been having some hematuria and lower back pain.  Patient has a stent in place and s

## 2018-12-27 NOTE — PROGRESS NOTES
Pt here for C1D1.   Arrives Ambulating independently, accompanied by Self           Modifications in dose or schedule: No     Frequency of blood return and site check throughout administration: Prior to administration   Discharged to Home, Ambulating indepe

## 2019-01-01 ENCOUNTER — HOSPITAL ENCOUNTER (INPATIENT)
Facility: HOSPITAL | Age: 59
LOS: 4 days | Discharge: HOME HEALTH | DRG: 375 | End: 2019-01-01
Attending: HOSPITALIST | Admitting: INTERNAL MEDICINE
Payer: COMMERCIAL

## 2019-01-01 ENCOUNTER — HOSPITAL ENCOUNTER (OUTPATIENT)
Facility: HOSPITAL | Age: 59
Setting detail: HOSPITAL OUTPATIENT SURGERY
Discharge: HOME | End: 2019-01-01
Attending: UROLOGY | Admitting: UROLOGY
Payer: COMMERCIAL

## 2019-01-01 ENCOUNTER — APPOINTMENT (OUTPATIENT)
Dept: GENERAL RADIOLOGY | Facility: HOSPITAL | Age: 59
DRG: 392 | End: 2019-01-01
Attending: INTERNAL MEDICINE
Payer: COMMERCIAL

## 2019-01-01 ENCOUNTER — TELEPHONE (OUTPATIENT)
Dept: HEMATOLOGY/ONCOLOGY | Facility: HOSPITAL | Age: 59
End: 2019-01-01

## 2019-01-01 ENCOUNTER — ANESTHESIA EVENT (OUTPATIENT)
Dept: SURGERY | Facility: HOSPITAL | Age: 59
End: 2019-01-01

## 2019-01-01 ENCOUNTER — OFFICE VISIT (OUTPATIENT)
Dept: HEMATOLOGY/ONCOLOGY | Age: 59
End: 2019-01-01
Attending: INTERNAL MEDICINE
Payer: COMMERCIAL

## 2019-01-01 ENCOUNTER — OFFICE VISIT (OUTPATIENT)
Dept: HEMATOLOGY/ONCOLOGY | Facility: HOSPITAL | Age: 59
End: 2019-01-01
Attending: INTERNAL MEDICINE
Payer: COMMERCIAL

## 2019-01-01 ENCOUNTER — APPOINTMENT (OUTPATIENT)
Dept: GENERAL RADIOLOGY | Facility: HOSPITAL | Age: 59
End: 2019-01-01
Attending: UROLOGY
Payer: COMMERCIAL

## 2019-01-01 ENCOUNTER — ANESTHESIA (OUTPATIENT)
Dept: SURGERY | Facility: HOSPITAL | Age: 59
End: 2019-01-01
Payer: COMMERCIAL

## 2019-01-01 ENCOUNTER — SOCIAL WORK SERVICES (OUTPATIENT)
Dept: HEMATOLOGY/ONCOLOGY | Facility: HOSPITAL | Age: 59
End: 2019-01-01

## 2019-01-01 ENCOUNTER — ANESTHESIA (OUTPATIENT)
Dept: SURGERY | Facility: HOSPITAL | Age: 59
End: 2019-01-01

## 2019-01-01 ENCOUNTER — APPOINTMENT (OUTPATIENT)
Dept: GENERAL RADIOLOGY | Facility: HOSPITAL | Age: 59
DRG: 336 | End: 2019-01-01
Attending: EMERGENCY MEDICINE
Payer: COMMERCIAL

## 2019-01-01 ENCOUNTER — LAB ENCOUNTER (OUTPATIENT)
Dept: LAB | Age: 59
End: 2019-01-01
Attending: FAMILY MEDICINE
Payer: COMMERCIAL

## 2019-01-01 ENCOUNTER — APPOINTMENT (OUTPATIENT)
Dept: HEMATOLOGY/ONCOLOGY | Age: 59
End: 2019-01-01
Attending: INTERNAL MEDICINE
Payer: COMMERCIAL

## 2019-01-01 ENCOUNTER — HOSPITAL ENCOUNTER (EMERGENCY)
Facility: HOSPITAL | Age: 59
Discharge: HOME | End: 2019-01-01
Attending: EMERGENCY MEDICINE
Payer: COMMERCIAL

## 2019-01-01 ENCOUNTER — MED REC SCAN ONLY (OUTPATIENT)
Dept: FAMILY MEDICINE CLINIC | Facility: CLINIC | Age: 59
End: 2019-01-01

## 2019-01-01 ENCOUNTER — HOSPITAL ENCOUNTER (OUTPATIENT)
Dept: ULTRASOUND IMAGING | Age: 59
Discharge: HOME | End: 2019-01-01
Attending: CLINICAL NURSE SPECIALIST
Payer: COMMERCIAL

## 2019-01-01 ENCOUNTER — APPOINTMENT (OUTPATIENT)
Dept: GENERAL RADIOLOGY | Facility: HOSPITAL | Age: 59
DRG: 336 | End: 2019-01-01
Attending: INTERNAL MEDICINE
Payer: COMMERCIAL

## 2019-01-01 ENCOUNTER — ANESTHESIA EVENT (OUTPATIENT)
Dept: SURGERY | Facility: HOSPITAL | Age: 59
End: 2019-01-01
Payer: COMMERCIAL

## 2019-01-01 ENCOUNTER — TELEPHONE (OUTPATIENT)
Dept: FAMILY MEDICINE CLINIC | Facility: CLINIC | Age: 59
End: 2019-01-01

## 2019-01-01 ENCOUNTER — MED REC SCAN ONLY (OUTPATIENT)
Dept: HEMATOLOGY/ONCOLOGY | Facility: HOSPITAL | Age: 59
End: 2019-01-01

## 2019-01-01 ENCOUNTER — OFFICE VISIT (OUTPATIENT)
Dept: FAMILY MEDICINE CLINIC | Facility: CLINIC | Age: 59
End: 2019-01-01
Payer: COMMERCIAL

## 2019-01-01 ENCOUNTER — MOBILE ENCOUNTER (OUTPATIENT)
Dept: HEMATOLOGY/ONCOLOGY | Facility: HOSPITAL | Age: 59
End: 2019-01-01

## 2019-01-01 ENCOUNTER — APPOINTMENT (OUTPATIENT)
Dept: CT IMAGING | Facility: HOSPITAL | Age: 59
DRG: 336 | End: 2019-01-01
Attending: INTERNAL MEDICINE
Payer: COMMERCIAL

## 2019-01-01 ENCOUNTER — APPOINTMENT (OUTPATIENT)
Dept: GENERAL RADIOLOGY | Facility: HOSPITAL | Age: 59
DRG: 375 | End: 2019-01-01
Attending: UROLOGY
Payer: COMMERCIAL

## 2019-01-01 ENCOUNTER — HOSPITAL ENCOUNTER (INPATIENT)
Facility: HOSPITAL | Age: 59
LOS: 1 days | Discharge: HOME | DRG: 392 | End: 2019-01-01
Attending: EMERGENCY MEDICINE | Admitting: HOSPITALIST
Payer: COMMERCIAL

## 2019-01-01 ENCOUNTER — NURSE ONLY (OUTPATIENT)
Dept: HEMATOLOGY/ONCOLOGY | Age: 59
End: 2019-01-01
Attending: INTERNAL MEDICINE
Payer: COMMERCIAL

## 2019-01-01 ENCOUNTER — APPOINTMENT (OUTPATIENT)
Dept: INTERVENTIONAL RADIOLOGY/VASCULAR | Facility: HOSPITAL | Age: 59
DRG: 375 | End: 2019-01-01
Attending: UROLOGY
Payer: COMMERCIAL

## 2019-01-01 ENCOUNTER — OFFICE VISIT (OUTPATIENT)
Dept: HEMATOLOGY/ONCOLOGY | Age: 59
End: 2019-01-01
Attending: CLINICAL NURSE SPECIALIST
Payer: COMMERCIAL

## 2019-01-01 ENCOUNTER — HOSPITAL ENCOUNTER (OUTPATIENT)
Dept: CT IMAGING | Facility: HOSPITAL | Age: 59
Discharge: HOME | End: 2019-01-01
Attending: INTERNAL MEDICINE
Payer: COMMERCIAL

## 2019-01-01 ENCOUNTER — PATIENT OUTREACH (OUTPATIENT)
Dept: CASE MANAGEMENT | Age: 59
End: 2019-01-01

## 2019-01-01 ENCOUNTER — APPOINTMENT (OUTPATIENT)
Dept: CT IMAGING | Facility: HOSPITAL | Age: 59
DRG: 392 | End: 2019-01-01
Attending: EMERGENCY MEDICINE
Payer: COMMERCIAL

## 2019-01-01 ENCOUNTER — APPOINTMENT (OUTPATIENT)
Dept: INTERVENTIONAL RADIOLOGY/VASCULAR | Facility: HOSPITAL | Age: 59
End: 2019-01-01
Attending: INTERNAL MEDICINE
Payer: COMMERCIAL

## 2019-01-01 ENCOUNTER — HOSPITAL ENCOUNTER (OUTPATIENT)
Dept: GENERAL RADIOLOGY | Facility: HOSPITAL | Age: 59
Discharge: HOME | End: 2019-01-01
Attending: OBSTETRICS & GYNECOLOGY
Payer: COMMERCIAL

## 2019-01-01 ENCOUNTER — HOSPITAL ENCOUNTER (OUTPATIENT)
Dept: CT IMAGING | Age: 59
Discharge: HOME | End: 2019-01-01
Attending: INTERNAL MEDICINE
Payer: COMMERCIAL

## 2019-01-01 ENCOUNTER — HOSPITAL ENCOUNTER (INPATIENT)
Facility: HOSPITAL | Age: 59
LOS: 10 days | Discharge: HOME HEALTH | DRG: 336 | End: 2019-01-01
Attending: EMERGENCY MEDICINE | Admitting: INTERNAL MEDICINE
Payer: COMMERCIAL

## 2019-01-01 ENCOUNTER — HOSPITAL ENCOUNTER (OUTPATIENT)
Dept: ULTRASOUND IMAGING | Facility: HOSPITAL | Age: 59
Discharge: HOME | End: 2019-01-01
Attending: OBSTETRICS & GYNECOLOGY
Payer: COMMERCIAL

## 2019-01-01 ENCOUNTER — ANESTHESIA (OUTPATIENT)
Dept: SURGERY | Facility: HOSPITAL | Age: 59
DRG: 336 | End: 2019-01-01
Payer: COMMERCIAL

## 2019-01-01 ENCOUNTER — APPOINTMENT (OUTPATIENT)
Dept: LAB | Facility: HOSPITAL | Age: 59
End: 2019-01-01
Attending: INTERNAL MEDICINE
Payer: COMMERCIAL

## 2019-01-01 ENCOUNTER — HOSPITAL ENCOUNTER (OUTPATIENT)
Facility: HOSPITAL | Age: 59
Setting detail: OBSERVATION
Discharge: HOSPICE | End: 2019-01-01
Attending: HOSPITALIST | Admitting: HOSPITALIST
Payer: COMMERCIAL

## 2019-01-01 ENCOUNTER — LAB ENCOUNTER (OUTPATIENT)
Dept: LAB | Facility: HOSPITAL | Age: 59
End: 2019-01-01
Attending: OBSTETRICS & GYNECOLOGY
Payer: COMMERCIAL

## 2019-01-01 ENCOUNTER — ANESTHESIA EVENT (OUTPATIENT)
Dept: SURGERY | Facility: HOSPITAL | Age: 59
DRG: 336 | End: 2019-01-01
Payer: COMMERCIAL

## 2019-01-01 ENCOUNTER — TELEPHONE (OUTPATIENT)
Dept: SURGERY | Facility: CLINIC | Age: 59
End: 2019-01-01

## 2019-01-01 VITALS
SYSTOLIC BLOOD PRESSURE: 136 MMHG | HEART RATE: 103 BPM | TEMPERATURE: 97 F | RESPIRATION RATE: 18 BRPM | DIASTOLIC BLOOD PRESSURE: 95 MMHG | OXYGEN SATURATION: 99 %

## 2019-01-01 VITALS
OXYGEN SATURATION: 98 % | DIASTOLIC BLOOD PRESSURE: 94 MMHG | RESPIRATION RATE: 18 BRPM | WEIGHT: 126.31 LBS | BODY MASS INDEX: 18.08 KG/M2 | HEART RATE: 96 BPM | SYSTOLIC BLOOD PRESSURE: 153 MMHG | TEMPERATURE: 97 F | HEIGHT: 70 IN

## 2019-01-01 VITALS
RESPIRATION RATE: 20 BRPM | HEART RATE: 96 BPM | TEMPERATURE: 100 F | OXYGEN SATURATION: 99 % | SYSTOLIC BLOOD PRESSURE: 123 MMHG | DIASTOLIC BLOOD PRESSURE: 81 MMHG

## 2019-01-01 VITALS
BODY MASS INDEX: 18 KG/M2 | WEIGHT: 122.5 LBS | HEART RATE: 125 BPM | RESPIRATION RATE: 20 BRPM | TEMPERATURE: 98 F | OXYGEN SATURATION: 96 %

## 2019-01-01 VITALS
OXYGEN SATURATION: 100 % | SYSTOLIC BLOOD PRESSURE: 152 MMHG | WEIGHT: 162.5 LBS | BODY MASS INDEX: 23 KG/M2 | HEART RATE: 90 BPM | TEMPERATURE: 97 F | RESPIRATION RATE: 18 BRPM | DIASTOLIC BLOOD PRESSURE: 89 MMHG

## 2019-01-01 VITALS
WEIGHT: 151 LBS | BODY MASS INDEX: 21.62 KG/M2 | HEIGHT: 70 IN | RESPIRATION RATE: 16 BRPM | OXYGEN SATURATION: 100 % | SYSTOLIC BLOOD PRESSURE: 148 MMHG | HEART RATE: 80 BPM | TEMPERATURE: 96 F | DIASTOLIC BLOOD PRESSURE: 97 MMHG

## 2019-01-01 VITALS
OXYGEN SATURATION: 98 % | TEMPERATURE: 99 F | RESPIRATION RATE: 18 BRPM | SYSTOLIC BLOOD PRESSURE: 118 MMHG | DIASTOLIC BLOOD PRESSURE: 76 MMHG | HEART RATE: 87 BPM | WEIGHT: 139.5 LBS | BODY MASS INDEX: 20 KG/M2

## 2019-01-01 VITALS
WEIGHT: 132 LBS | RESPIRATION RATE: 18 BRPM | DIASTOLIC BLOOD PRESSURE: 71 MMHG | OXYGEN SATURATION: 100 % | HEART RATE: 107 BPM | HEIGHT: 70 IN | BODY MASS INDEX: 18.9 KG/M2 | SYSTOLIC BLOOD PRESSURE: 124 MMHG

## 2019-01-01 VITALS
RESPIRATION RATE: 18 BRPM | SYSTOLIC BLOOD PRESSURE: 119 MMHG | WEIGHT: 139.5 LBS | DIASTOLIC BLOOD PRESSURE: 75 MMHG | OXYGEN SATURATION: 98 % | HEIGHT: 70 IN | HEART RATE: 84 BPM | TEMPERATURE: 99 F | BODY MASS INDEX: 19.97 KG/M2

## 2019-01-01 VITALS
DIASTOLIC BLOOD PRESSURE: 78 MMHG | TEMPERATURE: 98 F | HEIGHT: 69 IN | SYSTOLIC BLOOD PRESSURE: 122 MMHG | WEIGHT: 163 LBS | HEART RATE: 98 BPM | RESPIRATION RATE: 18 BRPM | BODY MASS INDEX: 24.14 KG/M2

## 2019-01-01 VITALS
OXYGEN SATURATION: 100 % | HEART RATE: 80 BPM | WEIGHT: 124.38 LBS | RESPIRATION RATE: 16 BRPM | HEIGHT: 70 IN | TEMPERATURE: 98 F | DIASTOLIC BLOOD PRESSURE: 73 MMHG | BODY MASS INDEX: 17.81 KG/M2 | SYSTOLIC BLOOD PRESSURE: 124 MMHG

## 2019-01-01 VITALS
DIASTOLIC BLOOD PRESSURE: 69 MMHG | OXYGEN SATURATION: 100 % | SYSTOLIC BLOOD PRESSURE: 102 MMHG | WEIGHT: 129 LBS | HEART RATE: 117 BPM | TEMPERATURE: 99 F | RESPIRATION RATE: 16 BRPM | BODY MASS INDEX: 19 KG/M2

## 2019-01-01 VITALS
RESPIRATION RATE: 18 BRPM | DIASTOLIC BLOOD PRESSURE: 83 MMHG | BODY MASS INDEX: 24 KG/M2 | HEART RATE: 63 BPM | WEIGHT: 167.5 LBS | SYSTOLIC BLOOD PRESSURE: 149 MMHG | TEMPERATURE: 96 F | OXYGEN SATURATION: 100 %

## 2019-01-01 VITALS — DIASTOLIC BLOOD PRESSURE: 86 MMHG | SYSTOLIC BLOOD PRESSURE: 121 MMHG | HEART RATE: 93 BPM | TEMPERATURE: 97 F

## 2019-01-01 VITALS
OXYGEN SATURATION: 100 % | BODY MASS INDEX: 20 KG/M2 | RESPIRATION RATE: 16 BRPM | HEART RATE: 103 BPM | TEMPERATURE: 99 F | WEIGHT: 139.69 LBS

## 2019-01-01 VITALS
TEMPERATURE: 97 F | SYSTOLIC BLOOD PRESSURE: 120 MMHG | RESPIRATION RATE: 20 BRPM | OXYGEN SATURATION: 99 % | DIASTOLIC BLOOD PRESSURE: 86 MMHG | HEART RATE: 96 BPM

## 2019-01-01 VITALS
RESPIRATION RATE: 18 BRPM | OXYGEN SATURATION: 100 % | BODY MASS INDEX: 20 KG/M2 | SYSTOLIC BLOOD PRESSURE: 107 MMHG | TEMPERATURE: 97 F | HEART RATE: 77 BPM | WEIGHT: 142 LBS | DIASTOLIC BLOOD PRESSURE: 71 MMHG

## 2019-01-01 VITALS
SYSTOLIC BLOOD PRESSURE: 100 MMHG | RESPIRATION RATE: 16 BRPM | BODY MASS INDEX: 21 KG/M2 | DIASTOLIC BLOOD PRESSURE: 69 MMHG | TEMPERATURE: 99 F | HEART RATE: 125 BPM | OXYGEN SATURATION: 100 % | WEIGHT: 149 LBS

## 2019-01-01 VITALS
BODY MASS INDEX: 16 KG/M2 | HEART RATE: 98 BPM | RESPIRATION RATE: 16 BRPM | TEMPERATURE: 98 F | SYSTOLIC BLOOD PRESSURE: 118 MMHG | OXYGEN SATURATION: 93 % | DIASTOLIC BLOOD PRESSURE: 78 MMHG | WEIGHT: 114 LBS

## 2019-01-01 VITALS
WEIGHT: 130.5 LBS | RESPIRATION RATE: 16 BRPM | SYSTOLIC BLOOD PRESSURE: 137 MMHG | TEMPERATURE: 99 F | BODY MASS INDEX: 19 KG/M2 | HEART RATE: 83 BPM | OXYGEN SATURATION: 100 % | DIASTOLIC BLOOD PRESSURE: 85 MMHG

## 2019-01-01 VITALS
OXYGEN SATURATION: 98 % | HEART RATE: 93 BPM | SYSTOLIC BLOOD PRESSURE: 106 MMHG | RESPIRATION RATE: 18 BRPM | TEMPERATURE: 98 F | DIASTOLIC BLOOD PRESSURE: 75 MMHG

## 2019-01-01 VITALS
OXYGEN SATURATION: 100 % | RESPIRATION RATE: 16 BRPM | BODY MASS INDEX: 19 KG/M2 | HEART RATE: 101 BPM | DIASTOLIC BLOOD PRESSURE: 82 MMHG | WEIGHT: 131 LBS | TEMPERATURE: 98 F | SYSTOLIC BLOOD PRESSURE: 120 MMHG

## 2019-01-01 VITALS
TEMPERATURE: 98 F | BODY MASS INDEX: 21 KG/M2 | RESPIRATION RATE: 16 BRPM | SYSTOLIC BLOOD PRESSURE: 111 MMHG | OXYGEN SATURATION: 97 % | DIASTOLIC BLOOD PRESSURE: 75 MMHG | HEART RATE: 104 BPM | WEIGHT: 149 LBS

## 2019-01-01 VITALS
HEIGHT: 70 IN | RESPIRATION RATE: 16 BRPM | WEIGHT: 132 LBS | SYSTOLIC BLOOD PRESSURE: 109 MMHG | TEMPERATURE: 98 F | BODY MASS INDEX: 18.9 KG/M2 | OXYGEN SATURATION: 98 % | HEART RATE: 74 BPM | DIASTOLIC BLOOD PRESSURE: 70 MMHG

## 2019-01-01 VITALS
HEART RATE: 114 BPM | TEMPERATURE: 99 F | OXYGEN SATURATION: 98 % | RESPIRATION RATE: 20 BRPM | SYSTOLIC BLOOD PRESSURE: 118 MMHG | DIASTOLIC BLOOD PRESSURE: 80 MMHG

## 2019-01-01 VITALS
TEMPERATURE: 98 F | BODY MASS INDEX: 18.18 KG/M2 | HEART RATE: 107 BPM | WEIGHT: 127 LBS | OXYGEN SATURATION: 98 % | HEIGHT: 70 IN | RESPIRATION RATE: 12 BRPM | DIASTOLIC BLOOD PRESSURE: 80 MMHG | SYSTOLIC BLOOD PRESSURE: 130 MMHG

## 2019-01-01 VITALS
RESPIRATION RATE: 16 BRPM | BODY MASS INDEX: 23 KG/M2 | TEMPERATURE: 99 F | OXYGEN SATURATION: 96 % | DIASTOLIC BLOOD PRESSURE: 98 MMHG | WEIGHT: 159.31 LBS | SYSTOLIC BLOOD PRESSURE: 140 MMHG | HEART RATE: 95 BPM

## 2019-01-01 VITALS
SYSTOLIC BLOOD PRESSURE: 121 MMHG | TEMPERATURE: 97 F | DIASTOLIC BLOOD PRESSURE: 69 MMHG | WEIGHT: 138.88 LBS | RESPIRATION RATE: 18 BRPM | BODY MASS INDEX: 20 KG/M2 | OXYGEN SATURATION: 100 % | HEART RATE: 102 BPM

## 2019-01-01 VITALS
HEART RATE: 91 BPM | BODY MASS INDEX: 20 KG/M2 | TEMPERATURE: 97 F | SYSTOLIC BLOOD PRESSURE: 122 MMHG | RESPIRATION RATE: 18 BRPM | WEIGHT: 140.5 LBS | DIASTOLIC BLOOD PRESSURE: 87 MMHG | OXYGEN SATURATION: 100 %

## 2019-01-01 VITALS
HEART RATE: 97 BPM | SYSTOLIC BLOOD PRESSURE: 130 MMHG | BODY MASS INDEX: 18 KG/M2 | DIASTOLIC BLOOD PRESSURE: 90 MMHG | RESPIRATION RATE: 20 BRPM | WEIGHT: 122 LBS | OXYGEN SATURATION: 92 % | TEMPERATURE: 97 F

## 2019-01-01 VITALS
TEMPERATURE: 96 F | DIASTOLIC BLOOD PRESSURE: 73 MMHG | SYSTOLIC BLOOD PRESSURE: 121 MMHG | BODY MASS INDEX: 24 KG/M2 | HEART RATE: 96 BPM | WEIGHT: 164 LBS | OXYGEN SATURATION: 96 % | RESPIRATION RATE: 18 BRPM

## 2019-01-01 VITALS
TEMPERATURE: 98 F | DIASTOLIC BLOOD PRESSURE: 81 MMHG | SYSTOLIC BLOOD PRESSURE: 132 MMHG | HEART RATE: 100 BPM | BODY MASS INDEX: 20 KG/M2 | OXYGEN SATURATION: 98 % | RESPIRATION RATE: 18 BRPM | WEIGHT: 137 LBS

## 2019-01-01 VITALS
HEIGHT: 70 IN | HEART RATE: 77 BPM | WEIGHT: 151.25 LBS | OXYGEN SATURATION: 96 % | DIASTOLIC BLOOD PRESSURE: 82 MMHG | SYSTOLIC BLOOD PRESSURE: 123 MMHG | RESPIRATION RATE: 15 BRPM | TEMPERATURE: 97 F | BODY MASS INDEX: 21.65 KG/M2

## 2019-01-01 VITALS
HEART RATE: 99 BPM | TEMPERATURE: 99 F | RESPIRATION RATE: 18 BRPM | OXYGEN SATURATION: 98 % | SYSTOLIC BLOOD PRESSURE: 111 MMHG | DIASTOLIC BLOOD PRESSURE: 69 MMHG

## 2019-01-01 VITALS
HEART RATE: 77 BPM | OXYGEN SATURATION: 98 % | TEMPERATURE: 98 F | RESPIRATION RATE: 18 BRPM | SYSTOLIC BLOOD PRESSURE: 125 MMHG | DIASTOLIC BLOOD PRESSURE: 65 MMHG | HEIGHT: 70 IN | BODY MASS INDEX: 22.95 KG/M2 | WEIGHT: 160.31 LBS

## 2019-01-01 VITALS
RESPIRATION RATE: 16 BRPM | HEART RATE: 88 BPM | SYSTOLIC BLOOD PRESSURE: 116 MMHG | DIASTOLIC BLOOD PRESSURE: 69 MMHG | TEMPERATURE: 99 F | OXYGEN SATURATION: 100 %

## 2019-01-01 VITALS
SYSTOLIC BLOOD PRESSURE: 122 MMHG | WEIGHT: 141 LBS | HEIGHT: 70 IN | TEMPERATURE: 100 F | BODY MASS INDEX: 20.19 KG/M2 | RESPIRATION RATE: 16 BRPM | OXYGEN SATURATION: 97 % | HEART RATE: 104 BPM | DIASTOLIC BLOOD PRESSURE: 76 MMHG

## 2019-01-01 VITALS
BODY MASS INDEX: 20 KG/M2 | WEIGHT: 140 LBS | DIASTOLIC BLOOD PRESSURE: 74 MMHG | OXYGEN SATURATION: 100 % | TEMPERATURE: 97 F | HEART RATE: 105 BPM | SYSTOLIC BLOOD PRESSURE: 106 MMHG | RESPIRATION RATE: 18 BRPM

## 2019-01-01 VITALS
HEART RATE: 84 BPM | OXYGEN SATURATION: 100 % | SYSTOLIC BLOOD PRESSURE: 122 MMHG | WEIGHT: 162.5 LBS | RESPIRATION RATE: 18 BRPM | BODY MASS INDEX: 24 KG/M2 | DIASTOLIC BLOOD PRESSURE: 79 MMHG | TEMPERATURE: 98 F

## 2019-01-01 DIAGNOSIS — C20 RECTAL CANCER METASTASIZED TO LIVER (HCC): ICD-10-CM

## 2019-01-01 DIAGNOSIS — C20 RECTAL CANCER METASTASIZED TO LIVER (HCC): Primary | ICD-10-CM

## 2019-01-01 DIAGNOSIS — C78.7 RECTAL CANCER METASTASIZED TO LIVER (HCC): ICD-10-CM

## 2019-01-01 DIAGNOSIS — C77.2 SECONDARY MALIGNANT NEOPLASM OF RETROPERITONEAL LYMPH NODES (HCC): ICD-10-CM

## 2019-01-01 DIAGNOSIS — G43.019 INTRACTABLE MIGRAINE WITHOUT AURA AND WITHOUT STATUS MIGRAINOSUS: Primary | ICD-10-CM

## 2019-01-01 DIAGNOSIS — G43.019 INTRACTABLE MIGRAINE WITHOUT AURA AND WITHOUT STATUS MIGRAINOSUS: ICD-10-CM

## 2019-01-01 DIAGNOSIS — Z01.818 PREOPERATIVE CLEARANCE: Primary | ICD-10-CM

## 2019-01-01 DIAGNOSIS — K59.03 DRUG-INDUCED CONSTIPATION: Primary | ICD-10-CM

## 2019-01-01 DIAGNOSIS — K56.609 SMALL BOWEL OBSTRUCTION (HCC): Primary | ICD-10-CM

## 2019-01-01 DIAGNOSIS — C20 RECTAL CANCER (HCC): ICD-10-CM

## 2019-01-01 DIAGNOSIS — C78.7 SECONDARY LIVER CANCER (HCC): ICD-10-CM

## 2019-01-01 DIAGNOSIS — T45.1X5A CHEMOTHERAPY-INDUCED ENTERITIS: ICD-10-CM

## 2019-01-01 DIAGNOSIS — R10.12 LEFT UPPER QUADRANT PAIN: Primary | ICD-10-CM

## 2019-01-01 DIAGNOSIS — C78.7 SECONDARY LIVER CANCER (HCC): Primary | ICD-10-CM

## 2019-01-01 DIAGNOSIS — K52.1 CHEMOTHERAPY INDUCED DIARRHEA: ICD-10-CM

## 2019-01-01 DIAGNOSIS — R79.89 ELEVATED LFTS: Primary | ICD-10-CM

## 2019-01-01 DIAGNOSIS — C19 COLORECTAL CANCER (HCC): Primary | ICD-10-CM

## 2019-01-01 DIAGNOSIS — R31.0 GROSS HEMATURIA: ICD-10-CM

## 2019-01-01 DIAGNOSIS — C78.7 RECTAL CANCER METASTASIZED TO LIVER (HCC): Primary | ICD-10-CM

## 2019-01-01 DIAGNOSIS — K59.03 DRUG-INDUCED CONSTIPATION: ICD-10-CM

## 2019-01-01 DIAGNOSIS — R33.9 URINARY RETENTION: ICD-10-CM

## 2019-01-01 DIAGNOSIS — A04.72 C. DIFFICILE ENTERITIS: ICD-10-CM

## 2019-01-01 DIAGNOSIS — E83.42 HYPOMAGNESEMIA: ICD-10-CM

## 2019-01-01 DIAGNOSIS — N13.30 HYDRONEPHROSIS, LEFT: ICD-10-CM

## 2019-01-01 DIAGNOSIS — K56.609 SBO (SMALL BOWEL OBSTRUCTION) (HCC): Primary | ICD-10-CM

## 2019-01-01 DIAGNOSIS — T45.1X5A SKIN CHANGES RELATED TO CHEMOTHERAPY: ICD-10-CM

## 2019-01-01 DIAGNOSIS — E87.6 HYPOKALEMIA: ICD-10-CM

## 2019-01-01 DIAGNOSIS — Z51.11 ENCOUNTER FOR ANTINEOPLASTIC CHEMOTHERAPY: ICD-10-CM

## 2019-01-01 DIAGNOSIS — A49.01 STAPH AUREUS INFECTION: ICD-10-CM

## 2019-01-01 DIAGNOSIS — N13.30 HYDRONEPHROSIS, UNSPECIFIED HYDRONEPHROSIS TYPE: ICD-10-CM

## 2019-01-01 DIAGNOSIS — R52 INTRACTABLE PAIN: ICD-10-CM

## 2019-01-01 DIAGNOSIS — R10.12 LEFT UPPER QUADRANT PAIN: ICD-10-CM

## 2019-01-01 DIAGNOSIS — R11.2 INTRACTABLE VOMITING WITH NAUSEA, UNSPECIFIED VOMITING TYPE: ICD-10-CM

## 2019-01-01 DIAGNOSIS — C77.2 SECONDARY MALIGNANT NEOPLASM OF RETROPERITONEAL LYMPH NODES (HCC): Primary | ICD-10-CM

## 2019-01-01 DIAGNOSIS — N39.0 URINARY TRACT INFECTION WITHOUT HEMATURIA, SITE UNSPECIFIED: ICD-10-CM

## 2019-01-01 DIAGNOSIS — R79.89 ELEVATED LFTS: ICD-10-CM

## 2019-01-01 DIAGNOSIS — T45.1X5A ANEMIA DUE TO ANTINEOPLASTIC CHEMOTHERAPY: ICD-10-CM

## 2019-01-01 DIAGNOSIS — C20 RECTAL CANCER METASTATIC TO BONE (HCC): Primary | ICD-10-CM

## 2019-01-01 DIAGNOSIS — C19 COLORECTAL CANCER (HCC): ICD-10-CM

## 2019-01-01 DIAGNOSIS — E86.0 DEHYDRATION: ICD-10-CM

## 2019-01-01 DIAGNOSIS — R53.1 GENERALIZED WEAKNESS: ICD-10-CM

## 2019-01-01 DIAGNOSIS — N30.01 ACUTE CYSTITIS WITH HEMATURIA: ICD-10-CM

## 2019-01-01 DIAGNOSIS — R53.1 WEAKNESS: ICD-10-CM

## 2019-01-01 DIAGNOSIS — R10.9 FLANK PAIN: ICD-10-CM

## 2019-01-01 DIAGNOSIS — D64.81 ANEMIA DUE TO ANTINEOPLASTIC CHEMOTHERAPY: ICD-10-CM

## 2019-01-01 DIAGNOSIS — R30.0 DYSURIA: ICD-10-CM

## 2019-01-01 DIAGNOSIS — C79.51 RECTAL CANCER METASTATIC TO BONE (HCC): ICD-10-CM

## 2019-01-01 DIAGNOSIS — K56.609 SBO (SMALL BOWEL OBSTRUCTION) (HCC): ICD-10-CM

## 2019-01-01 DIAGNOSIS — E87.6 HYPOKALEMIA: Primary | ICD-10-CM

## 2019-01-01 DIAGNOSIS — C79.51 RECTAL CANCER METASTATIC TO BONE (HCC): Primary | ICD-10-CM

## 2019-01-01 DIAGNOSIS — K52.1 CHEMOTHERAPY INDUCED DIARRHEA: Primary | ICD-10-CM

## 2019-01-01 DIAGNOSIS — Z15.09 MONOALLELIC MUTATION OF BRAF GENE: ICD-10-CM

## 2019-01-01 DIAGNOSIS — R63.4 WEIGHT LOSS: Primary | ICD-10-CM

## 2019-01-01 DIAGNOSIS — D50.9 IRON DEFICIENCY ANEMIA, UNSPECIFIED IRON DEFICIENCY ANEMIA TYPE: ICD-10-CM

## 2019-01-01 DIAGNOSIS — Z02.9 ENCOUNTERS FOR UNSPECIFIED ADMINISTRATIVE PURPOSE: ICD-10-CM

## 2019-01-01 DIAGNOSIS — R10.9 INTRACTABLE ABDOMINAL PAIN: Primary | ICD-10-CM

## 2019-01-01 DIAGNOSIS — K52.1 CHEMOTHERAPY-INDUCED ENTERITIS: ICD-10-CM

## 2019-01-01 DIAGNOSIS — E86.0 DEHYDRATION: Primary | ICD-10-CM

## 2019-01-01 DIAGNOSIS — R23.9 SKIN CHANGES RELATED TO CHEMOTHERAPY: ICD-10-CM

## 2019-01-01 DIAGNOSIS — D50.0 IRON DEFICIENCY ANEMIA DUE TO CHRONIC BLOOD LOSS: ICD-10-CM

## 2019-01-01 DIAGNOSIS — T83.84XA PAIN DUE TO URETERAL STENT, INITIAL ENCOUNTER (HCC): ICD-10-CM

## 2019-01-01 DIAGNOSIS — C20 RECTAL CANCER METASTATIC TO BONE (HCC): ICD-10-CM

## 2019-01-01 DIAGNOSIS — R19.7 DIARRHEA, UNSPECIFIED TYPE: ICD-10-CM

## 2019-01-01 DIAGNOSIS — Z15.89 MONOALLELIC MUTATION OF BRAF GENE: ICD-10-CM

## 2019-01-01 DIAGNOSIS — K62.89 RECTAL PAIN: ICD-10-CM

## 2019-01-01 DIAGNOSIS — Z71.9 ENCOUNTER FOR HEALTH EDUCATION: ICD-10-CM

## 2019-01-01 DIAGNOSIS — C20 RECTAL ADENOCARCINOMA METASTATIC TO INTRA-ABDOMINAL LYMPH NODE (HCC): ICD-10-CM

## 2019-01-01 DIAGNOSIS — R11.2 NAUSEA & VOMITING: ICD-10-CM

## 2019-01-01 DIAGNOSIS — D50.0 BLOOD LOSS ANEMIA: ICD-10-CM

## 2019-01-01 DIAGNOSIS — A49.01 STAPH AUREUS INFECTION: Primary | ICD-10-CM

## 2019-01-01 DIAGNOSIS — T45.1X5A CHEMOTHERAPY INDUCED DIARRHEA: Primary | ICD-10-CM

## 2019-01-01 DIAGNOSIS — R30.0 DYSURIA: Primary | ICD-10-CM

## 2019-01-01 DIAGNOSIS — C77.2 RECTAL ADENOCARCINOMA METASTATIC TO INTRA-ABDOMINAL LYMPH NODE (HCC): ICD-10-CM

## 2019-01-01 DIAGNOSIS — T45.1X5A CHEMOTHERAPY INDUCED DIARRHEA: ICD-10-CM

## 2019-01-01 DIAGNOSIS — R53.1 WEAKNESS: Primary | ICD-10-CM

## 2019-01-01 DIAGNOSIS — M25.552 PAIN IN LEFT HIP: ICD-10-CM

## 2019-01-01 DIAGNOSIS — E87.1 HYPONATREMIA: ICD-10-CM

## 2019-01-01 DIAGNOSIS — M25.552 ACUTE HIP PAIN, LEFT: ICD-10-CM

## 2019-01-01 DIAGNOSIS — N17.9 AKI (ACUTE KIDNEY INJURY) (HCC): ICD-10-CM

## 2019-01-01 DIAGNOSIS — R19.7 DIARRHEA, UNSPECIFIED TYPE: Primary | ICD-10-CM

## 2019-01-01 DIAGNOSIS — F32.9 REACTIVE DEPRESSION: ICD-10-CM

## 2019-01-01 DIAGNOSIS — N13.30 HYDRONEPHROSIS OF LEFT KIDNEY: ICD-10-CM

## 2019-01-01 LAB
ALBUMIN SERPL-MCNC: 2.1 G/DL (ref 3.4–5)
ALBUMIN SERPL-MCNC: 2.2 G/DL (ref 3.4–5)
ALBUMIN SERPL-MCNC: 2.3 G/DL (ref 3.4–5)
ALBUMIN SERPL-MCNC: 2.4 G/DL (ref 3.1–4.5)
ALBUMIN SERPL-MCNC: 2.4 G/DL (ref 3.4–5)
ALBUMIN SERPL-MCNC: 2.6 G/DL (ref 3.1–4.5)
ALBUMIN SERPL-MCNC: 2.8 G/DL (ref 3.4–5)
ALBUMIN SERPL-MCNC: 3 G/DL (ref 3.1–4.5)
ALBUMIN SERPL-MCNC: 3.2 G/DL (ref 3.4–5)
ALBUMIN SERPL-MCNC: 3.2 G/DL (ref 3.4–5)
ALBUMIN/GLOB SERPL: 0.4 {RATIO} (ref 1–2)
ALBUMIN/GLOB SERPL: 0.5 {RATIO} (ref 1–2)
ALBUMIN/GLOB SERPL: 0.6 {RATIO} (ref 1–2)
ALBUMIN/GLOB SERPL: 0.7 {RATIO} (ref 1–2)
ALBUMIN/GLOB SERPL: 0.7 {RATIO} (ref 1–2)
ALBUMIN/GLOB SERPL: 0.8 {RATIO} (ref 1–2)
ALP LIVER SERPL-CCNC: 104 U/L (ref 45–117)
ALP LIVER SERPL-CCNC: 119 U/L (ref 45–117)
ALP LIVER SERPL-CCNC: 148 U/L (ref 45–117)
ALP LIVER SERPL-CCNC: 172 U/L (ref 45–117)
ALP LIVER SERPL-CCNC: 173 U/L (ref 45–117)
ALP LIVER SERPL-CCNC: 203 U/L (ref 45–117)
ALP LIVER SERPL-CCNC: 208 U/L (ref 45–117)
ALP LIVER SERPL-CCNC: 219 U/L (ref 45–117)
ALP LIVER SERPL-CCNC: 235 U/L (ref 45–117)
ALP LIVER SERPL-CCNC: 272 U/L (ref 45–117)
ALP LIVER SERPL-CCNC: 293 U/L (ref 45–117)
ALP LIVER SERPL-CCNC: 425 U/L (ref 45–117)
ALT SERPL-CCNC: 19 U/L (ref 16–61)
ALT SERPL-CCNC: 26 U/L (ref 16–61)
ALT SERPL-CCNC: 29 U/L (ref 16–61)
ALT SERPL-CCNC: 45 U/L (ref 17–63)
ALT SERPL-CCNC: 50 U/L (ref 17–63)
ALT SERPL-CCNC: 51 U/L (ref 16–61)
ALT SERPL-CCNC: 51 U/L (ref 17–63)
ALT SERPL-CCNC: 55 U/L (ref 16–61)
ALT SERPL-CCNC: 59 U/L (ref 16–61)
ALT SERPL-CCNC: 69 U/L (ref 16–61)
ANION GAP SERPL CALC-SCNC: 11 MMOL/L (ref 0–18)
ANION GAP SERPL CALC-SCNC: 4 MMOL/L (ref 0–18)
ANION GAP SERPL CALC-SCNC: 5 MMOL/L (ref 0–18)
ANION GAP SERPL CALC-SCNC: 6 MMOL/L (ref 0–18)
ANION GAP SERPL CALC-SCNC: 7 MMOL/L (ref 0–18)
ANION GAP SERPL CALC-SCNC: 8 MMOL/L (ref 0–18)
ANION GAP SERPL CALC-SCNC: 9 MMOL/L (ref 0–18)
AST SERPL-CCNC: 19 U/L (ref 15–37)
AST SERPL-CCNC: 21 U/L (ref 15–37)
AST SERPL-CCNC: 30 U/L (ref 15–37)
AST SERPL-CCNC: 34 U/L (ref 15–37)
AST SERPL-CCNC: 35 U/L (ref 15–37)
AST SERPL-CCNC: 37 U/L (ref 15–37)
AST SERPL-CCNC: 38 U/L (ref 15–41)
AST SERPL-CCNC: 38 U/L (ref 15–41)
AST SERPL-CCNC: 40 U/L (ref 15–37)
AST SERPL-CCNC: 41 U/L (ref 15–41)
AST SERPL-CCNC: 42 U/L (ref 15–37)
AST SERPL-CCNC: 56 U/L (ref 15–37)
BASOPHILS # BLD AUTO: 0 X10(3) UL (ref 0–0.2)
BASOPHILS # BLD AUTO: 0.02 X10(3) UL (ref 0–0.2)
BASOPHILS # BLD AUTO: 0.03 X10(3) UL (ref 0–0.2)
BASOPHILS # BLD AUTO: 0.04 X10(3) UL (ref 0–0.2)
BASOPHILS # BLD AUTO: 0.05 X10(3) UL (ref 0–0.2)
BASOPHILS # BLD AUTO: 0.06 X10(3) UL (ref 0–0.1)
BASOPHILS NFR BLD AUTO: 0 %
BASOPHILS NFR BLD AUTO: 0.3 %
BASOPHILS NFR BLD AUTO: 0.4 %
BASOPHILS NFR BLD AUTO: 0.5 %
BASOPHILS NFR BLD AUTO: 0.5 %
BASOPHILS NFR BLD AUTO: 0.6 %
BASOPHILS NFR BLD AUTO: 0.8 %
BASOPHILS NFR BLD AUTO: 0.8 %
BASOPHILS NFR BLD AUTO: 1 %
BASOPHILS NFR BLD AUTO: 1 %
BILIRUB SERPL-MCNC: 0.1 MG/DL (ref 0.1–2)
BILIRUB SERPL-MCNC: 0.2 MG/DL (ref 0.1–2)
BILIRUB SERPL-MCNC: 0.2 MG/DL (ref 0.1–2)
BILIRUB SERPL-MCNC: 0.3 MG/DL (ref 0.1–2)
BILIRUB SERPL-MCNC: 0.3 MG/DL (ref 0.1–2)
BILIRUB SERPL-MCNC: 0.5 MG/DL (ref 0.1–2)
BILIRUB SERPL-MCNC: 0.6 MG/DL (ref 0.1–2)
BILIRUB SERPL-MCNC: 0.6 MG/DL (ref 0.1–2)
BILIRUB SERPL-MCNC: 0.7 MG/DL (ref 0.1–2)
BILIRUB SERPL-MCNC: <0.1 MG/DL (ref 0.1–2)
BILIRUB UR QL STRIP.AUTO: NEGATIVE
BUN BLD-MCNC: 10 MG/DL (ref 7–18)
BUN BLD-MCNC: 10 MG/DL (ref 7–18)
BUN BLD-MCNC: 11 MG/DL (ref 7–18)
BUN BLD-MCNC: 11 MG/DL (ref 7–18)
BUN BLD-MCNC: 12 MG/DL (ref 7–18)
BUN BLD-MCNC: 12 MG/DL (ref 7–18)
BUN BLD-MCNC: 13 MG/DL (ref 7–18)
BUN BLD-MCNC: 13 MG/DL (ref 7–18)
BUN BLD-MCNC: 14 MG/DL (ref 7–18)
BUN BLD-MCNC: 15 MG/DL (ref 8–20)
BUN BLD-MCNC: 16 MG/DL (ref 7–18)
BUN BLD-MCNC: 17 MG/DL (ref 7–18)
BUN BLD-MCNC: 9 MG/DL (ref 7–18)
BUN BLD-MCNC: 9 MG/DL (ref 8–20)
BUN BLD-MCNC: 9 MG/DL (ref 8–20)
BUN/CREAT SERPL: 10.2 (ref 10–20)
BUN/CREAT SERPL: 10.2 (ref 10–20)
BUN/CREAT SERPL: 10.5 (ref 10–20)
BUN/CREAT SERPL: 10.6 (ref 10–20)
BUN/CREAT SERPL: 10.7 (ref 10–20)
BUN/CREAT SERPL: 10.9 (ref 10–20)
BUN/CREAT SERPL: 11 (ref 10–20)
BUN/CREAT SERPL: 11.3 (ref 10–20)
BUN/CREAT SERPL: 11.8 (ref 10–20)
BUN/CREAT SERPL: 12.1 (ref 10–20)
BUN/CREAT SERPL: 12.9 (ref 10–20)
BUN/CREAT SERPL: 13.5 (ref 10–20)
BUN/CREAT SERPL: 14.8 (ref 10–20)
BUN/CREAT SERPL: 8.5 (ref 10–20)
BUN/CREAT SERPL: 8.8 (ref 10–20)
BUN/CREAT SERPL: 9 (ref 10–20)
BUN/CREAT SERPL: 9.7 (ref 10–20)
CALCIUM BLD-MCNC: 7.8 MG/DL (ref 8.5–10.1)
CALCIUM BLD-MCNC: 7.9 MG/DL (ref 8.5–10.1)
CALCIUM BLD-MCNC: 8 MG/DL (ref 8.3–10.3)
CALCIUM BLD-MCNC: 8.5 MG/DL (ref 8.5–10.1)
CALCIUM BLD-MCNC: 8.6 MG/DL (ref 8.3–10.3)
CALCIUM BLD-MCNC: 8.6 MG/DL (ref 8.5–10.1)
CALCIUM BLD-MCNC: 8.6 MG/DL (ref 8.5–10.1)
CALCIUM BLD-MCNC: 8.8 MG/DL (ref 8.3–10.3)
CALCIUM BLD-MCNC: 8.8 MG/DL (ref 8.5–10.1)
CALCIUM BLD-MCNC: 8.9 MG/DL (ref 8.5–10.1)
CALCIUM BLD-MCNC: 8.9 MG/DL (ref 8.5–10.1)
CALCIUM BLD-MCNC: 9 MG/DL (ref 8.5–10.1)
CALCIUM BLD-MCNC: 9.2 MG/DL (ref 8.5–10.1)
CALCIUM BLD-MCNC: 9.2 MG/DL (ref 8.5–10.1)
CALCIUM BLD-MCNC: 9.3 MG/DL (ref 8.5–10.1)
CALCIUM BLD-MCNC: 9.4 MG/DL (ref 8.5–10.1)
CALCIUM BLD-MCNC: 9.4 MG/DL (ref 8.5–10.1)
CEA SERPL-MCNC: 12.5 NG/ML (ref ?–5)
CEA SERPL-MCNC: 12.7 NG/ML (ref ?–5)
CEA SERPL-MCNC: 12.7 NG/ML (ref ?–5)
CEA SERPL-MCNC: 19 NG/ML (ref 0.5–5)
CEA SERPL-MCNC: 19.1 NG/ML (ref ?–5)
CEA SERPL-MCNC: 25.2 NG/ML (ref ?–5)
CEA SERPL-MCNC: 27.1 NG/ML (ref ?–5)
CEA SERPL-MCNC: 45.8 NG/ML (ref 0.5–5)
CHLORIDE SERPL-SCNC: 100 MMOL/L (ref 98–107)
CHLORIDE SERPL-SCNC: 100 MMOL/L (ref 98–112)
CHLORIDE SERPL-SCNC: 100 MMOL/L (ref 98–112)
CHLORIDE SERPL-SCNC: 101 MMOL/L (ref 98–107)
CHLORIDE SERPL-SCNC: 101 MMOL/L (ref 98–112)
CHLORIDE SERPL-SCNC: 102 MMOL/L (ref 98–107)
CHLORIDE SERPL-SCNC: 102 MMOL/L (ref 98–107)
CHLORIDE SERPL-SCNC: 102 MMOL/L (ref 98–112)
CHLORIDE SERPL-SCNC: 104 MMOL/L (ref 101–111)
CHLORIDE SERPL-SCNC: 104 MMOL/L (ref 101–111)
CHLORIDE SERPL-SCNC: 105 MMOL/L (ref 98–107)
CHLORIDE SERPL-SCNC: 105 MMOL/L (ref 98–112)
CHLORIDE SERPL-SCNC: 106 MMOL/L (ref 98–112)
CHLORIDE SERPL-SCNC: 108 MMOL/L (ref 101–111)
CHLORIDE SERPL-SCNC: 96 MMOL/L (ref 98–112)
CHLORIDE SERPL-SCNC: 97 MMOL/L (ref 98–112)
CHLORIDE SERPL-SCNC: 99 MMOL/L (ref 98–107)
CLARITY UR REFRACT.AUTO: CLEAR
CO2 SERPL-SCNC: 22 MMOL/L (ref 21–32)
CO2 SERPL-SCNC: 24 MMOL/L (ref 21–32)
CO2 SERPL-SCNC: 25 MMOL/L (ref 21–32)
CO2 SERPL-SCNC: 25 MMOL/L (ref 22–32)
CO2 SERPL-SCNC: 26 MMOL/L (ref 21–32)
CO2 SERPL-SCNC: 26 MMOL/L (ref 22–32)
CO2 SERPL-SCNC: 26 MMOL/L (ref 22–32)
CO2 SERPL-SCNC: 27 MMOL/L (ref 21–32)
CO2 SERPL-SCNC: 27 MMOL/L (ref 21–32)
CO2 SERPL-SCNC: 28 MMOL/L (ref 21–32)
CO2 SERPL-SCNC: 29 MMOL/L (ref 21–32)
CO2 SERPL-SCNC: 30 MMOL/L (ref 21–32)
COLOR UR AUTO: YELLOW
CREAT BLD-MCNC: 0.74 MG/DL (ref 0.7–1.3)
CREAT BLD-MCNC: 0.84 MG/DL (ref 0.7–1.3)
CREAT BLD-MCNC: 0.86 MG/DL (ref 0.7–1.3)
CREAT BLD-MCNC: 0.88 MG/DL (ref 0.7–1.3)
CREAT BLD-MCNC: 0.93 MG/DL (ref 0.7–1.3)
CREAT BLD-MCNC: 1 MG/DL (ref 0.7–1.3)
CREAT BLD-MCNC: 1 MG/DL (ref 0.7–1.3)
CREAT BLD-MCNC: 1.06 MG/DL (ref 0.7–1.3)
CREAT BLD-MCNC: 1.08 MG/DL (ref 0.7–1.3)
CREAT BLD-MCNC: 1.1 MG/DL (ref 0.7–1.3)
CREAT BLD-MCNC: 1.1 MG/DL (ref 0.7–1.3)
CREAT BLD-MCNC: 1.13 MG/DL (ref 0.7–1.3)
CREAT BLD-MCNC: 1.13 MG/DL (ref 0.7–1.3)
CREAT BLD-MCNC: 1.16 MG/DL (ref 0.7–1.3)
CREAT BLD-MCNC: 1.37 MG/DL (ref 0.7–1.3)
CREAT BLD-MCNC: 1.41 MG/DL (ref 0.7–1.3)
CREAT BLD-MCNC: 1.42 MG/DL (ref 0.7–1.3)
DEPRECATED HBV CORE AB SER IA-ACNC: 261.7 NG/ML (ref 30–530)
DEPRECATED RDW RBC AUTO: 47.4 FL (ref 35.1–46.3)
DEPRECATED RDW RBC AUTO: 47.7 FL (ref 35.1–46.3)
DEPRECATED RDW RBC AUTO: 48.1 FL (ref 35.1–46.3)
DEPRECATED RDW RBC AUTO: 49.1 FL (ref 35.1–46.3)
DEPRECATED RDW RBC AUTO: 49.7 FL (ref 35.1–46.3)
DEPRECATED RDW RBC AUTO: 50.2 FL (ref 35.1–46.3)
DEPRECATED RDW RBC AUTO: 50.7 FL (ref 35.1–46.3)
DEPRECATED RDW RBC AUTO: 53.8 FL (ref 35.1–46.3)
DEPRECATED RDW RBC AUTO: 61.1 FL (ref 35.1–46.3)
DEPRECATED RDW RBC AUTO: 61.4 FL (ref 35.1–46.3)
DEPRECATED RDW RBC AUTO: 62.3 FL (ref 35.1–46.3)
DEPRECATED RDW RBC AUTO: 62.5 FL (ref 35.1–46.3)
DEPRECATED RDW RBC AUTO: 62.6 FL (ref 35.1–46.3)
DEPRECATED RDW RBC AUTO: 62.7 FL (ref 35.1–46.3)
DEPRECATED RDW RBC AUTO: 62.7 FL (ref 35.1–46.3)
DEPRECATED RDW RBC AUTO: 63.7 FL (ref 35.1–46.3)
EOSINOPHIL # BLD AUTO: 0 X10(3) UL (ref 0–0.7)
EOSINOPHIL # BLD AUTO: 0.01 X10(3) UL (ref 0–0.7)
EOSINOPHIL # BLD AUTO: 0.03 X10(3) UL (ref 0–0.7)
EOSINOPHIL # BLD AUTO: 0.03 X10(3) UL (ref 0–0.7)
EOSINOPHIL # BLD AUTO: 0.09 X10(3) UL (ref 0–0.7)
EOSINOPHIL # BLD AUTO: 0.1 X10(3) UL (ref 0–0.7)
EOSINOPHIL # BLD AUTO: 0.11 X10(3) UL (ref 0–0.7)
EOSINOPHIL # BLD AUTO: 0.11 X10(3) UL (ref 0–0.7)
EOSINOPHIL # BLD AUTO: 0.13 X10(3) UL (ref 0–0.7)
EOSINOPHIL # BLD AUTO: 0.14 X10(3) UL (ref 0–0.7)
EOSINOPHIL # BLD AUTO: 0.15 X10(3) UL (ref 0–0.7)
EOSINOPHIL # BLD AUTO: 0.16 X10(3) UL (ref 0–0.7)
EOSINOPHIL # BLD AUTO: 0.16 X10(3) UL (ref 0–0.7)
EOSINOPHIL # BLD AUTO: 0.17 X10(3) UL (ref 0–0.7)
EOSINOPHIL # BLD AUTO: 0.24 X10(3) UL (ref 0–0.3)
EOSINOPHIL NFR BLD AUTO: 0 %
EOSINOPHIL NFR BLD AUTO: 0.1 %
EOSINOPHIL NFR BLD AUTO: 0.2 %
EOSINOPHIL NFR BLD AUTO: 0.8 %
EOSINOPHIL NFR BLD AUTO: 0.9 %
EOSINOPHIL NFR BLD AUTO: 1.3 %
EOSINOPHIL NFR BLD AUTO: 1.5 %
EOSINOPHIL NFR BLD AUTO: 2.2 %
EOSINOPHIL NFR BLD AUTO: 2.3 %
EOSINOPHIL NFR BLD AUTO: 2.5 %
EOSINOPHIL NFR BLD AUTO: 2.6 %
EOSINOPHIL NFR BLD AUTO: 3.8 %
EOSINOPHIL NFR BLD AUTO: 3.9 %
EOSINOPHIL NFR BLD AUTO: 4.2 %
EOSINOPHIL NFR BLD AUTO: 5.5 %
ERYTHROCYTE [DISTWIDTH] IN BLOOD BY AUTOMATED COUNT: 14 % (ref 11–15)
ERYTHROCYTE [DISTWIDTH] IN BLOOD BY AUTOMATED COUNT: 14.3 % (ref 11–15)
ERYTHROCYTE [DISTWIDTH] IN BLOOD BY AUTOMATED COUNT: 14.4 % (ref 11.5–16)
ERYTHROCYTE [DISTWIDTH] IN BLOOD BY AUTOMATED COUNT: 14.4 % (ref 11–15)
ERYTHROCYTE [DISTWIDTH] IN BLOOD BY AUTOMATED COUNT: 14.4 % (ref 11–15)
ERYTHROCYTE [DISTWIDTH] IN BLOOD BY AUTOMATED COUNT: 14.6 % (ref 11–15)
ERYTHROCYTE [DISTWIDTH] IN BLOOD BY AUTOMATED COUNT: 14.7 % (ref 11–15)
ERYTHROCYTE [DISTWIDTH] IN BLOOD BY AUTOMATED COUNT: 15.6 % (ref 11–15)
ERYTHROCYTE [DISTWIDTH] IN BLOOD BY AUTOMATED COUNT: 16.7 % (ref 11–15)
ERYTHROCYTE [DISTWIDTH] IN BLOOD BY AUTOMATED COUNT: 18.4 % (ref 11–15)
ERYTHROCYTE [DISTWIDTH] IN BLOOD BY AUTOMATED COUNT: 18.5 % (ref 11–15)
ERYTHROCYTE [DISTWIDTH] IN BLOOD BY AUTOMATED COUNT: 18.6 % (ref 11–15)
ERYTHROCYTE [DISTWIDTH] IN BLOOD BY AUTOMATED COUNT: 18.7 % (ref 11–15)
ERYTHROCYTE [DISTWIDTH] IN BLOOD BY AUTOMATED COUNT: 19 % (ref 11–15)
ERYTHROCYTE [DISTWIDTH] IN BLOOD BY AUTOMATED COUNT: 19.2 % (ref 11–15)
GLOBULIN PLAS-MCNC: 3.7 G/DL (ref 2.8–4.4)
GLOBULIN PLAS-MCNC: 4 G/DL (ref 2.8–4.4)
GLOBULIN PLAS-MCNC: 4 G/DL (ref 2.8–4.4)
GLOBULIN PLAS-MCNC: 4.3 G/DL (ref 2.8–4.4)
GLOBULIN PLAS-MCNC: 4.3 G/DL (ref 2.8–4.4)
GLOBULIN PLAS-MCNC: 4.4 G/DL (ref 2.8–4.4)
GLOBULIN PLAS-MCNC: 4.5 G/DL (ref 2.8–4.4)
GLOBULIN PLAS-MCNC: 4.5 G/DL (ref 2.8–4.4)
GLOBULIN PLAS-MCNC: 4.6 G/DL (ref 2.8–4.4)
GLOBULIN PLAS-MCNC: 4.6 G/DL (ref 2.8–4.4)
GLOBULIN PLAS-MCNC: 5.1 G/DL (ref 2.8–4.4)
GLOBULIN PLAS-MCNC: 5.2 G/DL (ref 2.8–4.4)
GLUCOSE BLD-MCNC: 102 MG/DL (ref 70–99)
GLUCOSE BLD-MCNC: 104 MG/DL (ref 70–99)
GLUCOSE BLD-MCNC: 114 MG/DL (ref 70–99)
GLUCOSE BLD-MCNC: 117 MG/DL (ref 70–99)
GLUCOSE BLD-MCNC: 119 MG/DL (ref 70–99)
GLUCOSE BLD-MCNC: 120 MG/DL (ref 70–99)
GLUCOSE BLD-MCNC: 120 MG/DL (ref 70–99)
GLUCOSE BLD-MCNC: 124 MG/DL (ref 70–99)
GLUCOSE BLD-MCNC: 126 MG/DL (ref 70–99)
GLUCOSE BLD-MCNC: 131 MG/DL (ref 70–99)
GLUCOSE BLD-MCNC: 133 MG/DL (ref 70–99)
GLUCOSE BLD-MCNC: 134 MG/DL (ref 70–99)
GLUCOSE BLD-MCNC: 162 MG/DL (ref 70–99)
GLUCOSE BLD-MCNC: 94 MG/DL (ref 70–99)
GLUCOSE BLD-MCNC: 94 MG/DL (ref 70–99)
GLUCOSE BLD-MCNC: 98 MG/DL (ref 70–99)
GLUCOSE BLD-MCNC: 98 MG/DL (ref 70–99)
GLUCOSE UR STRIP.AUTO-MCNC: NEGATIVE MG/DL
HAV IGM SER QL: 1.4 MG/DL (ref 1.6–2.6)
HAV IGM SER QL: 1.7 MG/DL (ref 1.6–2.6)
HAV IGM SER QL: 1.9 MG/DL (ref 1.6–2.6)
HAV IGM SER QL: 2 MG/DL (ref 1.6–2.6)
HAV IGM SER QL: 2 MG/DL (ref 1.8–2.5)
HAV IGM SER QL: 2.1 MG/DL (ref 1.6–2.6)
HAV IGM SER QL: 2.1 MG/DL (ref 1.8–2.5)
HAV IGM SER QL: 2.3 MG/DL (ref 1.6–2.6)
HAV IGM SER QL: 2.3 MG/DL (ref 1.6–2.6)
HAV IGM SER QL: 2.4 MG/DL (ref 1.6–2.6)
HCT VFR BLD AUTO: 22.7 % (ref 39–53)
HCT VFR BLD AUTO: 23.2 % (ref 39–53)
HCT VFR BLD AUTO: 23.6 % (ref 39–53)
HCT VFR BLD AUTO: 24.6 % (ref 39–53)
HCT VFR BLD AUTO: 24.7 % (ref 39–53)
HCT VFR BLD AUTO: 24.9 % (ref 39–53)
HCT VFR BLD AUTO: 25.1 % (ref 39–53)
HCT VFR BLD AUTO: 27 % (ref 39–53)
HCT VFR BLD AUTO: 30.8 % (ref 39–53)
HCT VFR BLD AUTO: 31.5 % (ref 39–53)
HCT VFR BLD AUTO: 34.6 % (ref 39–53)
HCT VFR BLD AUTO: 35.9 % (ref 39–53)
HCT VFR BLD AUTO: 36.6 % (ref 39–53)
HCT VFR BLD AUTO: 37.1 % (ref 39–53)
HCT VFR BLD AUTO: 39.9 % (ref 39–53)
HCT VFR BLD AUTO: 40.9 % (ref 39–53)
HCT VFR BLD AUTO: 41.1 % (ref 37–53)
HGB BLD-MCNC: 10.2 G/DL (ref 13–17.5)
HGB BLD-MCNC: 10.4 G/DL (ref 13–17.5)
HGB BLD-MCNC: 11.6 G/DL (ref 13–17.5)
HGB BLD-MCNC: 11.6 G/DL (ref 13–17.5)
HGB BLD-MCNC: 11.8 G/DL (ref 13–17.5)
HGB BLD-MCNC: 12 G/DL (ref 13–17.5)
HGB BLD-MCNC: 12.8 G/DL (ref 13–17.5)
HGB BLD-MCNC: 13.1 G/DL (ref 13–17)
HGB BLD-MCNC: 13.1 G/DL (ref 13–17.5)
HGB BLD-MCNC: 7.1 G/DL (ref 13–17.5)
HGB BLD-MCNC: 7.1 G/DL (ref 13–17.5)
HGB BLD-MCNC: 7.5 G/DL (ref 13–17.5)
HGB BLD-MCNC: 7.6 G/DL (ref 13–17.5)
HGB BLD-MCNC: 7.7 G/DL (ref 13–17.5)
HGB BLD-MCNC: 7.7 G/DL (ref 13–17.5)
HGB BLD-MCNC: 7.8 G/DL (ref 13–17.5)
HGB BLD-MCNC: 8.4 G/DL (ref 13–17.5)
IMM GRANULOCYTES # BLD AUTO: 0.01 X10(3) UL (ref 0–1)
IMM GRANULOCYTES # BLD AUTO: 0.02 X10(3) UL (ref 0–1)
IMM GRANULOCYTES # BLD AUTO: 0.03 X10(3) UL (ref 0–1)
IMM GRANULOCYTES # BLD AUTO: 0.03 X10(3) UL (ref 0–1)
IMM GRANULOCYTES # BLD AUTO: 0.04 X10(3) UL (ref 0–1)
IMM GRANULOCYTES # BLD AUTO: 0.05 X10(3) UL (ref 0–1)
IMM GRANULOCYTES # BLD AUTO: 0.08 X10(3) UL (ref 0–1)
IMM GRANULOCYTES # BLD AUTO: 0.1 X10(3) UL (ref 0–1)
IMM GRANULOCYTES # BLD AUTO: 0.32 X10(3) UL (ref 0–1)
IMM GRANULOCYTES NFR BLD: 0.2 %
IMM GRANULOCYTES NFR BLD: 0.3 %
IMM GRANULOCYTES NFR BLD: 0.4 %
IMM GRANULOCYTES NFR BLD: 0.4 %
IMM GRANULOCYTES NFR BLD: 0.5 %
IMM GRANULOCYTES NFR BLD: 0.6 %
IMM GRANULOCYTES NFR BLD: 0.7 %
IMM GRANULOCYTES NFR BLD: 0.9 %
IMM GRANULOCYTES NFR BLD: 1.2 %
IMM GRANULOCYTES NFR BLD: 2 %
IMMATURE GRANULOCYTE COUNT: 0.02 X10(3) UL (ref 0–1)
IMMATURE GRANULOCYTE RATIO %: 0.3 %
INR BLD: 1.05 (ref 0.9–1.1)
INR BLD: 1.27 (ref 0.9–1.1)
IRON SATURATION: 7 % (ref 20–50)
IRON SERPL-MCNC: 18 UG/DL (ref 65–175)
KETONES UR STRIP.AUTO-MCNC: 15 MG/DL
KETONES UR STRIP.AUTO-MCNC: 15 MG/DL
LEUKOCYTE ESTERASE UR QL STRIP.AUTO: NEGATIVE
LYMPHOCYTES # BLD AUTO: 0.33 X10(3) UL (ref 1–4)
LYMPHOCYTES # BLD AUTO: 0.35 X10(3) UL (ref 1–4)
LYMPHOCYTES # BLD AUTO: 0.36 X10(3) UL (ref 1–4)
LYMPHOCYTES # BLD AUTO: 0.38 X10(3) UL (ref 1–4)
LYMPHOCYTES # BLD AUTO: 0.4 X10(3) UL (ref 1–4)
LYMPHOCYTES # BLD AUTO: 0.42 X10(3) UL (ref 1–4)
LYMPHOCYTES # BLD AUTO: 0.43 X10(3) UL (ref 1–4)
LYMPHOCYTES # BLD AUTO: 0.47 X10(3) UL (ref 1–4)
LYMPHOCYTES # BLD AUTO: 0.48 X10(3) UL (ref 1–4)
LYMPHOCYTES # BLD AUTO: 0.49 X10(3) UL (ref 1–4)
LYMPHOCYTES # BLD AUTO: 0.5 X10(3) UL (ref 1–4)
LYMPHOCYTES # BLD AUTO: 0.62 X10(3) UL (ref 1–4)
LYMPHOCYTES # BLD AUTO: 0.63 X10(3) UL (ref 1–4)
LYMPHOCYTES # BLD AUTO: 0.64 X10(3) UL (ref 1–4)
LYMPHOCYTES # BLD AUTO: 0.85 X10(3) UL (ref 0.9–4)
LYMPHOCYTES NFR BLD AUTO: 11.2 %
LYMPHOCYTES NFR BLD AUTO: 11.3 %
LYMPHOCYTES NFR BLD AUTO: 12.7 %
LYMPHOCYTES NFR BLD AUTO: 13.6 %
LYMPHOCYTES NFR BLD AUTO: 19 %
LYMPHOCYTES NFR BLD AUTO: 3.2 %
LYMPHOCYTES NFR BLD AUTO: 4.1 %
LYMPHOCYTES NFR BLD AUTO: 5.2 %
LYMPHOCYTES NFR BLD AUTO: 5.8 %
LYMPHOCYTES NFR BLD AUTO: 5.9 %
LYMPHOCYTES NFR BLD AUTO: 6.5 %
LYMPHOCYTES NFR BLD AUTO: 7.2 %
LYMPHOCYTES NFR BLD AUTO: 7.4 %
LYMPHOCYTES NFR BLD AUTO: 8 %
LYMPHOCYTES NFR BLD AUTO: 9.1 %
M PROTEIN MFR SERPL ELPH: 6.1 G/DL (ref 6.4–8.2)
M PROTEIN MFR SERPL ELPH: 6.4 G/DL (ref 6.4–8.2)
M PROTEIN MFR SERPL ELPH: 6.6 G/DL (ref 6.4–8.2)
M PROTEIN MFR SERPL ELPH: 6.9 G/DL (ref 6.4–8.2)
M PROTEIN MFR SERPL ELPH: 6.9 G/DL (ref 6.4–8.2)
M PROTEIN MFR SERPL ELPH: 7 G/DL (ref 6.4–8.2)
M PROTEIN MFR SERPL ELPH: 7 G/DL (ref 6.4–8.2)
M PROTEIN MFR SERPL ELPH: 7.4 G/DL (ref 6.4–8.2)
M PROTEIN MFR SERPL ELPH: 7.5 G/DL (ref 6.4–8.2)
M PROTEIN MFR SERPL ELPH: 7.6 G/DL (ref 6.4–8.2)
MCH RBC QN AUTO: 27.8 PG (ref 26–34)
MCH RBC QN AUTO: 28 PG (ref 26–34)
MCH RBC QN AUTO: 28.1 PG (ref 26–34)
MCH RBC QN AUTO: 28.1 PG (ref 26–34)
MCH RBC QN AUTO: 28.4 PG (ref 26–34)
MCH RBC QN AUTO: 28.4 PG (ref 26–34)
MCH RBC QN AUTO: 28.7 PG (ref 26–34)
MCH RBC QN AUTO: 28.9 PG (ref 26–34)
MCH RBC QN AUTO: 28.9 PG (ref 26–34)
MCH RBC QN AUTO: 29.6 PG (ref 26–34)
MCH RBC QN AUTO: 30.3 PG (ref 26–34)
MCH RBC QN AUTO: 30.7 PG (ref 26–34)
MCH RBC QN AUTO: 30.8 PG (ref 27–33.2)
MCH RBC QN AUTO: 31.3 PG (ref 26–34)
MCH RBC QN AUTO: 31.3 PG (ref 26–34)
MCHC RBC AUTO-ENTMCNC: 30.6 G/DL (ref 31–37)
MCHC RBC AUTO-ENTMCNC: 30.8 G/DL (ref 31–37)
MCHC RBC AUTO-ENTMCNC: 30.9 G/DL (ref 31–37)
MCHC RBC AUTO-ENTMCNC: 31.1 G/DL (ref 31–37)
MCHC RBC AUTO-ENTMCNC: 31.1 G/DL (ref 31–37)
MCHC RBC AUTO-ENTMCNC: 31.3 G/DL (ref 31–37)
MCHC RBC AUTO-ENTMCNC: 31.3 G/DL (ref 31–37)
MCHC RBC AUTO-ENTMCNC: 31.8 G/DL (ref 31–37)
MCHC RBC AUTO-ENTMCNC: 31.9 G/DL (ref 31–37)
MCHC RBC AUTO-ENTMCNC: 32 G/DL (ref 31–37)
MCHC RBC AUTO-ENTMCNC: 32.1 G/DL (ref 31–37)
MCHC RBC AUTO-ENTMCNC: 32.2 G/DL (ref 31–37)
MCHC RBC AUTO-ENTMCNC: 32.3 G/DL (ref 31–37)
MCHC RBC AUTO-ENTMCNC: 32.3 G/DL (ref 31–37)
MCHC RBC AUTO-ENTMCNC: 32.4 G/DL (ref 31–37)
MCHC RBC AUTO-ENTMCNC: 33.5 G/DL (ref 31–37)
MCHC RBC AUTO-ENTMCNC: 33.8 G/DL (ref 31–37)
MCV RBC AUTO: 88.8 FL (ref 80–100)
MCV RBC AUTO: 89.2 FL (ref 80–100)
MCV RBC AUTO: 90.1 FL (ref 80–100)
MCV RBC AUTO: 90.3 FL (ref 80–100)
MCV RBC AUTO: 90.4 FL (ref 80–100)
MCV RBC AUTO: 90.8 FL (ref 80–100)
MCV RBC AUTO: 90.8 FL (ref 80–100)
MCV RBC AUTO: 90.9 FL (ref 80–100)
MCV RBC AUTO: 91 FL (ref 80–100)
MCV RBC AUTO: 91.1 FL (ref 80–100)
MCV RBC AUTO: 92 FL (ref 80–100)
MCV RBC AUTO: 92.2 FL (ref 80–100)
MCV RBC AUTO: 92.8 FL (ref 80–100)
MCV RBC AUTO: 93.3 FL (ref 80–100)
MCV RBC AUTO: 93.7 FL (ref 80–100)
MCV RBC AUTO: 95.8 FL (ref 80–100)
MCV RBC AUTO: 96.7 FL (ref 80–99)
MONOCYTES # BLD AUTO: 0.26 X10(3) UL (ref 0.1–1)
MONOCYTES # BLD AUTO: 0.29 X10(3) UL (ref 0.1–1)
MONOCYTES # BLD AUTO: 0.5 X10(3) UL (ref 0.1–1)
MONOCYTES # BLD AUTO: 0.52 X10(3) UL (ref 0.1–1)
MONOCYTES # BLD AUTO: 0.53 X10(3) UL (ref 0.1–1)
MONOCYTES # BLD AUTO: 0.56 X10(3) UL (ref 0.1–1)
MONOCYTES # BLD AUTO: 0.58 X10(3) UL (ref 0.1–1)
MONOCYTES # BLD AUTO: 0.64 X10(3) UL (ref 0.1–1)
MONOCYTES # BLD AUTO: 0.74 X10(3) UL (ref 0.1–1)
MONOCYTES # BLD AUTO: 0.79 X10(3) UL (ref 0.1–1)
MONOCYTES # BLD AUTO: 0.81 X10(3) UL (ref 0.1–1)
MONOCYTES # BLD AUTO: 0.88 X10(3) UL (ref 0.1–1)
MONOCYTES # BLD AUTO: 0.97 X10(3) UL (ref 0.1–1)
MONOCYTES # BLD AUTO: 1.7 X10(3) UL (ref 0.1–1)
MONOCYTES # BLD AUTO: 2.02 X10(3) UL (ref 0.1–1)
MONOCYTES NFR BLD AUTO: 10.9 %
MONOCYTES NFR BLD AUTO: 12.8 %
MONOCYTES NFR BLD AUTO: 13.6 %
MONOCYTES NFR BLD AUTO: 13.8 %
MONOCYTES NFR BLD AUTO: 13.9 %
MONOCYTES NFR BLD AUTO: 14.1 %
MONOCYTES NFR BLD AUTO: 16 %
MONOCYTES NFR BLD AUTO: 16.1 %
MONOCYTES NFR BLD AUTO: 17.9 %
MONOCYTES NFR BLD AUTO: 5.2 %
MONOCYTES NFR BLD AUTO: 6.4 %
MONOCYTES NFR BLD AUTO: 8.7 %
MONOCYTES NFR BLD AUTO: 8.8 %
MONOCYTES NFR BLD AUTO: 9 %
MONOCYTES NFR BLD AUTO: 9.9 %
NEUTROPHIL ABS PRELIM: 4.21 X10 (3) UL (ref 1.3–6.7)
NEUTROPHILS # BLD AUTO: 10.04 X10 (3) UL (ref 1.5–7.7)
NEUTROPHILS # BLD AUTO: 10.04 X10(3) UL (ref 1.5–7.7)
NEUTROPHILS # BLD AUTO: 12.85 X10 (3) UL (ref 1.5–7.7)
NEUTROPHILS # BLD AUTO: 12.85 X10(3) UL (ref 1.5–7.7)
NEUTROPHILS # BLD AUTO: 2.05 X10 (3) UL (ref 1.5–7.7)
NEUTROPHILS # BLD AUTO: 2.05 X10(3) UL (ref 1.5–7.7)
NEUTROPHILS # BLD AUTO: 2.2 X10 (3) UL (ref 1.5–7.7)
NEUTROPHILS # BLD AUTO: 2.2 X10(3) UL (ref 1.5–7.7)
NEUTROPHILS # BLD AUTO: 2.57 X10 (3) UL (ref 1.5–7.7)
NEUTROPHILS # BLD AUTO: 2.57 X10(3) UL (ref 1.5–7.7)
NEUTROPHILS # BLD AUTO: 2.65 X10 (3) UL (ref 1.5–7.7)
NEUTROPHILS # BLD AUTO: 2.65 X10(3) UL (ref 1.5–7.7)
NEUTROPHILS # BLD AUTO: 3.81 X10 (3) UL (ref 1.5–7.7)
NEUTROPHILS # BLD AUTO: 3.81 X10(3) UL (ref 1.5–7.7)
NEUTROPHILS # BLD AUTO: 4.08 X10 (3) UL (ref 1.5–7.7)
NEUTROPHILS # BLD AUTO: 4.08 X10(3) UL (ref 1.5–7.7)
NEUTROPHILS # BLD AUTO: 4.15 X10 (3) UL (ref 1.5–7.7)
NEUTROPHILS # BLD AUTO: 4.15 X10(3) UL (ref 1.5–7.7)
NEUTROPHILS # BLD AUTO: 4.21 X10(3) UL (ref 1.3–6.7)
NEUTROPHILS # BLD AUTO: 4.59 X10 (3) UL (ref 1.5–7.7)
NEUTROPHILS # BLD AUTO: 4.59 X10(3) UL (ref 1.5–7.7)
NEUTROPHILS # BLD AUTO: 5.22 X10 (3) UL (ref 1.5–7.7)
NEUTROPHILS # BLD AUTO: 5.22 X10(3) UL (ref 1.5–7.7)
NEUTROPHILS # BLD AUTO: 5.42 X10 (3) UL (ref 1.5–7.7)
NEUTROPHILS # BLD AUTO: 5.42 X10(3) UL (ref 1.5–7.7)
NEUTROPHILS # BLD AUTO: 7.2 X10 (3) UL (ref 1.5–7.7)
NEUTROPHILS # BLD AUTO: 7.2 X10(3) UL (ref 1.5–7.7)
NEUTROPHILS # BLD AUTO: 8.87 X10 (3) UL (ref 1.5–7.7)
NEUTROPHILS # BLD AUTO: 8.87 X10(3) UL (ref 1.5–7.7)
NEUTROPHILS NFR BLD AUTO: 65.8 %
NEUTROPHILS NFR BLD AUTO: 66.5 %
NEUTROPHILS NFR BLD AUTO: 67.2 %
NEUTROPHILS NFR BLD AUTO: 68.3 %
NEUTROPHILS NFR BLD AUTO: 71.5 %
NEUTROPHILS NFR BLD AUTO: 73 %
NEUTROPHILS NFR BLD AUTO: 75.8 %
NEUTROPHILS NFR BLD AUTO: 77.1 %
NEUTROPHILS NFR BLD AUTO: 78.6 %
NEUTROPHILS NFR BLD AUTO: 81.3 %
NEUTROPHILS NFR BLD AUTO: 81.5 %
NEUTROPHILS NFR BLD AUTO: 82.8 %
NEUTROPHILS NFR BLD AUTO: 83.6 %
NEUTROPHILS NFR BLD AUTO: 83.7 %
NEUTROPHILS NFR BLD AUTO: 87.4 %
NITRITE UR QL STRIP.AUTO: NEGATIVE
NITRITE UR QL STRIP.AUTO: NEGATIVE
NITRITE UR QL STRIP.AUTO: POSITIVE
OSMOLALITY SERPL CALC.SUM OF ELEC: 274 MOSM/KG (ref 275–295)
OSMOLALITY SERPL CALC.SUM OF ELEC: 276 MOSM/KG (ref 275–295)
OSMOLALITY SERPL CALC.SUM OF ELEC: 276 MOSM/KG (ref 275–295)
OSMOLALITY SERPL CALC.SUM OF ELEC: 277 MOSM/KG (ref 275–295)
OSMOLALITY SERPL CALC.SUM OF ELEC: 279 MOSM/KG (ref 275–295)
OSMOLALITY SERPL CALC.SUM OF ELEC: 280 MOSM/KG (ref 275–295)
OSMOLALITY SERPL CALC.SUM OF ELEC: 281 MOSM/KG (ref 275–295)
OSMOLALITY SERPL CALC.SUM OF ELEC: 282 MOSM/KG (ref 275–295)
OSMOLALITY SERPL CALC.SUM OF ELEC: 282 MOSM/KG (ref 275–295)
OSMOLALITY SERPL CALC.SUM OF ELEC: 283 MOSM/KG (ref 275–295)
OSMOLALITY SERPL CALC.SUM OF ELEC: 283 MOSM/KG (ref 275–295)
OSMOLALITY SERPL CALC.SUM OF ELEC: 285 MOSM/KG (ref 275–295)
OSMOLALITY SERPL CALC.SUM OF ELEC: 286 MOSM/KG (ref 275–295)
OSMOLALITY SERPL CALC.SUM OF ELEC: 286 MOSM/KG (ref 275–295)
OSMOLALITY SERPL CALC.SUM OF ELEC: 288 MOSM/KG (ref 275–295)
OSMOLALITY SERPL CALC.SUM OF ELEC: 288 MOSM/KG (ref 275–295)
OSMOLALITY SERPL CALC.SUM OF ELEC: 289 MOSM/KG (ref 275–295)
PH UR STRIP.AUTO: 6 [PH] (ref 4.5–8)
PH UR STRIP.AUTO: 6 [PH] (ref 4.5–8)
PH UR STRIP.AUTO: 7 [PH] (ref 4.5–8)
PLATELET # BLD AUTO: 188 10(3)UL (ref 150–450)
PLATELET # BLD AUTO: 207 10(3)UL (ref 150–450)
PLATELET # BLD AUTO: 207 10(3)UL (ref 150–450)
PLATELET # BLD AUTO: 215 10(3)UL (ref 150–450)
PLATELET # BLD AUTO: 226 10(3)UL (ref 150–450)
PLATELET # BLD AUTO: 228 10(3)UL (ref 150–450)
PLATELET # BLD AUTO: 250 10(3)UL (ref 150–450)
PLATELET # BLD AUTO: 299 10(3)UL (ref 150–450)
PLATELET # BLD AUTO: 300 10(3)UL (ref 150–450)
PLATELET # BLD AUTO: 307 10(3)UL (ref 150–450)
PLATELET # BLD AUTO: 327 10(3)UL (ref 150–450)
PLATELET # BLD AUTO: 342 10(3)UL (ref 150–450)
PLATELET # BLD AUTO: 352 10(3)UL (ref 150–450)
PLATELET # BLD AUTO: 354 10(3)UL (ref 150–450)
PLATELET # BLD AUTO: 358 10(3)UL (ref 150–450)
PLATELET # BLD AUTO: 366 10(3)UL (ref 150–450)
PLATELET # BLD AUTO: 373 10(3)UL (ref 150–450)
POTASSIUM SERPL-SCNC: 3.1 MMOL/L (ref 3.5–5.1)
POTASSIUM SERPL-SCNC: 3.3 MMOL/L (ref 3.5–5.1)
POTASSIUM SERPL-SCNC: 3.3 MMOL/L (ref 3.6–5.1)
POTASSIUM SERPL-SCNC: 3.4 MMOL/L (ref 3.5–5.1)
POTASSIUM SERPL-SCNC: 3.5 MMOL/L (ref 3.5–5.1)
POTASSIUM SERPL-SCNC: 3.5 MMOL/L (ref 3.5–5.1)
POTASSIUM SERPL-SCNC: 3.6 MMOL/L (ref 3.5–5.1)
POTASSIUM SERPL-SCNC: 3.7 MMOL/L (ref 3.5–5.1)
POTASSIUM SERPL-SCNC: 3.8 MMOL/L (ref 3.5–5.1)
POTASSIUM SERPL-SCNC: 3.8 MMOL/L (ref 3.6–5.1)
POTASSIUM SERPL-SCNC: 4.1 MMOL/L (ref 3.5–5.1)
POTASSIUM SERPL-SCNC: 4.7 MMOL/L (ref 3.5–5.1)
PROT UR STRIP.AUTO-MCNC: >=300 MG/DL
PROT UR STRIP.AUTO-MCNC: >=300 MG/DL
PROT UR STRIP.AUTO-MCNC: NEGATIVE MG/DL
PSA SERPL DL<=0.01 NG/ML-MCNC: 14.2 SECONDS (ref 12.5–14.7)
PSA SERPL DL<=0.01 NG/ML-MCNC: 16.5 SECONDS (ref 12.5–14.7)
RBC # BLD AUTO: 2.5 X10(6)UL (ref 4.3–5.7)
RBC # BLD AUTO: 2.55 X10(6)UL (ref 4.3–5.7)
RBC # BLD AUTO: 2.61 X10(6)UL (ref 4.3–5.7)
RBC # BLD AUTO: 2.71 X10(6)UL (ref 4.3–5.7)
RBC # BLD AUTO: 2.71 X10(6)UL (ref 4.3–5.7)
RBC # BLD AUTO: 2.74 X10(6)UL (ref 4.3–5.7)
RBC # BLD AUTO: 2.78 X10(6)UL (ref 4.3–5.7)
RBC # BLD AUTO: 2.93 X10(6)UL (ref 4.3–5.7)
RBC # BLD AUTO: 3.32 X10(6)UL (ref 4.3–5.7)
RBC # BLD AUTO: 3.53 X10(6)UL (ref 4.3–5.7)
RBC # BLD AUTO: 3.71 X10(6)UL (ref 4.3–5.7)
RBC # BLD AUTO: 3.83 X10(6)UL (ref 4.3–5.7)
RBC # BLD AUTO: 3.98 X10(6)UL (ref 4.3–5.7)
RBC # BLD AUTO: 4.18 X10(6)UL (ref 4.3–5.7)
RBC # BLD AUTO: 4.25 X10(6)UL (ref 4.3–5.7)
RBC # BLD AUTO: 4.27 X10(6)UL (ref 4.3–5.7)
RBC # BLD AUTO: 4.43 X10(6)UL (ref 4.3–5.7)
RBC #/AREA URNS AUTO: >10 /HPF
RBC UR QL AUTO: NEGATIVE
RED CELL DISTRIBUTION WIDTH-SD: 51.1 FL (ref 35.1–46.3)
SODIUM SERPL-SCNC: 132 MMOL/L (ref 136–145)
SODIUM SERPL-SCNC: 132 MMOL/L (ref 136–145)
SODIUM SERPL-SCNC: 133 MMOL/L (ref 136–145)
SODIUM SERPL-SCNC: 134 MMOL/L (ref 136–145)
SODIUM SERPL-SCNC: 135 MMOL/L (ref 136–145)
SODIUM SERPL-SCNC: 136 MMOL/L (ref 136–145)
SODIUM SERPL-SCNC: 136 MMOL/L (ref 136–145)
SODIUM SERPL-SCNC: 137 MMOL/L (ref 136–144)
SODIUM SERPL-SCNC: 137 MMOL/L (ref 136–144)
SODIUM SERPL-SCNC: 138 MMOL/L (ref 136–145)
SODIUM SERPL-SCNC: 139 MMOL/L (ref 136–145)
SODIUM SERPL-SCNC: 140 MMOL/L (ref 136–144)
SODIUM SERPL-SCNC: 140 MMOL/L (ref 136–145)
SP GR UR STRIP.AUTO: 1 (ref 1–1.03)
SP GR UR STRIP.AUTO: 1.02 (ref 1–1.03)
SP GR UR STRIP.AUTO: 1.02 (ref 1–1.03)
TOTAL IRON BINDING CAPACITY: 265 UG/DL (ref 240–450)
TRANSFERRIN SERPL-MCNC: 178 MG/DL (ref 200–360)
UROBILINOGEN UR STRIP.AUTO-MCNC: 1 MG/DL
UROBILINOGEN UR STRIP.AUTO-MCNC: 1 MG/DL
UROBILINOGEN UR STRIP.AUTO-MCNC: <2 MG/DL
WBC # BLD AUTO: 10.7 X10(3) UL (ref 4–11)
WBC # BLD AUTO: 11.5 X10(3) UL (ref 4–11)
WBC # BLD AUTO: 15.8 X10(3) UL (ref 4–11)
WBC # BLD AUTO: 3.1 X10(3) UL (ref 4–11)
WBC # BLD AUTO: 3.3 X10(3) UL (ref 4–11)
WBC # BLD AUTO: 3.6 X10(3) UL (ref 4–11)
WBC # BLD AUTO: 3.8 X10(3) UL (ref 4–11)
WBC # BLD AUTO: 4.9 X10(3) UL (ref 4–11)
WBC # BLD AUTO: 5 X10(3) UL (ref 4–11)
WBC # BLD AUTO: 5.7 X10(3) UL (ref 4–11)
WBC # BLD AUTO: 6 X10(3) UL (ref 4–11)
WBC # BLD AUTO: 6.3 X10(3) UL (ref 4–13)
WBC # BLD AUTO: 6.4 X10(3) UL (ref 4–11)
WBC # BLD AUTO: 6.5 X10(3) UL (ref 4–11)
WBC # BLD AUTO: 6.7 X10(3) UL (ref 4–11)
WBC # BLD AUTO: 8.1 X10(3) UL (ref 4–11)
WBC # BLD AUTO: 9.5 X10(3) UL (ref 4–11)
YEAST URINE: PRESENT

## 2019-01-01 PROCEDURE — 99232 SBSQ HOSP IP/OBS MODERATE 35: CPT | Performed by: INTERNAL MEDICINE

## 2019-01-01 PROCEDURE — 87186 SC STD MICRODIL/AGAR DIL: CPT

## 2019-01-01 PROCEDURE — 85025 COMPLETE CBC W/AUTO DIFF WBC: CPT

## 2019-01-01 PROCEDURE — 82378 CARCINOEMBRYONIC ANTIGEN: CPT

## 2019-01-01 PROCEDURE — 80053 COMPREHEN METABOLIC PANEL: CPT

## 2019-01-01 PROCEDURE — 99214 OFFICE O/P EST MOD 30 MIN: CPT | Performed by: INTERNAL MEDICINE

## 2019-01-01 PROCEDURE — 99217 OBSERVATION CARE DISCHARGE: CPT | Performed by: INTERNAL MEDICINE

## 2019-01-01 PROCEDURE — 0DN84ZZ RELEASE SMALL INTESTINE, PERCUTANEOUS ENDOSCOPIC APPROACH: ICD-10-PCS | Performed by: SURGERY

## 2019-01-01 PROCEDURE — 99223 1ST HOSP IP/OBS HIGH 75: CPT | Performed by: SURGERY

## 2019-01-01 PROCEDURE — 96374 THER/PROPH/DIAG INJ IV PUSH: CPT

## 2019-01-01 PROCEDURE — 83735 ASSAY OF MAGNESIUM: CPT

## 2019-01-01 PROCEDURE — 96375 TX/PRO/DX INJ NEW DRUG ADDON: CPT

## 2019-01-01 PROCEDURE — 96365 THER/PROPH/DIAG IV INF INIT: CPT

## 2019-01-01 PROCEDURE — 96417 CHEMO IV INFUS EACH ADDL SEQ: CPT

## 2019-01-01 PROCEDURE — 99223 1ST HOSP IP/OBS HIGH 75: CPT | Performed by: INTERNAL MEDICINE

## 2019-01-01 PROCEDURE — 81001 URINALYSIS AUTO W/SCOPE: CPT

## 2019-01-01 PROCEDURE — 99232 SBSQ HOSP IP/OBS MODERATE 35: CPT | Performed by: HOSPITALIST

## 2019-01-01 PROCEDURE — 99215 OFFICE O/P EST HI 40 MIN: CPT | Performed by: CLINICAL NURSE SPECIALIST

## 2019-01-01 PROCEDURE — 96361 HYDRATE IV INFUSION ADD-ON: CPT

## 2019-01-01 PROCEDURE — 36591 DRAW BLOOD OFF VENOUS DEVICE: CPT

## 2019-01-01 PROCEDURE — 80048 BASIC METABOLIC PNL TOTAL CA: CPT

## 2019-01-01 PROCEDURE — 74018 RADEX ABDOMEN 1 VIEW: CPT | Performed by: EMERGENCY MEDICINE

## 2019-01-01 PROCEDURE — 99239 HOSP IP/OBS DSCHRG MGMT >30: CPT | Performed by: INTERNAL MEDICINE

## 2019-01-01 PROCEDURE — 82565 ASSAY OF CREATININE: CPT

## 2019-01-01 PROCEDURE — 87086 URINE CULTURE/COLONY COUNT: CPT

## 2019-01-01 PROCEDURE — 1111F DSCHRG MED/CURRENT MED MERGE: CPT

## 2019-01-01 PROCEDURE — 96413 CHEMO IV INFUSION 1 HR: CPT

## 2019-01-01 PROCEDURE — BT1F1ZZ FLUOROSCOPY OF LEFT KIDNEY, URETER AND BLADDER USING LOW OSMOLAR CONTRAST: ICD-10-PCS | Performed by: UROLOGY

## 2019-01-01 PROCEDURE — 87077 CULTURE AEROBIC IDENTIFY: CPT

## 2019-01-01 PROCEDURE — 96360 HYDRATION IV INFUSION INIT: CPT

## 2019-01-01 PROCEDURE — 0TP98DZ REMOVAL OF INTRALUMINAL DEVICE FROM URETER, VIA NATURAL OR ARTIFICIAL OPENING ENDOSCOPIC: ICD-10-PCS | Performed by: UROLOGY

## 2019-01-01 PROCEDURE — 96366 THER/PROPH/DIAG IV INF ADDON: CPT

## 2019-01-01 PROCEDURE — 81015 MICROSCOPIC EXAM OF URINE: CPT

## 2019-01-01 PROCEDURE — 99243 OFF/OP CNSLTJ NEW/EST LOW 30: CPT | Performed by: FAMILY MEDICINE

## 2019-01-01 PROCEDURE — 99214 OFFICE O/P EST MOD 30 MIN: CPT | Performed by: CLINICAL NURSE SPECIALIST

## 2019-01-01 PROCEDURE — 0T9130Z DRAINAGE OF LEFT KIDNEY WITH DRAINAGE DEVICE, PERCUTANEOUS APPROACH: ICD-10-PCS | Performed by: RADIOLOGY

## 2019-01-01 PROCEDURE — 0T778DZ DILATION OF LEFT URETER WITH INTRALUMINAL DEVICE, VIA NATURAL OR ARTIFICIAL OPENING ENDOSCOPIC: ICD-10-PCS | Performed by: UROLOGY

## 2019-01-01 PROCEDURE — 96376 TX/PRO/DX INJ SAME DRUG ADON: CPT

## 2019-01-01 PROCEDURE — 0T25X0Z CHANGE DRAINAGE DEVICE IN KIDNEY, EXTERNAL APPROACH: ICD-10-PCS | Performed by: RADIOLOGY

## 2019-01-01 PROCEDURE — 76770 US EXAM ABDO BACK WALL COMP: CPT | Performed by: OBSTETRICS & GYNECOLOGY

## 2019-01-01 PROCEDURE — 99232 SBSQ HOSP IP/OBS MODERATE 35: CPT | Performed by: SURGERY

## 2019-01-01 PROCEDURE — 73030 X-RAY EXAM OF SHOULDER: CPT | Performed by: INTERNAL MEDICINE

## 2019-01-01 PROCEDURE — 87493 C DIFF AMPLIFIED PROBE: CPT

## 2019-01-01 PROCEDURE — 74176 CT ABD & PELVIS W/O CONTRAST: CPT | Performed by: INTERNAL MEDICINE

## 2019-01-01 PROCEDURE — 71045 X-RAY EXAM CHEST 1 VIEW: CPT | Performed by: EMERGENCY MEDICINE

## 2019-01-01 PROCEDURE — 87086 URINE CULTURE/COLONY COUNT: CPT | Performed by: PHYSICIAN ASSISTANT

## 2019-01-01 PROCEDURE — 74018 RADEX ABDOMEN 1 VIEW: CPT | Performed by: OBSTETRICS & GYNECOLOGY

## 2019-01-01 PROCEDURE — 36415 COLL VENOUS BLD VENIPUNCTURE: CPT

## 2019-01-01 PROCEDURE — 99496 TRANSJ CARE MGMT HIGH F2F 7D: CPT | Performed by: FAMILY MEDICINE

## 2019-01-01 PROCEDURE — 96411 CHEMO IV PUSH ADDL DRUG: CPT

## 2019-01-01 PROCEDURE — BT1FZZZ FLUOROSCOPY OF LEFT KIDNEY, URETER AND BLADDER: ICD-10-PCS | Performed by: UROLOGY

## 2019-01-01 PROCEDURE — 85018 HEMOGLOBIN: CPT

## 2019-01-01 PROCEDURE — 71045 X-RAY EXAM CHEST 1 VIEW: CPT | Performed by: INTERNAL MEDICINE

## 2019-01-01 PROCEDURE — 99233 SBSQ HOSP IP/OBS HIGH 50: CPT | Performed by: SURGERY

## 2019-01-01 PROCEDURE — 76700 US EXAM ABDOM COMPLETE: CPT | Performed by: CLINICAL NURSE SPECIALIST

## 2019-01-01 PROCEDURE — 74177 CT ABD & PELVIS W/CONTRAST: CPT | Performed by: EMERGENCY MEDICINE

## 2019-01-01 PROCEDURE — 0DBA4ZZ EXCISION OF JEJUNUM, PERCUTANEOUS ENDOSCOPIC APPROACH: ICD-10-PCS | Performed by: SURGERY

## 2019-01-01 PROCEDURE — 99233 SBSQ HOSP IP/OBS HIGH 50: CPT | Performed by: INTERNAL MEDICINE

## 2019-01-01 PROCEDURE — 99282 EMERGENCY DEPT VISIT SF MDM: CPT

## 2019-01-01 PROCEDURE — 74177 CT ABD & PELVIS W/CONTRAST: CPT | Performed by: INTERNAL MEDICINE

## 2019-01-01 PROCEDURE — 99232 SBSQ HOSP IP/OBS MODERATE 35: CPT | Performed by: NURSE PRACTITIONER

## 2019-01-01 PROCEDURE — 99214 OFFICE O/P EST MOD 30 MIN: CPT | Performed by: NURSE PRACTITIONER

## 2019-01-01 PROCEDURE — 99225 SUBSEQUENT OBSERVATION CARE: CPT | Performed by: INTERNAL MEDICINE

## 2019-01-01 PROCEDURE — 87040 BLOOD CULTURE FOR BACTERIA: CPT

## 2019-01-01 PROCEDURE — 1111F DSCHRG MED/CURRENT MED MERGE: CPT | Performed by: FAMILY MEDICINE

## 2019-01-01 PROCEDURE — 99231 SBSQ HOSP IP/OBS SF/LOW 25: CPT | Performed by: NURSE PRACTITIONER

## 2019-01-01 PROCEDURE — 96368 THER/DIAG CONCURRENT INF: CPT

## 2019-01-01 PROCEDURE — 71260 CT THORAX DX C+: CPT | Performed by: INTERNAL MEDICINE

## 2019-01-01 PROCEDURE — 99220 INITIAL OBSERVATION CARE,LEVL III: CPT | Performed by: HOSPITALIST

## 2019-01-01 DEVICE — IMPLANTABLE DEVICE: Type: IMPLANTABLE DEVICE | Site: URETER | Status: FUNCTIONAL

## 2019-01-01 RX ORDER — HYDROMORPHONE HYDROCHLORIDE 1 MG/ML
0.8 INJECTION, SOLUTION INTRAMUSCULAR; INTRAVENOUS; SUBCUTANEOUS EVERY 2 HOUR PRN
Status: DISCONTINUED | OUTPATIENT
Start: 2019-01-01 | End: 2019-01-01

## 2019-01-01 RX ORDER — HYDROMORPHONE HYDROCHLORIDE 1 MG/ML
2 INJECTION, SOLUTION INTRAMUSCULAR; INTRAVENOUS; SUBCUTANEOUS ONCE
Status: CANCELLED
Start: 2019-01-01

## 2019-01-01 RX ORDER — SODIUM CHLORIDE 0.9 % (FLUSH) 0.9 %
10 SYRINGE (ML) INJECTION ONCE
Status: COMPLETED | OUTPATIENT
Start: 2019-01-01 | End: 2019-01-01

## 2019-01-01 RX ORDER — DIPHENHYDRAMINE HCL 25 MG
50 CAPSULE ORAL ONCE
Status: COMPLETED | OUTPATIENT
Start: 2019-01-01 | End: 2019-01-01

## 2019-01-01 RX ORDER — DIPHENHYDRAMINE HCL 25 MG
50 CAPSULE ORAL ONCE
Status: CANCELLED | OUTPATIENT
Start: 2019-01-01

## 2019-01-01 RX ORDER — HYDROMORPHONE HYDROCHLORIDE 4 MG/1
4 TABLET ORAL
Qty: 180 TABLET | Refills: 0 | Status: ON HOLD | OUTPATIENT
Start: 2019-01-01 | End: 2019-01-01

## 2019-01-01 RX ORDER — HYDROCODONE BITARTRATE AND ACETAMINOPHEN 10; 325 MG/1; MG/1
1-2 TABLET ORAL EVERY 6 HOURS PRN
Qty: 240 TABLET | Refills: 0 | Status: ON HOLD | OUTPATIENT
Start: 2019-01-01 | End: 2019-01-01

## 2019-01-01 RX ORDER — ACETAMINOPHEN 500 MG
1000 TABLET ORAL EVERY 6 HOURS PRN
Status: DISCONTINUED | OUTPATIENT
Start: 2019-01-01 | End: 2019-01-01

## 2019-01-01 RX ORDER — MORPHINE SULFATE 4 MG/ML
4 INJECTION, SOLUTION INTRAMUSCULAR; INTRAVENOUS EVERY 4 HOURS PRN
Status: DISCONTINUED | OUTPATIENT
Start: 2019-01-01 | End: 2019-01-01

## 2019-01-01 RX ORDER — HYDROMORPHONE HYDROCHLORIDE 1 MG/ML
2 INJECTION, SOLUTION INTRAMUSCULAR; INTRAVENOUS; SUBCUTANEOUS ONCE
Status: COMPLETED | OUTPATIENT
Start: 2019-01-01 | End: 2019-01-01

## 2019-01-01 RX ORDER — PROCHLORPERAZINE EDISYLATE 5 MG/ML
5 INJECTION INTRAMUSCULAR; INTRAVENOUS EVERY 8 HOURS PRN
Status: DISCONTINUED | OUTPATIENT
Start: 2019-01-01 | End: 2019-01-01

## 2019-01-01 RX ORDER — MIDAZOLAM HYDROCHLORIDE 1 MG/ML
1 INJECTION INTRAMUSCULAR; INTRAVENOUS EVERY 5 MIN PRN
Status: DISCONTINUED | OUTPATIENT
Start: 2019-01-01 | End: 2019-01-01 | Stop reason: HOSPADM

## 2019-01-01 RX ORDER — METOCLOPRAMIDE HYDROCHLORIDE 5 MG/ML
10 INJECTION INTRAMUSCULAR; INTRAVENOUS AS NEEDED
Status: DISCONTINUED | OUTPATIENT
Start: 2019-01-01 | End: 2019-01-01 | Stop reason: HOSPADM

## 2019-01-01 RX ORDER — SODIUM CHLORIDE 9 MG/ML
INJECTION, SOLUTION INTRAVENOUS ONCE
Status: COMPLETED | OUTPATIENT
Start: 2019-01-01 | End: 2019-01-01

## 2019-01-01 RX ORDER — DIPHENHYDRAMINE HYDROCHLORIDE 50 MG/ML
12.5 INJECTION INTRAMUSCULAR; INTRAVENOUS AS NEEDED
Status: DISCONTINUED | OUTPATIENT
Start: 2019-01-01 | End: 2019-01-01 | Stop reason: HOSPADM

## 2019-01-01 RX ORDER — LOPERAMIDE HYDROCHLORIDE 2 MG/1
2 CAPSULE ORAL 4 TIMES DAILY PRN
Status: DISCONTINUED | OUTPATIENT
Start: 2019-01-01 | End: 2019-01-01

## 2019-01-01 RX ORDER — SODIUM CHLORIDE 9 MG/ML
INJECTION, SOLUTION INTRAVENOUS AS NEEDED
Status: DISCONTINUED | OUTPATIENT
Start: 2019-01-01 | End: 2019-01-01

## 2019-01-01 RX ORDER — METOCLOPRAMIDE HYDROCHLORIDE 5 MG/ML
10 INJECTION INTRAMUSCULAR; INTRAVENOUS AS NEEDED
Status: DISCONTINUED | OUTPATIENT
Start: 2019-01-01 | End: 2019-01-01

## 2019-01-01 RX ORDER — CEFAZOLIN SODIUM/WATER 2 G/20 ML
2 SYRINGE (ML) INTRAVENOUS ONCE
Status: DISCONTINUED | OUTPATIENT
Start: 2019-01-01 | End: 2019-01-01 | Stop reason: HOSPADM

## 2019-01-01 RX ORDER — ACETAMINOPHEN 325 MG/1
650 TABLET ORAL ONCE
Status: COMPLETED | OUTPATIENT
Start: 2019-01-01 | End: 2019-01-01

## 2019-01-01 RX ORDER — SODIUM CHLORIDE 9 MG/ML
INJECTION, SOLUTION INTRAVENOUS CONTINUOUS
Status: DISCONTINUED | OUTPATIENT
Start: 2019-01-01 | End: 2019-01-01

## 2019-01-01 RX ORDER — ATROPINE SULFATE 0.4 MG/ML
0.2 AMPUL (ML) INJECTION ONCE
Status: CANCELLED | OUTPATIENT
Start: 2019-01-01

## 2019-01-01 RX ORDER — HYDROCODONE BITARTRATE AND ACETAMINOPHEN 10; 325 MG/1; MG/1
1 TABLET ORAL ONCE
Status: CANCELLED
Start: 2019-01-01

## 2019-01-01 RX ORDER — SODIUM CHLORIDE 9 MG/ML
INJECTION, SOLUTION INTRAVENOUS AS NEEDED
Status: CANCELLED
Start: 2019-01-01

## 2019-01-01 RX ORDER — ATROPINE SULFATE 0.4 MG/ML
0.2 AMPUL (ML) INJECTION ONCE
Status: COMPLETED | OUTPATIENT
Start: 2019-01-01 | End: 2019-01-01

## 2019-01-01 RX ORDER — SODIUM CHLORIDE, SODIUM LACTATE, POTASSIUM CHLORIDE, CALCIUM CHLORIDE 600; 310; 30; 20 MG/100ML; MG/100ML; MG/100ML; MG/100ML
INJECTION, SOLUTION INTRAVENOUS CONTINUOUS
Status: DISCONTINUED | OUTPATIENT
Start: 2019-01-01 | End: 2019-01-01

## 2019-01-01 RX ORDER — VANCOMYCIN HYDROCHLORIDE 125 MG/1
125 CAPSULE ORAL 4 TIMES DAILY
Qty: 40 CAPSULE | Refills: 0 | Status: SHIPPED | OUTPATIENT
Start: 2019-01-01 | End: 2019-01-01 | Stop reason: ALTCHOICE

## 2019-01-01 RX ORDER — SODIUM CHLORIDE 0.9 % (FLUSH) 0.9 %
10 SYRINGE (ML) INJECTION ONCE
Status: CANCELLED | OUTPATIENT
Start: 2019-01-01

## 2019-01-01 RX ORDER — LORAZEPAM 2 MG/ML
INJECTION INTRAMUSCULAR EVERY 4 HOURS PRN
Status: CANCELLED | OUTPATIENT
Start: 2019-01-01

## 2019-01-01 RX ORDER — ONDANSETRON 2 MG/ML
4 INJECTION INTRAMUSCULAR; INTRAVENOUS EVERY 4 HOURS PRN
Status: DISCONTINUED | OUTPATIENT
Start: 2019-01-01 | End: 2019-01-01

## 2019-01-01 RX ORDER — HYDROCODONE BITARTRATE AND ACETAMINOPHEN 10; 325 MG/1; MG/1
1 TABLET ORAL EVERY 4 HOURS PRN
Status: DISCONTINUED | OUTPATIENT
Start: 2019-01-01 | End: 2019-01-01

## 2019-01-01 RX ORDER — ACETAMINOPHEN 325 MG/1
650 TABLET ORAL ONCE
Status: CANCELLED | OUTPATIENT
Start: 2019-01-01

## 2019-01-01 RX ORDER — HYDROCODONE BITARTRATE AND ACETAMINOPHEN 5; 325 MG/1; MG/1
1-2 TABLET ORAL EVERY 6 HOURS PRN
Qty: 120 TABLET | Refills: 0 | Status: SHIPPED | OUTPATIENT
Start: 2019-01-01 | End: 2019-01-01 | Stop reason: DRUGHIGH

## 2019-01-01 RX ORDER — MORPHINE SULFATE 4 MG/ML
2 INJECTION, SOLUTION INTRAMUSCULAR; INTRAVENOUS EVERY 4 HOURS PRN
Status: DISCONTINUED | OUTPATIENT
Start: 2019-01-01 | End: 2019-01-01

## 2019-01-01 RX ORDER — ONDANSETRON 2 MG/ML
4 INJECTION INTRAMUSCULAR; INTRAVENOUS EVERY 6 HOURS PRN
Status: DISCONTINUED | OUTPATIENT
Start: 2019-01-01 | End: 2019-01-01 | Stop reason: HOSPADM

## 2019-01-01 RX ORDER — ACETAMINOPHEN 325 MG/1
650 TABLET ORAL EVERY 6 HOURS PRN
Status: DISCONTINUED | OUTPATIENT
Start: 2019-01-01 | End: 2019-01-01

## 2019-01-01 RX ORDER — PROCHLORPERAZINE MALEATE 10 MG
10 TABLET ORAL EVERY 6 HOURS PRN
Status: CANCELLED | OUTPATIENT
Start: 2019-01-01

## 2019-01-01 RX ORDER — LORAZEPAM 2 MG/ML
0.5 INJECTION INTRAMUSCULAR ONCE
Status: COMPLETED | OUTPATIENT
Start: 2019-01-01 | End: 2019-01-01

## 2019-01-01 RX ORDER — METOCLOPRAMIDE HYDROCHLORIDE 5 MG/ML
10 INJECTION INTRAMUSCULAR; INTRAVENOUS EVERY 6 HOURS PRN
Status: CANCELLED | OUTPATIENT
Start: 2019-01-01

## 2019-01-01 RX ORDER — POLYETHYLENE GLYCOL 3350 17 G/17G
17 POWDER, FOR SOLUTION ORAL DAILY PRN
Status: DISCONTINUED | OUTPATIENT
Start: 2019-01-01 | End: 2019-01-01

## 2019-01-01 RX ORDER — HYDROCODONE BITARTRATE AND ACETAMINOPHEN 5; 325 MG/1; MG/1
1 TABLET ORAL EVERY 4 HOURS PRN
Status: DISCONTINUED | OUTPATIENT
Start: 2019-01-01 | End: 2019-01-01

## 2019-01-01 RX ORDER — SODIUM CHLORIDE 9 MG/ML
1000 INJECTION, SOLUTION INTRAVENOUS ONCE
Status: CANCELLED
Start: 2019-01-01

## 2019-01-01 RX ORDER — ALFUZOSIN HYDROCHLORIDE 10 MG/1
10 TABLET, EXTENDED RELEASE ORAL
Qty: 30 TABLET | Refills: 0 | Status: SHIPPED | OUTPATIENT
Start: 2019-01-01 | End: 2019-01-01

## 2019-01-01 RX ORDER — LEVOFLOXACIN 500 MG/1
500 TABLET, FILM COATED ORAL DAILY
Qty: 7 TABLET | Refills: 0 | Status: SHIPPED | OUTPATIENT
Start: 2019-01-01 | End: 2019-01-01

## 2019-01-01 RX ORDER — ENOXAPARIN SODIUM 100 MG/ML
40 INJECTION SUBCUTANEOUS DAILY
Status: DISCONTINUED | OUTPATIENT
Start: 2019-01-01 | End: 2019-01-01

## 2019-01-01 RX ORDER — LORAZEPAM 1 MG/1
1 TABLET ORAL EVERY 4 HOURS PRN
Status: DISCONTINUED | OUTPATIENT
Start: 2019-01-01 | End: 2019-01-01

## 2019-01-01 RX ORDER — METOCLOPRAMIDE HYDROCHLORIDE 5 MG/ML
10 INJECTION INTRAMUSCULAR; INTRAVENOUS EVERY 8 HOURS PRN
Status: DISCONTINUED | OUTPATIENT
Start: 2019-01-01 | End: 2019-01-01 | Stop reason: HOSPADM

## 2019-01-01 RX ORDER — HYDROCODONE BITARTRATE AND ACETAMINOPHEN 5; 325 MG/1; MG/1
1-2 TABLET ORAL EVERY 6 HOURS PRN
Qty: 60 TABLET | Refills: 0 | OUTPATIENT
Start: 2019-01-01 | End: 2019-01-01

## 2019-01-01 RX ORDER — CEFAZOLIN SODIUM/WATER 2 G/20 ML
SYRINGE (ML) INTRAVENOUS
Status: DISCONTINUED | OUTPATIENT
Start: 2019-01-01 | End: 2019-01-01 | Stop reason: HOSPADM

## 2019-01-01 RX ORDER — HYDROCODONE BITARTRATE AND ACETAMINOPHEN 10; 325 MG/1; MG/1
1 TABLET ORAL ONCE
Status: COMPLETED | OUTPATIENT
Start: 2019-01-01 | End: 2019-01-01

## 2019-01-01 RX ORDER — DIPHENOXYLATE HYDROCHLORIDE AND ATROPINE SULFATE 2.5; .025 MG/1; MG/1
1-2 TABLET ORAL 4 TIMES DAILY PRN
Qty: 90 TABLET | Refills: 0 | Status: ON HOLD | OUTPATIENT
Start: 2019-01-01 | End: 2019-01-01

## 2019-01-01 RX ORDER — POTASSIUM CHLORIDE 29.8 MG/ML
40 INJECTION INTRAVENOUS ONCE
Status: COMPLETED | OUTPATIENT
Start: 2019-01-01 | End: 2019-01-01

## 2019-01-01 RX ORDER — SODIUM CHLORIDE, SODIUM LACTATE, POTASSIUM CHLORIDE, CALCIUM CHLORIDE 600; 310; 30; 20 MG/100ML; MG/100ML; MG/100ML; MG/100ML
INJECTION, SOLUTION INTRAVENOUS CONTINUOUS
Status: DISCONTINUED | OUTPATIENT
Start: 2019-01-01 | End: 2019-01-01 | Stop reason: HOSPADM

## 2019-01-01 RX ORDER — POTASSIUM CHLORIDE 20 MEQ/1
40 TABLET, EXTENDED RELEASE ORAL ONCE
Status: DISCONTINUED | OUTPATIENT
Start: 2019-01-01 | End: 2019-01-01

## 2019-01-01 RX ORDER — METRONIDAZOLE 500 MG/100ML
500 INJECTION, SOLUTION INTRAVENOUS ONCE
Status: DISCONTINUED | OUTPATIENT
Start: 2019-01-01 | End: 2019-01-01 | Stop reason: HOSPADM

## 2019-01-01 RX ORDER — ONDANSETRON 2 MG/ML
8 INJECTION INTRAMUSCULAR; INTRAVENOUS EVERY 6 HOURS PRN
Status: CANCELLED | OUTPATIENT
Start: 2019-01-01

## 2019-01-01 RX ORDER — HYDROMORPHONE HYDROCHLORIDE 1 MG/ML
0.4 INJECTION, SOLUTION INTRAMUSCULAR; INTRAVENOUS; SUBCUTANEOUS EVERY 2 HOUR PRN
Status: DISCONTINUED | OUTPATIENT
Start: 2019-01-01 | End: 2019-01-01

## 2019-01-01 RX ORDER — POTASSIUM CHLORIDE 14.9 MG/ML
20 INJECTION INTRAVENOUS ONCE
Status: COMPLETED | OUTPATIENT
Start: 2019-01-01 | End: 2019-01-01

## 2019-01-01 RX ORDER — MIDAZOLAM HYDROCHLORIDE 1 MG/ML
INJECTION INTRAMUSCULAR; INTRAVENOUS
Status: COMPLETED
Start: 2019-01-01 | End: 2019-01-01

## 2019-01-01 RX ORDER — FENTANYL 25 UG/H
1 PATCH TRANSDERMAL
Qty: 10 PATCH | Refills: 0 | Status: SHIPPED | OUTPATIENT
Start: 2019-01-01 | End: 2019-01-01

## 2019-01-01 RX ORDER — ALFUZOSIN HYDROCHLORIDE 10 MG/1
10 TABLET, EXTENDED RELEASE ORAL
Status: DISCONTINUED | OUTPATIENT
Start: 2019-01-01 | End: 2019-01-01

## 2019-01-01 RX ORDER — DEXAMETHASONE SODIUM PHOSPHATE 4 MG/ML
4 VIAL (ML) INJECTION ONCE
Status: DISCONTINUED | OUTPATIENT
Start: 2019-01-01 | End: 2019-01-01 | Stop reason: SDUPTHER

## 2019-01-01 RX ORDER — HYDROCODONE BITARTRATE AND ACETAMINOPHEN 5; 325 MG/1; MG/1
1 TABLET ORAL AS NEEDED
Status: DISCONTINUED | OUTPATIENT
Start: 2019-01-01 | End: 2019-01-01 | Stop reason: HOSPADM

## 2019-01-01 RX ORDER — POTASSIUM CHLORIDE 750 MG/1
10 TABLET, EXTENDED RELEASE ORAL 2 TIMES DAILY
Qty: 60 TABLET | Refills: 5 | Status: ON HOLD | OUTPATIENT
Start: 2019-01-01 | End: 2019-01-01

## 2019-01-01 RX ORDER — FENTANYL 50 UG/H
1 PATCH TRANSDERMAL
Qty: 10 PATCH | Refills: 0 | Status: SHIPPED | OUTPATIENT
Start: 2019-01-01 | End: 2019-01-01

## 2019-01-01 RX ORDER — MEPERIDINE HYDROCHLORIDE 25 MG/ML
12.5 INJECTION INTRAMUSCULAR; INTRAVENOUS; SUBCUTANEOUS AS NEEDED
Status: DISCONTINUED | OUTPATIENT
Start: 2019-01-01 | End: 2019-01-01 | Stop reason: HOSPADM

## 2019-01-01 RX ORDER — LABETALOL HYDROCHLORIDE 5 MG/ML
5 INJECTION, SOLUTION INTRAVENOUS EVERY 5 MIN PRN
Status: DISCONTINUED | OUTPATIENT
Start: 2019-01-01 | End: 2019-01-01

## 2019-01-01 RX ORDER — VANCOMYCIN HYDROCHLORIDE 125 MG/1
125 CAPSULE ORAL 4 TIMES DAILY
Qty: 40 CAPSULE | Refills: 3 | Status: SHIPPED | OUTPATIENT
Start: 2019-01-01 | End: 2019-01-01

## 2019-01-01 RX ORDER — METHENAMINE, SODIUM PHOSPHATE, MONOBASIC, MONOHYDRATE, PHENYL SALICYLATE, METHYLENE BLUE, AND HYOSCYAMINE SULFATE 120; 40.8; 36; 10; .12 MG/1; MG/1; MG/1; MG/1; MG/1
CAPSULE ORAL
Qty: 30 CAPSULE | Refills: 0 | Status: SHIPPED | OUTPATIENT
Start: 2019-01-01 | End: 2019-01-01

## 2019-01-01 RX ORDER — ACETAMINOPHEN 500 MG
1000 TABLET ORAL ONCE AS NEEDED
Status: DISCONTINUED | OUTPATIENT
Start: 2019-01-01 | End: 2019-01-01

## 2019-01-01 RX ORDER — HYOSCYAMINE SULFATE 0.125 MG
125 TABLET ORAL EVERY 4 HOURS PRN
Qty: 60 TABLET | Refills: 5 | Status: ON HOLD | OUTPATIENT
Start: 2019-01-01 | End: 2019-01-01

## 2019-01-01 RX ORDER — LIDOCAINE HYDROCHLORIDE 10 MG/ML
INJECTION, SOLUTION INFILTRATION; PERINEURAL
Status: COMPLETED
Start: 2019-01-01 | End: 2019-01-01

## 2019-01-01 RX ORDER — FENTANYL 100 UG/H
1 PATCH TRANSDERMAL
Qty: 3 PATCH | Refills: 0 | Status: SHIPPED | OUTPATIENT
Start: 2019-01-01

## 2019-01-01 RX ORDER — LORAZEPAM 2 MG/ML
0.5 INJECTION INTRAMUSCULAR ONCE
Status: CANCELLED
Start: 2019-01-01

## 2019-01-01 RX ORDER — CYCLOBENZAPRINE HCL 10 MG
10 TABLET ORAL 3 TIMES DAILY PRN
Status: DISCONTINUED | OUTPATIENT
Start: 2019-01-01 | End: 2019-01-01

## 2019-01-01 RX ORDER — NALOXONE HYDROCHLORIDE 0.4 MG/ML
80 INJECTION, SOLUTION INTRAMUSCULAR; INTRAVENOUS; SUBCUTANEOUS AS NEEDED
Status: DISCONTINUED | OUTPATIENT
Start: 2019-01-01 | End: 2019-01-01

## 2019-01-01 RX ORDER — FENTANYL 25 UG/H
1 PATCH TRANSDERMAL
COMMUNITY
End: 2019-01-01

## 2019-01-01 RX ORDER — MORPHINE 10 MG/ML
0.6 TINCTURE ORAL EVERY 4 HOURS PRN
Qty: 118 ML | Refills: 0 | Status: SHIPPED | OUTPATIENT
Start: 2019-01-01 | End: 2019-01-01

## 2019-01-01 RX ORDER — PROCHLORPERAZINE EDISYLATE 5 MG/ML
10 INJECTION INTRAMUSCULAR; INTRAVENOUS EVERY 6 HOURS PRN
Status: DISCONTINUED | OUTPATIENT
Start: 2019-01-01 | End: 2019-01-01 | Stop reason: HOSPADM

## 2019-01-01 RX ORDER — DEXAMETHASONE SODIUM PHOSPHATE 4 MG/ML
4 VIAL (ML) INJECTION AS NEEDED
Status: DISCONTINUED | OUTPATIENT
Start: 2019-01-01 | End: 2019-01-01

## 2019-01-01 RX ORDER — METRONIDAZOLE 500 MG/1
500 TABLET ORAL 3 TIMES DAILY
Qty: 30 TABLET | Refills: 0 | Status: SHIPPED | OUTPATIENT
Start: 2019-01-01 | End: 2019-01-01 | Stop reason: CLARIF

## 2019-01-01 RX ORDER — CHLORPROMAZINE HYDROCHLORIDE 25 MG/1
25 TABLET, FILM COATED ORAL 3 TIMES DAILY PRN
Status: DISCONTINUED | OUTPATIENT
Start: 2019-01-01 | End: 2019-01-01 | Stop reason: HOSPADM

## 2019-01-01 RX ORDER — METHADONE HYDROCHLORIDE 10 MG/1
20 TABLET ORAL EVERY 8 HOURS PRN
Status: ON HOLD | COMMUNITY
End: 2019-01-01

## 2019-01-01 RX ORDER — LORAZEPAM 0.5 MG/1
TABLET ORAL EVERY 4 HOURS PRN
Status: CANCELLED | OUTPATIENT
Start: 2019-01-01

## 2019-01-01 RX ORDER — ONDANSETRON 2 MG/ML
4 INJECTION INTRAMUSCULAR; INTRAVENOUS ONCE
Status: COMPLETED | OUTPATIENT
Start: 2019-01-01 | End: 2019-01-01

## 2019-01-01 RX ORDER — HYDROMORPHONE HYDROCHLORIDE 4 MG/1
4 TABLET ORAL EVERY 4 HOURS PRN
Qty: 120 TABLET | Refills: 0 | Status: SHIPPED | OUTPATIENT
Start: 2019-01-01 | End: 2019-01-01

## 2019-01-01 RX ORDER — ENOXAPARIN SODIUM 100 MG/ML
40 INJECTION SUBCUTANEOUS EVERY EVENING
Status: DISCONTINUED | OUTPATIENT
Start: 2019-01-01 | End: 2019-01-01

## 2019-01-01 RX ORDER — ONDANSETRON 2 MG/ML
4 INJECTION INTRAMUSCULAR; INTRAVENOUS EVERY 6 HOURS PRN
Status: DISCONTINUED | OUTPATIENT
Start: 2019-01-01 | End: 2019-01-01

## 2019-01-01 RX ORDER — IBUPROFEN 600 MG/1
600 TABLET ORAL ONCE AS NEEDED
Status: DISCONTINUED | OUTPATIENT
Start: 2019-01-01 | End: 2019-01-01

## 2019-01-01 RX ORDER — HYOSCYAMINE SULFATE 0.125 MG
0.12 TABLET,DISINTEGRATING ORAL EVERY 4 HOURS PRN
Status: DISCONTINUED | OUTPATIENT
Start: 2019-01-01 | End: 2019-01-01

## 2019-01-01 RX ORDER — METHENAMINE, SODIUM PHOSPHATE, MONOBASIC, MONOHYDRATE, PHENYL SALICYLATE, METHYLENE BLUE, AND HYOSCYAMINE SULFATE 120; 40.8; 36; 10; .12 MG/1; MG/1; MG/1; MG/1; MG/1
1 CAPSULE ORAL 3 TIMES DAILY
Qty: 15 CAPSULE | Refills: 3 | Status: CANCELLED
Start: 2019-01-01 | End: 2019-01-01

## 2019-01-01 RX ORDER — HYDROCODONE BITARTRATE AND ACETAMINOPHEN 5; 325 MG/1; MG/1
2 TABLET ORAL AS NEEDED
Status: COMPLETED | OUTPATIENT
Start: 2019-01-01 | End: 2019-01-01

## 2019-01-01 RX ORDER — MINOCYCLINE HYDROCHLORIDE 100 MG/1
100 TABLET ORAL 2 TIMES DAILY
Status: DISCONTINUED | OUTPATIENT
Start: 2019-01-01 | End: 2019-01-01

## 2019-01-01 RX ORDER — LOPERAMIDE HYDROCHLORIDE 2 MG/1
2 CAPSULE ORAL
Refills: 0 | Status: SHIPPED | COMMUNITY
Start: 2019-01-01

## 2019-01-01 RX ORDER — SODIUM CHLORIDE 9 MG/ML
1000 INJECTION, SOLUTION INTRAVENOUS ONCE
Status: COMPLETED | OUTPATIENT
Start: 2019-01-01 | End: 2019-01-01

## 2019-01-01 RX ORDER — LORAZEPAM 2 MG/ML
0.5 INJECTION INTRAMUSCULAR EVERY 6 HOURS PRN
Status: DISCONTINUED | OUTPATIENT
Start: 2019-01-01 | End: 2019-01-01

## 2019-01-01 RX ORDER — ENOXAPARIN SODIUM 100 MG/ML
40 INJECTION SUBCUTANEOUS NIGHTLY
Status: DISCONTINUED | OUTPATIENT
Start: 2019-01-01 | End: 2019-01-01

## 2019-01-01 RX ORDER — ACETAMINOPHEN 500 MG
1000 TABLET ORAL EVERY 6 HOURS PRN
COMMUNITY

## 2019-01-01 RX ORDER — HYDROCODONE BITARTRATE AND ACETAMINOPHEN 5; 325 MG/1; MG/1
1-2 TABLET ORAL EVERY 6 HOURS PRN
Qty: 120 TABLET | Refills: 0 | Status: SHIPPED | OUTPATIENT
Start: 2019-01-01 | End: 2019-01-01

## 2019-01-01 RX ORDER — FAMOTIDINE 10 MG/ML
20 INJECTION, SOLUTION INTRAVENOUS DAILY
Status: DISCONTINUED | OUTPATIENT
Start: 2019-01-01 | End: 2019-01-01

## 2019-01-01 RX ORDER — HYDROMORPHONE HYDROCHLORIDE 1 MG/ML
0.2 INJECTION, SOLUTION INTRAMUSCULAR; INTRAVENOUS; SUBCUTANEOUS EVERY 2 HOUR PRN
Status: DISCONTINUED | OUTPATIENT
Start: 2019-01-01 | End: 2019-01-01

## 2019-01-01 RX ORDER — LIDOCAINE HYDROCHLORIDE 20 MG/ML
JELLY TOPICAL AS NEEDED
Status: DISCONTINUED | OUTPATIENT
Start: 2019-01-01 | End: 2019-01-01

## 2019-01-01 RX ORDER — DOCUSATE SODIUM 100 MG/1
100 CAPSULE, LIQUID FILLED ORAL 2 TIMES DAILY
Status: DISCONTINUED | OUTPATIENT
Start: 2019-01-01 | End: 2019-01-01

## 2019-01-01 RX ORDER — PROCHLORPERAZINE MALEATE 10 MG
10 TABLET ORAL EVERY 6 HOURS PRN
Status: DISCONTINUED | OUTPATIENT
Start: 2019-01-01 | End: 2019-01-01

## 2019-01-01 RX ORDER — CEFAZOLIN SODIUM/WATER 2 G/20 ML
2 SYRINGE (ML) INTRAVENOUS
Status: DISPENSED | OUTPATIENT
Start: 2019-01-01 | End: 2019-01-01

## 2019-01-01 RX ORDER — CEFAZOLIN SODIUM/WATER 2 G/20 ML
SYRINGE (ML) INTRAVENOUS
Status: DISCONTINUED | OUTPATIENT
Start: 2019-01-01 | End: 2019-01-01

## 2019-01-01 RX ORDER — MIRTAZAPINE 15 MG/1
15 TABLET, FILM COATED ORAL NIGHTLY
Status: DISCONTINUED | OUTPATIENT
Start: 2019-01-01 | End: 2019-01-01

## 2019-01-01 RX ORDER — FENTANYL 75 UG/H
1 PATCH TRANSDERMAL
Qty: 10 PATCH | Refills: 0 | Status: SHIPPED | OUTPATIENT
Start: 2019-01-01 | End: 2019-01-01

## 2019-01-01 RX ORDER — LEVOFLOXACIN 5 MG/ML
INJECTION, SOLUTION INTRAVENOUS
Status: COMPLETED
Start: 2019-01-01 | End: 2019-01-01

## 2019-01-01 RX ORDER — SODIUM CHLORIDE 0.9 % (FLUSH) 0.9 %
10 SYRINGE (ML) INJECTION ONCE
Status: DISCONTINUED | OUTPATIENT
Start: 2019-01-01 | End: 2019-01-01

## 2019-01-01 RX ORDER — MORPHINE SULFATE 20 MG/ML
5 SOLUTION ORAL
Status: DISCONTINUED | OUTPATIENT
Start: 2019-01-01 | End: 2019-01-01

## 2019-01-01 RX ORDER — DEXAMETHASONE SODIUM PHOSPHATE 4 MG/ML
4 VIAL (ML) INJECTION ONCE
Status: CANCELLED
Start: 2019-01-01

## 2019-01-01 RX ORDER — FENTANYL 25 UG/H
1 PATCH TRANSDERMAL
Status: DISCONTINUED | OUTPATIENT
Start: 2019-01-01 | End: 2019-01-01

## 2019-01-01 RX ORDER — HYDROMORPHONE HYDROCHLORIDE 1 MG/ML
0.4 INJECTION, SOLUTION INTRAMUSCULAR; INTRAVENOUS; SUBCUTANEOUS EVERY 5 MIN PRN
Status: DISCONTINUED | OUTPATIENT
Start: 2019-01-01 | End: 2019-01-01 | Stop reason: HOSPADM

## 2019-01-01 RX ORDER — NALOXONE HYDROCHLORIDE 0.4 MG/ML
80 INJECTION, SOLUTION INTRAMUSCULAR; INTRAVENOUS; SUBCUTANEOUS AS NEEDED
Status: DISCONTINUED | OUTPATIENT
Start: 2019-01-01 | End: 2019-01-01 | Stop reason: HOSPADM

## 2019-01-01 RX ORDER — FENTANYL 12 UG/H
1 PATCH TRANSDERMAL
Qty: 10 PATCH | Refills: 0 | Status: SHIPPED | OUTPATIENT
Start: 2019-01-01 | End: 2019-01-01 | Stop reason: DRUGHIGH

## 2019-01-01 RX ORDER — POTASSIUM CHLORIDE 20 MEQ/1
40 TABLET, EXTENDED RELEASE ORAL EVERY 4 HOURS
Status: COMPLETED | OUTPATIENT
Start: 2019-01-01 | End: 2019-01-01

## 2019-01-01 RX ORDER — POLYETHYLENE GLYCOL 3350 17 G/17G
17 POWDER, FOR SOLUTION ORAL 3 TIMES DAILY
Status: DISCONTINUED | OUTPATIENT
Start: 2019-01-01 | End: 2019-01-01

## 2019-01-01 RX ORDER — MORPHINE SULFATE 4 MG/ML
2 INJECTION, SOLUTION INTRAMUSCULAR; INTRAVENOUS EVERY 2 HOUR PRN
Status: DISCONTINUED | OUTPATIENT
Start: 2019-01-01 | End: 2019-01-01

## 2019-01-01 RX ORDER — LIDOCAINE HYDROCHLORIDE 20 MG/ML
JELLY TOPICAL AS NEEDED
Status: DISCONTINUED | OUTPATIENT
Start: 2019-01-01 | End: 2019-01-01 | Stop reason: HOSPADM

## 2019-01-01 RX ORDER — PANTOPRAZOLE SODIUM 40 MG/1
40 TABLET, DELAYED RELEASE ORAL
Status: DISCONTINUED | OUTPATIENT
Start: 2019-01-01 | End: 2019-01-01

## 2019-01-01 RX ORDER — MORPHINE SULFATE 15 MG/1
15 TABLET, FILM COATED, EXTENDED RELEASE ORAL EVERY 12 HOURS SCHEDULED
Qty: 60 TABLET | Refills: 0 | Status: SHIPPED | OUTPATIENT
Start: 2019-01-01

## 2019-01-01 RX ORDER — ONDANSETRON 2 MG/ML
4 INJECTION INTRAMUSCULAR; INTRAVENOUS AS NEEDED
Status: DISCONTINUED | OUTPATIENT
Start: 2019-01-01 | End: 2019-01-01

## 2019-01-01 RX ORDER — LOPERAMIDE HYDROCHLORIDE 2 MG/1
4 CAPSULE ORAL ONCE
Status: COMPLETED | OUTPATIENT
Start: 2019-01-01 | End: 2019-01-01

## 2019-01-01 RX ORDER — HYDROCODONE BITARTRATE AND ACETAMINOPHEN 5; 325 MG/1; MG/1
1 TABLET ORAL AS NEEDED
Status: COMPLETED | OUTPATIENT
Start: 2019-01-01 | End: 2019-01-01

## 2019-01-01 RX ORDER — KETOROLAC TROMETHAMINE 30 MG/ML
15 INJECTION, SOLUTION INTRAMUSCULAR; INTRAVENOUS EVERY 6 HOURS PRN
Status: ACTIVE | OUTPATIENT
Start: 2019-01-01 | End: 2019-01-01

## 2019-01-01 RX ORDER — AMOXICILLIN 500 MG/1
500 TABLET, FILM COATED ORAL EVERY 8 HOURS
Qty: 30 TABLET | Refills: 0 | Status: SHIPPED | OUTPATIENT
Start: 2019-01-01 | End: 2019-01-01 | Stop reason: ALTCHOICE

## 2019-01-01 RX ORDER — MORPHINE SULFATE 4 MG/ML
4 INJECTION, SOLUTION INTRAMUSCULAR; INTRAVENOUS ONCE
Status: COMPLETED | OUTPATIENT
Start: 2019-01-01 | End: 2019-01-01

## 2019-01-01 RX ORDER — FENTANYL 100 UG/H
1 PATCH TRANSDERMAL
Status: ON HOLD | COMMUNITY
End: 2019-01-01

## 2019-01-01 RX ORDER — HYDROMORPHONE HYDROCHLORIDE 2 MG/1
2 TABLET ORAL EVERY 4 HOURS PRN
Status: DISCONTINUED | OUTPATIENT
Start: 2019-01-01 | End: 2019-01-01

## 2019-01-01 RX ORDER — HYDROCODONE BITARTRATE AND ACETAMINOPHEN 5; 325 MG/1; MG/1
1-2 TABLET ORAL EVERY 6 HOURS PRN
Status: DISCONTINUED | OUTPATIENT
Start: 2019-01-01 | End: 2019-01-01

## 2019-01-01 RX ORDER — FAMOTIDINE 10 MG/ML
20 INJECTION, SOLUTION INTRAVENOUS DAILY
Status: DISCONTINUED | OUTPATIENT
Start: 2019-01-01 | End: 2019-01-01 | Stop reason: HOSPADM

## 2019-01-01 RX ORDER — MORPHINE SULFATE 4 MG/ML
2 INJECTION, SOLUTION INTRAMUSCULAR; INTRAVENOUS EVERY 5 MIN PRN
Status: DISCONTINUED | OUTPATIENT
Start: 2019-01-01 | End: 2019-01-01 | Stop reason: HOSPADM

## 2019-01-01 RX ORDER — CHLORPROMAZINE HYDROCHLORIDE 25 MG/1
25 TABLET, FILM COATED ORAL 3 TIMES DAILY PRN
Status: DISCONTINUED | OUTPATIENT
Start: 2019-01-01 | End: 2019-01-01

## 2019-01-01 RX ORDER — ONDANSETRON HYDROCHLORIDE 8 MG/1
8 TABLET, FILM COATED ORAL EVERY 8 HOURS PRN
Qty: 30 TABLET | Refills: 11 | Status: SHIPPED | OUTPATIENT
Start: 2019-01-01

## 2019-01-01 RX ORDER — DEXTROSE, SODIUM CHLORIDE, AND POTASSIUM CHLORIDE 5; .45; .15 G/100ML; G/100ML; G/100ML
INJECTION INTRAVENOUS CONTINUOUS
Status: DISCONTINUED | OUTPATIENT
Start: 2019-01-01 | End: 2019-01-01

## 2019-01-01 RX ORDER — LORAZEPAM 0.5 MG/1
0.5 TABLET ORAL EVERY 6 HOURS PRN
Qty: 60 TABLET | Refills: 0 | Status: ON HOLD | OUTPATIENT
Start: 2019-01-01 | End: 2019-01-01

## 2019-01-01 RX ORDER — ACETAMINOPHEN 325 MG/1
650 TABLET ORAL EVERY 6 HOURS PRN
Status: DISCONTINUED | OUTPATIENT
Start: 2019-01-01 | End: 2019-01-01 | Stop reason: HOSPADM

## 2019-01-01 RX ORDER — LOPERAMIDE HYDROCHLORIDE 2 MG/1
2 CAPSULE ORAL ONCE
Status: COMPLETED | OUTPATIENT
Start: 2019-01-01 | End: 2019-01-01

## 2019-01-01 RX ORDER — METHENAMINE, SODIUM PHOSPHATE, MONOBASIC, MONOHYDRATE, PHENYL SALICYLATE, METHYLENE BLUE, AND HYOSCYAMINE SULFATE 120; 40.8; 36; 10; .12 MG/1; MG/1; MG/1; MG/1; MG/1
1 CAPSULE ORAL 3 TIMES DAILY PRN
Status: DISCONTINUED | OUTPATIENT
Start: 2019-01-01 | End: 2019-01-01

## 2019-01-01 RX ORDER — FENTANYL 25 UG/H
2 PATCH TRANSDERMAL
Qty: 1 PATCH | Refills: 0 | OUTPATIENT
Start: 2019-01-01 | End: 2019-01-01 | Stop reason: DRUGHIGH

## 2019-01-01 RX ORDER — HYDROMORPHONE HYDROCHLORIDE 4 MG/1
4 TABLET ORAL
Qty: 30 TABLET | Refills: 0 | Status: SHIPPED | OUTPATIENT
Start: 2019-01-01

## 2019-01-01 RX ORDER — SODIUM CHLORIDE 9 MG/ML
INJECTION, SOLUTION INTRAVENOUS CONTINUOUS
Status: ACTIVE | OUTPATIENT
Start: 2019-01-01 | End: 2019-01-01

## 2019-01-01 RX ORDER — MORPHINE SULFATE 4 MG/ML
INJECTION, SOLUTION INTRAMUSCULAR; INTRAVENOUS
Status: COMPLETED
Start: 2019-01-01 | End: 2019-01-01

## 2019-01-01 RX ORDER — ACETAMINOPHEN 500 MG
1000 TABLET ORAL ONCE
Status: DISCONTINUED | OUTPATIENT
Start: 2019-01-01 | End: 2019-01-01 | Stop reason: HOSPADM

## 2019-01-01 RX ORDER — MINOCYCLINE HYDROCHLORIDE 100 MG/1
100 TABLET ORAL 2 TIMES DAILY
Qty: 60 TABLET | Refills: 0 | Status: SHIPPED | OUTPATIENT
Start: 2019-01-01 | End: 2019-01-01 | Stop reason: ALTCHOICE

## 2019-01-01 RX ORDER — MORPHINE SULFATE 4 MG/ML
4 INJECTION, SOLUTION INTRAMUSCULAR; INTRAVENOUS EVERY 2 HOUR PRN
Status: DISCONTINUED | OUTPATIENT
Start: 2019-01-01 | End: 2019-01-01

## 2019-01-01 RX ORDER — DIPHENOXYLATE HYDROCHLORIDE AND ATROPINE SULFATE 2.5; .025 MG/1; MG/1
1-2 TABLET ORAL 4 TIMES DAILY PRN
Qty: 90 TABLET | Refills: 0 | Status: SHIPPED | OUTPATIENT
Start: 2019-01-01 | End: 2019-01-01

## 2019-01-01 RX ORDER — HYDROCODONE BITARTRATE AND ACETAMINOPHEN 5; 325 MG/1; MG/1
2 TABLET ORAL EVERY 4 HOURS PRN
Status: DISCONTINUED | OUTPATIENT
Start: 2019-01-01 | End: 2019-01-01

## 2019-01-01 RX ORDER — FENTANYL 100 UG/H
1 PATCH TRANSDERMAL
Status: DISCONTINUED | OUTPATIENT
Start: 2019-01-01 | End: 2019-01-01

## 2019-01-01 RX ORDER — BISACODYL 10 MG
10 SUPPOSITORY, RECTAL RECTAL
Status: DISCONTINUED | OUTPATIENT
Start: 2019-01-01 | End: 2019-01-01

## 2019-01-01 RX ORDER — METRONIDAZOLE 500 MG/100ML
500 INJECTION, SOLUTION INTRAVENOUS ONCE
Status: DISCONTINUED | OUTPATIENT
Start: 2019-01-01 | End: 2019-01-01

## 2019-01-01 RX ORDER — MINOCYCLINE HYDROCHLORIDE 50 MG/1
50 CAPSULE ORAL 2 TIMES DAILY
Qty: 60 CAPSULE | Refills: 5 | Status: ON HOLD | OUTPATIENT
Start: 2019-01-01 | End: 2019-01-01

## 2019-01-01 RX ORDER — HYDROMORPHONE HYDROCHLORIDE 1 MG/ML
0.5 INJECTION, SOLUTION INTRAMUSCULAR; INTRAVENOUS; SUBCUTANEOUS ONCE
Status: COMPLETED | OUTPATIENT
Start: 2019-01-01 | End: 2019-01-01

## 2019-01-01 RX ORDER — HYDROCODONE BITARTRATE AND ACETAMINOPHEN 5; 325 MG/1; MG/1
2 TABLET ORAL AS NEEDED
Status: DISCONTINUED | OUTPATIENT
Start: 2019-01-01 | End: 2019-01-01 | Stop reason: HOSPADM

## 2019-01-01 RX ORDER — POLYETHYLENE GLYCOL 3350 17 G/17G
17 POWDER, FOR SOLUTION ORAL DAILY
Status: DISCONTINUED | OUTPATIENT
Start: 2019-01-01 | End: 2019-01-01

## 2019-01-01 RX ORDER — ONDANSETRON 2 MG/ML
INJECTION INTRAMUSCULAR; INTRAVENOUS
Status: COMPLETED
Start: 2019-01-01 | End: 2019-01-01

## 2019-01-01 RX ORDER — MORPHINE 10 MG/ML
0.6 TINCTURE ORAL EVERY 4 HOURS PRN
Qty: 118 ML | Refills: 0 | Status: SHIPPED
Start: 2019-01-01 | End: 2019-01-01

## 2019-01-01 RX ORDER — MEPERIDINE HYDROCHLORIDE 25 MG/ML
12.5 INJECTION INTRAMUSCULAR; INTRAVENOUS; SUBCUTANEOUS AS NEEDED
Status: DISCONTINUED | OUTPATIENT
Start: 2019-01-01 | End: 2019-01-01

## 2019-01-01 RX ORDER — HYDROMORPHONE HYDROCHLORIDE 1 MG/ML
0.4 INJECTION, SOLUTION INTRAMUSCULAR; INTRAVENOUS; SUBCUTANEOUS EVERY 5 MIN PRN
Status: DISCONTINUED | OUTPATIENT
Start: 2019-01-01 | End: 2019-01-01

## 2019-01-01 RX ORDER — AMOXICILLIN 500 MG/1
CAPSULE ORAL
Refills: 0 | COMMUNITY
Start: 2019-01-01 | End: 2019-01-01

## 2019-01-01 RX ORDER — METHADONE HYDROCHLORIDE 10 MG/1
20 TABLET ORAL EVERY 8 HOURS PRN
Status: CANCELLED | OUTPATIENT
Start: 2019-01-01

## 2019-01-01 RX ORDER — ONDANSETRON 4 MG/1
8 TABLET, FILM COATED ORAL EVERY 8 HOURS PRN
Status: DISCONTINUED | OUTPATIENT
Start: 2019-01-01 | End: 2019-01-01

## 2019-01-01 RX ORDER — FENTANYL 50 UG/H
1 PATCH TRANSDERMAL
Status: DISCONTINUED | OUTPATIENT
Start: 2019-01-01 | End: 2019-01-01

## 2019-01-01 RX ORDER — POLYETHYLENE GLYCOL 3350 17 G/17G
17 POWDER, FOR SOLUTION ORAL 2 TIMES DAILY
Status: DISCONTINUED | OUTPATIENT
Start: 2019-01-01 | End: 2019-01-01

## 2019-01-01 RX ORDER — HYDROMORPHONE HYDROCHLORIDE 2 MG/1
2 TABLET ORAL EVERY 4 HOURS PRN
Qty: 120 TABLET | Refills: 0 | Status: SHIPPED | OUTPATIENT
Start: 2019-01-01 | End: 2019-01-01 | Stop reason: DRUGHIGH

## 2019-01-01 RX ORDER — CYCLOBENZAPRINE HCL 10 MG
10 TABLET ORAL 3 TIMES DAILY PRN
Qty: 20 TABLET | Refills: 0 | Status: SHIPPED | OUTPATIENT
Start: 2019-01-01

## 2019-01-01 RX ORDER — LORAZEPAM 2 MG/ML
1 INJECTION INTRAMUSCULAR EVERY 4 HOURS PRN
Status: DISCONTINUED | OUTPATIENT
Start: 2019-01-01 | End: 2019-01-01

## 2019-01-01 RX ORDER — POLYETHYLENE GLYCOL 3350 17 G/17G
17 POWDER, FOR SOLUTION ORAL DAILY
Qty: 30 EACH | Refills: 1 | Status: SHIPPED | OUTPATIENT
Start: 2019-01-01

## 2019-01-01 RX ORDER — DIAZEPAM 5 MG/1
5 TABLET ORAL EVERY 8 HOURS PRN
Qty: 20 TABLET | Refills: 0 | Status: SHIPPED | OUTPATIENT
Start: 2019-01-01 | End: 2019-01-01 | Stop reason: ALTCHOICE

## 2019-01-01 RX ORDER — LOPERAMIDE HYDROCHLORIDE 2 MG/1
2 CAPSULE ORAL 4 TIMES DAILY PRN
Refills: 0 | Status: SHIPPED | COMMUNITY
Start: 2019-01-01 | End: 2019-01-01

## 2019-01-01 RX ORDER — METOCLOPRAMIDE HYDROCHLORIDE 5 MG/ML
10 INJECTION INTRAMUSCULAR; INTRAVENOUS EVERY 8 HOURS PRN
Status: DISCONTINUED | OUTPATIENT
Start: 2019-01-01 | End: 2019-01-01

## 2019-01-01 RX ORDER — KETOROLAC TROMETHAMINE 30 MG/ML
30 INJECTION, SOLUTION INTRAMUSCULAR; INTRAVENOUS EVERY 6 HOURS PRN
Status: DISPENSED | OUTPATIENT
Start: 2019-01-01 | End: 2019-01-01

## 2019-01-01 RX ORDER — HYDROMORPHONE HYDROCHLORIDE 1 MG/ML
1 INJECTION, SOLUTION INTRAMUSCULAR; INTRAVENOUS; SUBCUTANEOUS EVERY 2 HOUR PRN
Status: DISCONTINUED | OUTPATIENT
Start: 2019-01-01 | End: 2019-01-01

## 2019-01-01 RX ORDER — POLYETHYLENE GLYCOL 3350 17 G/17G
17 POWDER, FOR SOLUTION ORAL ONCE
Status: COMPLETED | OUTPATIENT
Start: 2019-01-01 | End: 2019-01-01

## 2019-01-01 RX ORDER — MORPHINE SULFATE 20 MG/ML
5 SOLUTION ORAL
Qty: 180 ML | Refills: 0 | Status: SHIPPED | OUTPATIENT
Start: 2019-01-01

## 2019-01-01 RX ORDER — SODIUM CHLORIDE 9 MG/ML
INJECTION, SOLUTION INTRAVENOUS ONCE
Status: CANCELLED
Start: 2019-01-01

## 2019-01-01 RX ORDER — HYOSCYAMINE SULFATE 0.125 MG
125 TABLET ORAL EVERY 4 HOURS PRN
Qty: 60 TABLET | Refills: 0 | Status: SHIPPED | OUTPATIENT
Start: 2019-01-01 | End: 2019-01-01

## 2019-01-01 RX ORDER — LORAZEPAM 0.5 MG/1
1 TABLET ORAL EVERY 4 HOURS PRN
Qty: 15 TABLET | Refills: 0 | Status: SHIPPED | OUTPATIENT
Start: 2019-01-01

## 2019-01-01 RX ORDER — MORPHINE SULFATE 4 MG/ML
4 INJECTION, SOLUTION INTRAMUSCULAR; INTRAVENOUS EVERY 30 MIN PRN
Status: DISCONTINUED | OUTPATIENT
Start: 2019-01-01 | End: 2019-01-01

## 2019-01-01 RX ORDER — POTASSIUM CHLORIDE 20 MEQ/1
40 TABLET, EXTENDED RELEASE ORAL ONCE
Status: COMPLETED | OUTPATIENT
Start: 2019-01-01 | End: 2019-01-01

## 2019-01-01 RX ORDER — MORPHINE SULFATE 15 MG/1
15 TABLET, FILM COATED, EXTENDED RELEASE ORAL EVERY 12 HOURS SCHEDULED
Status: DISCONTINUED | OUTPATIENT
Start: 2019-01-01 | End: 2019-01-01

## 2019-01-01 RX ORDER — ONDANSETRON 2 MG/ML
4 INJECTION INTRAMUSCULAR; INTRAVENOUS AS NEEDED
Status: DISCONTINUED | OUTPATIENT
Start: 2019-01-01 | End: 2019-01-01 | Stop reason: HOSPADM

## 2019-01-01 RX ORDER — SODIUM PHOSPHATE, DIBASIC AND SODIUM PHOSPHATE, MONOBASIC 7; 19 G/133ML; G/133ML
1 ENEMA RECTAL ONCE AS NEEDED
Status: DISCONTINUED | OUTPATIENT
Start: 2019-01-01 | End: 2019-01-01

## 2019-01-01 RX ORDER — HYDROCODONE BITARTRATE AND ACETAMINOPHEN 10; 325 MG/1; MG/1
1-2 TABLET ORAL EVERY 6 HOURS PRN
Status: DISCONTINUED | OUTPATIENT
Start: 2019-01-01 | End: 2019-01-01

## 2019-01-01 RX ORDER — MAGNESIUM CARB/ALUMINUM HYDROX 105-160MG
296 TABLET,CHEWABLE ORAL ONCE
Status: COMPLETED | OUTPATIENT
Start: 2019-01-01 | End: 2019-01-01

## 2019-01-01 RX ORDER — POTASSIUM CHLORIDE 750 MG/1
10 TABLET, EXTENDED RELEASE ORAL 2 TIMES DAILY
Status: DISCONTINUED | OUTPATIENT
Start: 2019-01-01 | End: 2019-01-01

## 2019-01-01 RX ORDER — HYDROMORPHONE HYDROCHLORIDE 4 MG/1
4 TABLET ORAL
Status: DISCONTINUED | OUTPATIENT
Start: 2019-01-01 | End: 2019-01-01

## 2019-01-01 RX ORDER — METOCLOPRAMIDE HYDROCHLORIDE 5 MG/ML
10 INJECTION INTRAMUSCULAR; INTRAVENOUS EVERY 6 HOURS PRN
Status: DISCONTINUED | OUTPATIENT
Start: 2019-01-01 | End: 2019-01-01

## 2019-01-01 RX ORDER — DIPHENOXYLATE HYDROCHLORIDE AND ATROPINE SULFATE 2.5; .025 MG/1; MG/1
1-2 TABLET ORAL 4 TIMES DAILY PRN
Qty: 60 TABLET | Refills: 0 | Status: SHIPPED | OUTPATIENT
Start: 2019-01-01 | End: 2019-01-01

## 2019-01-01 RX ORDER — BUPIVACAINE HYDROCHLORIDE 5 MG/ML
INJECTION, SOLUTION EPIDURAL; INTRACAUDAL AS NEEDED
Status: DISCONTINUED | OUTPATIENT
Start: 2019-01-01 | End: 2019-01-01

## 2019-01-01 RX ORDER — SIMETHICONE 80 MG
80 TABLET,CHEWABLE ORAL 4 TIMES DAILY PRN
Status: DISCONTINUED | OUTPATIENT
Start: 2019-01-01 | End: 2019-01-01

## 2019-01-01 RX ORDER — MORPHINE SULFATE 4 MG/ML
8 INJECTION, SOLUTION INTRAMUSCULAR; INTRAVENOUS EVERY 2 HOUR PRN
Status: DISCONTINUED | OUTPATIENT
Start: 2019-01-01 | End: 2019-01-01

## 2019-01-01 RX ORDER — HYDROCODONE BITARTRATE AND ACETAMINOPHEN 10; 325 MG/1; MG/1
1-2 TABLET ORAL EVERY 4 HOURS PRN
Qty: 180 TABLET | Refills: 0 | Status: ON HOLD | OUTPATIENT
Start: 2019-01-01 | End: 2019-01-01

## 2019-01-01 RX ORDER — HYDROMORPHONE HYDROCHLORIDE 1 MG/ML
INJECTION, SOLUTION INTRAMUSCULAR; INTRAVENOUS; SUBCUTANEOUS
Status: COMPLETED
Start: 2019-01-01 | End: 2019-01-01

## 2019-01-01 RX ORDER — MINOCYCLINE HYDROCHLORIDE 100 MG/1
100 CAPSULE ORAL 2 TIMES DAILY
Refills: 0 | COMMUNITY
Start: 2019-01-01 | End: 2019-01-01

## 2019-01-01 RX ORDER — HEPARIN SODIUM 5000 [USP'U]/ML
5000 INJECTION, SOLUTION INTRAVENOUS; SUBCUTANEOUS EVERY 12 HOURS SCHEDULED
Status: DISCONTINUED | OUTPATIENT
Start: 2019-01-01 | End: 2019-01-01 | Stop reason: HOSPADM

## 2019-01-01 RX ORDER — LEVOFLOXACIN 500 MG/1
500 TABLET, FILM COATED ORAL DAILY
Qty: 7 TABLET | Refills: 0 | Status: SHIPPED | OUTPATIENT
Start: 2019-01-01 | End: 2019-01-01 | Stop reason: ALTCHOICE

## 2019-01-01 RX ORDER — MAGNESIUM SULFATE HEPTAHYDRATE 40 MG/ML
2 INJECTION, SOLUTION INTRAVENOUS ONCE
Status: COMPLETED | OUTPATIENT
Start: 2019-01-01 | End: 2019-01-01

## 2019-01-01 RX ORDER — SIMETHICONE 80 MG
80 TABLET,CHEWABLE ORAL
Status: DISCONTINUED | OUTPATIENT
Start: 2019-01-01 | End: 2019-01-01

## 2019-01-01 RX ORDER — HYDROCODONE BITARTRATE AND ACETAMINOPHEN 10; 325 MG/1; MG/1
1-2 TABLET ORAL EVERY 6 HOURS PRN
Qty: 120 TABLET | Refills: 0 | Status: SHIPPED | OUTPATIENT
Start: 2019-01-01 | End: 2019-01-01

## 2019-01-01 RX ORDER — LORAZEPAM 0.5 MG/1
0.5 TABLET ORAL EVERY 6 HOURS PRN
Status: DISCONTINUED | OUTPATIENT
Start: 2019-01-01 | End: 2019-01-01

## 2019-01-01 RX ADMIN — SODIUM CHLORIDE 0.9 % (FLUSH) 10 ML: 0.9 % SYRINGE (ML) INJECTION at 16:25:00

## 2019-01-01 RX ADMIN — HYDROMORPHONE HYDROCHLORIDE 2 MG: 1 INJECTION, SOLUTION INTRAMUSCULAR; INTRAVENOUS; SUBCUTANEOUS at 16:05:00

## 2019-01-01 RX ADMIN — SODIUM CHLORIDE 0.9 % (FLUSH) 10 ML: 0.9 % SYRINGE (ML) INJECTION at 15:20:00

## 2019-01-01 RX ADMIN — LOPERAMIDE HYDROCHLORIDE 2 MG: 2 CAPSULE ORAL at 13:32:00

## 2019-01-01 RX ADMIN — SODIUM CHLORIDE 0.9 % (FLUSH) 10 ML: 0.9 % SYRINGE (ML) INJECTION at 16:05:00

## 2019-01-01 RX ADMIN — SODIUM CHLORIDE 0.9 % (FLUSH) 10 ML: 0.9 % SYRINGE (ML) INJECTION at 15:10:00

## 2019-01-01 RX ADMIN — ATROPINE SULFATE 0.2 MG: 0.4 MG/ML AMPUL (ML) INJECTION at 14:23:00

## 2019-01-01 RX ADMIN — SODIUM CHLORIDE 0.9 % (FLUSH) 10 ML: 0.9 % SYRINGE (ML) INJECTION at 14:35:00

## 2019-01-01 RX ADMIN — DIPHENHYDRAMINE HCL 50 MG: 25 MG CAPSULE ORAL at 12:21:00

## 2019-01-01 RX ADMIN — LOPERAMIDE HYDROCHLORIDE 4 MG: 2 CAPSULE ORAL at 12:47:00

## 2019-01-01 RX ADMIN — SODIUM CHLORIDE 0.9 % (FLUSH) 10 ML: 0.9 % SYRINGE (ML) INJECTION at 10:15:00

## 2019-01-01 RX ADMIN — ACETAMINOPHEN 650 MG: 325 TABLET ORAL at 12:56:00

## 2019-01-01 RX ADMIN — SODIUM CHLORIDE 0.9 % (FLUSH) 10 ML: 0.9 % SYRINGE (ML) INJECTION at 11:30:00

## 2019-01-01 RX ADMIN — DIPHENHYDRAMINE HCL 50 MG: 25 MG CAPSULE ORAL at 13:02:00

## 2019-01-01 RX ADMIN — DIPHENHYDRAMINE HCL 50 MG: 25 MG CAPSULE ORAL at 12:30:00

## 2019-01-01 RX ADMIN — SODIUM CHLORIDE 0.9 % (FLUSH) 10 ML: 0.9 % SYRINGE (ML) INJECTION at 13:00:00

## 2019-01-01 RX ADMIN — ATROPINE SULFATE 0.2 MG: 0.4 MG/ML AMPUL (ML) INJECTION at 14:50:00

## 2019-01-01 RX ADMIN — HYDROMORPHONE HYDROCHLORIDE 2 MG: 1 INJECTION, SOLUTION INTRAMUSCULAR; INTRAVENOUS; SUBCUTANEOUS at 11:44:00

## 2019-01-01 RX ADMIN — ATROPINE SULFATE 0.2 MG: 0.4 MG/ML AMPUL (ML) INJECTION at 15:33:00

## 2019-01-01 RX ADMIN — ACETAMINOPHEN 650 MG: 325 TABLET ORAL at 11:35:00

## 2019-01-01 RX ADMIN — SODIUM CHLORIDE 0.9 % (FLUSH) 10 ML: 0.9 % SYRINGE (ML) INJECTION at 11:53:00

## 2019-01-01 RX ADMIN — ATROPINE SULFATE 0.2 MG: 0.4 MG/ML AMPUL (ML) INJECTION at 13:22:00

## 2019-01-01 RX ADMIN — SODIUM CHLORIDE 1000 ML: 9 INJECTION, SOLUTION INTRAVENOUS at 10:30:00

## 2019-01-01 RX ADMIN — LORAZEPAM 0.5 MG: 2 INJECTION INTRAMUSCULAR at 10:43:00

## 2019-01-01 RX ADMIN — HYDROMORPHONE HYDROCHLORIDE 2 MG: 1 INJECTION, SOLUTION INTRAMUSCULAR; INTRAVENOUS; SUBCUTANEOUS at 13:09:00

## 2019-01-01 RX ADMIN — SODIUM CHLORIDE: 9 INJECTION, SOLUTION INTRAVENOUS at 14:45:00

## 2019-01-01 RX ADMIN — SODIUM CHLORIDE 0.9 % (FLUSH) 10 ML: 0.9 % SYRINGE (ML) INJECTION at 12:15:00

## 2019-01-01 RX ADMIN — DIPHENHYDRAMINE HCL 50 MG: 25 MG CAPSULE ORAL at 14:05:00

## 2019-01-01 RX ADMIN — DIPHENHYDRAMINE HCL 50 MG: 25 MG CAPSULE ORAL at 12:56:00

## 2019-01-01 RX ADMIN — ATROPINE SULFATE 0.2 MG: 0.4 MG/ML AMPUL (ML) INJECTION at 11:13:00

## 2019-01-01 RX ADMIN — SODIUM CHLORIDE 0.9 % (FLUSH) 10 ML: 0.9 % SYRINGE (ML) INJECTION at 12:57:00

## 2019-01-01 RX ADMIN — ATROPINE SULFATE 0.2 MG: 0.4 MG/ML AMPUL (ML) INJECTION at 14:44:00

## 2019-01-01 RX ADMIN — ONDANSETRON 4 MG: 2 INJECTION INTRAMUSCULAR; INTRAVENOUS at 10:53:00

## 2019-01-01 RX ADMIN — SODIUM CHLORIDE 0.9 % (FLUSH) 10 ML: 0.9 % SYRINGE (ML) INJECTION at 14:45:00

## 2019-01-01 RX ADMIN — ATROPINE SULFATE 0.2 MG: 0.4 MG/ML AMPUL (ML) INJECTION at 14:05:00

## 2019-01-01 RX ADMIN — ACETAMINOPHEN 650 MG: 325 TABLET ORAL at 12:03:00

## 2019-01-01 RX ADMIN — DIPHENHYDRAMINE HCL 50 MG: 25 MG CAPSULE ORAL at 11:26:00

## 2019-01-01 RX ADMIN — LORAZEPAM 0.5 MG: 2 INJECTION INTRAMUSCULAR at 12:55:00

## 2019-01-01 RX ADMIN — ACETAMINOPHEN 650 MG: 325 TABLET ORAL at 11:26:00

## 2019-01-01 RX ADMIN — ATROPINE SULFATE 0.2 MG: 0.4 MG/ML AMPUL (ML) INJECTION at 13:40:00

## 2019-01-01 RX ADMIN — SODIUM CHLORIDE: 9 INJECTION, SOLUTION INTRAVENOUS at 14:15:00

## 2019-01-01 RX ADMIN — ATROPINE SULFATE 0.2 MG: 0.4 MG/ML AMPUL (ML) INJECTION at 12:54:00

## 2019-01-01 RX ADMIN — SODIUM CHLORIDE 1000 ML: 9 INJECTION, SOLUTION INTRAVENOUS at 15:06:00

## 2019-01-01 RX ADMIN — ACETAMINOPHEN 650 MG: 325 TABLET ORAL at 12:21:00

## 2019-01-01 RX ADMIN — DIPHENHYDRAMINE HCL 50 MG: 25 MG CAPSULE ORAL at 11:35:00

## 2019-01-01 RX ADMIN — SODIUM CHLORIDE 0.9 % (FLUSH) 10 ML: 0.9 % SYRINGE (ML) INJECTION at 14:29:00

## 2019-01-01 RX ADMIN — ACETAMINOPHEN 650 MG: 325 TABLET ORAL at 14:05:00

## 2019-01-01 RX ADMIN — SODIUM CHLORIDE: 9 INJECTION, SOLUTION INTRAVENOUS at 14:01:00

## 2019-01-01 RX ADMIN — SODIUM CHLORIDE 1000 ML: 9 INJECTION, SOLUTION INTRAVENOUS at 10:47:00

## 2019-01-01 RX ADMIN — HYDROMORPHONE HYDROCHLORIDE 2 MG: 1 INJECTION, SOLUTION INTRAMUSCULAR; INTRAVENOUS; SUBCUTANEOUS at 12:07:00

## 2019-01-01 RX ADMIN — SODIUM CHLORIDE 1000 ML: 9 INJECTION, SOLUTION INTRAVENOUS at 14:27:00

## 2019-01-01 RX ADMIN — HYDROMORPHONE HYDROCHLORIDE 2 MG: 1 INJECTION, SOLUTION INTRAMUSCULAR; INTRAVENOUS; SUBCUTANEOUS at 13:47:00

## 2019-01-01 RX ADMIN — DIPHENHYDRAMINE HCL 50 MG: 25 MG CAPSULE ORAL at 12:02:00

## 2019-01-01 RX ADMIN — SODIUM CHLORIDE: 9 INJECTION, SOLUTION INTRAVENOUS at 09:45:00

## 2019-01-01 RX ADMIN — HYDROCODONE BITARTRATE AND ACETAMINOPHEN 1 TABLET: 10; 325 TABLET ORAL at 14:15:00

## 2019-01-01 RX ADMIN — ACETAMINOPHEN 650 MG: 325 TABLET ORAL at 13:02:00

## 2019-01-01 RX ADMIN — HYDROCODONE BITARTRATE AND ACETAMINOPHEN 0.5 TABLET: 10; 325 TABLET ORAL at 14:31:00

## 2019-01-01 RX ADMIN — ACETAMINOPHEN 650 MG: 325 TABLET ORAL at 12:30:00

## 2019-01-01 RX ADMIN — SODIUM CHLORIDE 0.9 % (FLUSH) 10 ML: 0.9 % SYRINGE (ML) INJECTION at 11:32:00

## 2019-01-01 RX ADMIN — LORAZEPAM 0.5 MG: 2 INJECTION INTRAMUSCULAR at 14:27:00

## 2019-01-02 NOTE — TELEPHONE ENCOUNTER
Date of Treatment: 12/27/18                             Chemo: Erbitux. Irinotecan    Comments: Spoke with patient. He is feeling OK today. Had Vomiting x1 episode. Regularly diarrhea- taking Imodium and Lomotil as prescribed.  He denies any fevers or chill

## 2019-01-08 NOTE — TELEPHONE ENCOUNTER
Received request for an H&P to be completed for pt's Cystoscopy, Left ureteral stent exchange on 3/15/19 with Dr Sidney Silvestre.   Called pt and scheduled H&P.

## 2019-01-08 NOTE — TELEPHONE ENCOUNTER
Patient calling about urinary results. Left VM that urology ordered Keflex and Uribel until sensitivities come back, encouraged him to fill and take and to call back with any questions.

## 2019-01-08 NOTE — PROGRESS NOTES
Okay for empiric keflex while waiting for results  Previous cultures have been negative so may just be stent related symptoms  Stress hydration, avoid irritants, uribel prn   Thanks

## 2019-01-17 NOTE — PROGRESS NOTES
Pt here for C1D15 Erbitux/Irinotecan. Pt c/o increase abdominal pain over the past week. States pain is intermittent. Some constipation. Last BM today. Taking Norco twice a day for pain. One episode of vomiting after last weeks chemotherapy tx.    Discussed

## 2019-01-17 NOTE — PROGRESS NOTES
Pt here for C1D15.   Arrives Ambulating independently, accompanied by            Modifications in dose or schedule: No     Frequency of blood return and site check throughout administration: Prior to administration   Discharged to Home, Ambulating independe

## 2019-01-31 NOTE — PROGRESS NOTES
Patient is here for MD f/u and cycle 2 of chemo. Patient started Zelboraf one week ago today. Patient c/o chronic abdominal cramping and fatigue this week. Patient was prescribed Levsin a few weeks ago but never picked it up from the pharmacy.  Decrease arvind

## 2019-02-05 NOTE — PROGRESS NOTES
[facility]    PATIENT'S NAME: Fern Queen   ATTENDING PHYSICIAN: LESLIE Blue    PATIENT ACCOUNT #: [de-identified] LOCATION: [address] [place] [address] RECORD #: [id number] YOB: 1960   DATE OF SERVICE: 01/31/2019       [facility] P well-developed, chronically ill-appearing male. VITAL SIGNS:  His performance status is 1. Weight 162 pounds, which is down 5 pounds from 2 weeks ago. Blood pressure 152/89, pulse 90, respiratory rate 20, temperature 96.9. HEENT:  Mild facial rash.

## 2019-02-11 PROBLEM — K59.00 CONSTIPATION: Status: ACTIVE | Noted: 2019-01-01

## 2019-02-11 PROBLEM — R31.0 GROSS HEMATURIA: Status: ACTIVE | Noted: 2019-01-01

## 2019-02-11 PROBLEM — N30.01 ACUTE CYSTITIS WITH HEMATURIA: Status: ACTIVE | Noted: 2019-01-01

## 2019-02-11 NOTE — H&P
[facility] HOSPITALIST  History and Physical     Mook Martínez Patient Status:  Emergency    1960 MRN [id number]   Location [facility] Attending Parveen Nolan MD   Hosp Day # 0 [facility],      Chief Comp surgery football injury in HS   • OTHER SURGICAL HISTORY  09/01/2018    S/p cystoscopy, retrograde pyelogram, stent insertion, left   • OTHER SURGICAL HISTORY  11/27/2018    CYSTOSCOPY, LEFT URETERAL STENT REMOVAL EXCHANGE, LEFT RETROGRADE PYLOGRAM   • PAR Take 1 tablet (10 mg total) by mouth every 6 (six) hours as needed for Nausea. Disp: 30 tablet Rfl: 3   Ondansetron HCl (ZOFRAN) 8 MG tablet Take 1 tablet (8 mg total) by mouth every 8 (eight) hours as needed for Nausea.  Disp: 30 tablet Rfl: 3   AZO-CRANBE (based on SCr of 0.93 mg/dL). No results for input(s): PTP, INR in the last 168 hours. No results for input(s): TROP, CK in the last 168 hours. Imaging: Imaging data reviewed in Epic. ASSESSMENT / PLAN:     1. Abd Pain/UTI  1.  Continue IV abx

## 2019-02-11 NOTE — CONSULTS
Hem/Onc Report of Consultation    Patient Name: Claritza Tyler   YOB: 1960   Medical Record Number: [id number]   CSN: [id number]   Consulting Physician: Dr. Noe Wiggins   Referring Provider(s): Dr. Jeramy Wolfe   Date of Consultation: 2/11/2019 left kidney   • Syncope    • Visual impairment     glasses       Past Surgical History:  Past Surgical History:   Procedure Laterality Date   • COLONOSCOPY     • COLONOSCOPY, POSSIBLE BIOPSY, POSSIBLE POLYPECTOMY 74363 N/A 7/31/2017    Performed by Julianna Renner, Sexual Activity      Alcohol use:  Yes        Alcohol/week: 0.0 oz        Comment: Occasional      Drug use: No      Sexual activity: Not on file    Other Topics      Concerns:        Caffeine Concern: No        Exercise: Yes          4 days per week acetaminophen (TYLENOL) tab 650 mg 650 mg Oral Q6H PRN   ondansetron HCl (ZOFRAN) injection 4 mg 4 mg Intravenous Q6H PRN   Metoclopramide HCl (REGLAN) injection 10 mg 10 mg Intravenous Q8H PRN   [START ON 2/12/2019] CefTRIAXone Sodium (ROCEPHIN) 1 g in 0.125 MG Oral Tab Take 1 tablet (125 mcg total) by mouth every 4 (four) hours as needed for Cramping.    Meth-Hyo-M Bl-Na Phos-Ph Sal (URIBEL) 118 MG Oral Cap Take 1 capsule TID prn   Minocycline HCl 100 MG Oral Tab Take 1 tablet (100 mg total) by mouth 2 ( Grossly intact. Lymphatics: There is no palpable lymphadenopathy in the cervical or supraclavicular regions.   Psych/Depression: mood and affect are appropriate    Labs:  Recent Results (from the past 24 hour(s))   [address]    Collection Time: 02 Collection Time: 02/11/19  8:21 AM   Result Value Ref Range    WBC 4.9 4.0 - 11.0 x10(3) uL    RBC 3.71 (L) 4.30 - 5.70 x10(6)uL    HGB 11.6 (L) 13.0 - 17.5 g/dL    HCT 34.6 (L) 39.0 - 53.0 %    .0 150.0 - 450.0 10(3)uL    MCV 93.3 80.0 - 100.0 fL hematuria. CONTRAST USED:  88cc of Omnipaque 350     FINDINGS:    Hepatic metastatic lesions show increase in size. Lesion in the right hepatic dome image 16 measures 22 x 19 mm, previously 16 x 15 mm.   Left hepatic lobe lesion 22 x 18 mm, previously likely contributing. Dr. Laurie Okeefe to see. Electronically Signed by:    Shante Lares NP-C  Nurse Practitioner  [facility]      Patient seen and examined.   Has an indwelling left sided ureteral stent due to tumor along his left

## 2019-02-11 NOTE — ED PROVIDER NOTES
Patient Seen in: [facility] Emergency Department    History   Patient presents with:  Abdomen/Flank Pain (GI/)    Stated Complaint: abd pain    HPI    28-year-old white male who presents to the emergency room today for complaint of abdominal pain. left   • OTHER SURGICAL HISTORY  11/27/2018    CYSTOSCOPY, LEFT URETERAL STENT REMOVAL EXCHANGE, LEFT RETROGRADE PYLOGRAM   • PART REMOVAL COLON W END COLOSTOMY     • PORT, INDWELLING, IMP Right            Social History    Tobacco Use      Smoking status: Potassium 3.3 (*)     BUN/CREA Ratio 9.7 (*)     Alkaline Phosphatase 208 (*)     Albumin 2.6 (*)     A/G Ratio 0.6 (*)     All other components within normal limits   URINALYSIS WITH CULTURE REFLEX - Abnormal; Notable for the following components:    Urin psoas muscle minimally increased in size, currently 25 x 19 mm, previously 22 x 18 mm.  The left hydronephrosis is less than before.  Left-sided nephro ureteral stent present with the upper pigtail in the left   renal pelvis as before, in the lower portion hospital for further workup and evaluation. He was given some IV antibiotics for possible urinary tract infection. I contacted the urologist and they have seen him in the past that will follow malaise in the hospital as well.   I spoke to the hospitalist

## 2019-02-11 NOTE — PROGRESS NOTES
NURSING ADMISSION NOTE      Patient admitted via Cart  Oriented to room. Safety precautions initiated. Bed in low position. Call light in reach. Pt AOx4. C/o abdominal pain 7/10. Loss of appetite.  Hematuria and burning with urination--states that

## 2019-02-12 NOTE — PAYOR COMM NOTE
EDWARD  ADMISSION REVIEW     Payor: [address] [place] [address] #:  [id number]  Authorization Number:  [id number]      ED Provider Notes        Patient Seen in: [facility] Emergency Department    History   Patient presents with:  A shoulder surgery football injury in HS   • OTHER SURGICAL HISTORY  09/01/2018    S/p cystoscopy, retrograde pyelogram, stent insertion, left   • OTHER SURGICAL HISTORY  11/27/2018    CYSTOSCOPY, LEFT URETERAL STENT REMOVAL EXCHANGE, LEFT RETROGRADE PYLOGRA DIFFERENTIAL WITH PLATELET.   Procedure                               Abnormality         Status                     ---------                               -----------         ------                     CBC W/ DIFFERENTIAL[[id number]]          Abnormal mass.  Mild increase in the size of retroperitoneal mass anterior to the left so us muscle.  Decrease in hydronephrosis, but there is now some   urothelial enhancement involving the left renal pelvis and proximal ureter, could reflect superimposed infectio agent for now  4. CT reviewd with worsening disease      2/11 HEME/ONC  Patient of Dr. Mahoney Offer since 8/2018 with BRAF-mutated metastatic colon cancer. He began treatment with chemoradiation 8/31/17 with oral capecitabine and RT ending 10/11/17.  He had a l consitipation related. Needs to stay on miralax - will decrease to daily dosing.    2)  Met colon cancer - while CT shows minimal enlargement of lesions, CEA has dropped substantially.   Will plan to continue current regimen with reduction in irinotecan do

## 2019-02-12 NOTE — CONSULTS
[facility] Urology   Consultation Note    Lorrie Carey Patient Status:  Inpatient    1960 MRN [id number]   [facility] 4NW-A Attending Daja Curry MD   Hosp Day # 1 PCP SUSIE MISTRY,      Reason for Consultat Performed by Wyline Ormond, MD at [address] [place] [address] Left 11/27/2018    Performed by Veronica López MD at [address] [place] [address],  [address] Left 9/1/2018    Performed by Veronica López MD at [facility] MAIN OR   • FL Oral, BID  •  URIBEL 118 MG CAPS 1 capsule, 1 capsule, Oral, TID PRN  •  0.9%  NaCl infusion, , Intravenous, Continuous  •  acetaminophen (TYLENOL) tab 650 mg, 650 mg, Oral, Q6H PRN  •  ondansetron HCl (ZOFRAN) injection 4 mg, 4 mg, Intravenous, Q6H PRN  • size.  Lesion in the right hepatic dome image 16 measures 22 x 19 mm, previously 16 x 15 mm. Left hepatic lobe lesion 22 x 18 mm, previously 17 x 16 mm.      Retroperitoneal mass anterior to the left psoas muscle minimally increased in size, currently 25 x Hyponatremia     Hyperglycemia     Hydronephrosis     Acute kidney insufficiency     Hydronephrosis, unspecified hydronephrosis type     Ureteral obstruction     Pain due to ureteral stent, initial encounter (HCC)     Obstruction of left ureter     Hydrone

## 2019-02-12 NOTE — DISCHARGE SUMMARY
[facility] HOSPITALIST  DISCHARGE SUMMARY     [facility] Patient Status:  Inpatient    1960 MRN [id number]   [facility] 4NW-A Attending Ryan Zurita MD   [address] Day # 1 PCP SUSIE MISTRY DO     Date of Admission: 2019 Tabs  Commonly known as:  cyclobenzaprine      Take 1 tablet (10 mg total) by mouth 3 (three) times daily as needed for Muscle spasms. Quantity:  20 tablet  Refills:  0     PEG 3350 Pack  Commonly known as:  MIRALAX      Take 17 g by mouth daily.    Linnyln Press ZELBORAF      Take 4 tablets (960 mg total) by mouth 2 (two) times daily. Quantity:  240 tablet  Refills:  11     VITAMIN D OR      Take 1,000 Units by mouth daily.    Refills:  0           Where to Get Your Medications      These medications were sent to [facility] in [facility] 8    Location Instructions: Your appointment is scheduled at the [facility] in [place]. The address is José [address] Bora Mendoza [address] [place] [address].  Please register at the [address] [place] [address]

## 2019-02-12 NOTE — PROGRESS NOTES
[facility] HOSPITALIST  Progress Note     Kelly Harrington Patient Status:  Inpatient    1960 MRN [id number]   [facility] 4NW-A Attending Hanane Miranda MD   Hosp Day # 1 PCP [address] [place] [address]     Chief Complaint: abd pain    S: Pa / PLAN:     1. Abd Pain  1. Likely due to constipation, continue bowel regimen  2. Culture negative, stop abx  3. Urology to [facility] given known mass  4. CT reviewed  2. Metastatic Colorectal Cancer  1. Continue pain control  2.  Urology and Oncology to [facility]

## 2019-02-12 NOTE — PROGRESS NOTES
Heme/Onc Progress Note    Patient Name: Lucia Billy   YOB: 1960   Medical Record Number: [id number]   CSN: [id number]   Attending Physician: Maryuri Fernandez M.D. Subjective:  Had large stool output overnight. Abd feels much better. injection 2 mg, 2 mg, Intravenous, Q4H PRN  •  PEG 3350 (MIRALAX) powder packet 17 g, 17 g, Oral, TID    Physical Examination:  General: Patient is alert and oriented x 3, not in acute distress.   Vital Signs: /68 (BP Location: Right arm)   Pulse 84 mg/dL    Total Protein 6.9 6.4 - 8.2 g/dL    Albumin 2.6 (L) 3.1 - 4.5 g/dL    Globulin  4.3 2.8 - 4.4 g/dL    A/G Ratio 0.6 (L) 1.0 - 2.0   URINALYSIS WITH CULTURE REFLEX    Collection Time: 02/11/19  8:21 AM   Result Value Ref Range    Urine Color Honey Shook None Seen None Seen   COMP METABOLIC PANEL (14)    Collection Time: 02/12/19  5:26 AM   Result Value Ref Range    Glucose 94 70 - 99 mg/dL    Sodium 140 136 - 144 mmol/L    Potassium 3.8 3.6 - 5.1 mmol/L    Chloride 108 101 - 111 mmol/L    CO2 26.0 22.0 - Granulocyte % 0.5 %       Radiology:  No new imaging overnight. Impression and Plan:  1)  Abd pain - has improved with cathartics. Likely consitipation related. Needs to stay on miralax - will decrease to daily dosing.     2)  Met colon cancer - while C

## 2019-02-15 NOTE — PROGRESS NOTES
ANP Visit Note    Patient Name: Jennifer Pryor   YOB: 1960   Medical Record Number: [id number]   CSN: [id number]   Date of visit: 2/15/2019       Chief Complaint/Reason for Visit:  Metastatic Colon Cancer, Chemotherapy     Oncologic History: to antineoplastic chemotherapy     Intractable abdominal pain     SHELL (acute kidney injury) ([facility] [address]     Pyelonephritis     Acute pyelonephritis     Colorectal cancer ([facility] [address]     Hypokalemia     Acute cystitis with hematuria     Constipation     Gross hematuria Diabetes Brother    • Cancer Sister         melanoma       Social History:  Social History    Socioeconomic History      Marital status:       Spouse name: Not on file      Number of children: Not on file      Years of education: Not on file      Hi (URIBEL) 118 MG Oral Cap, Take 1 capsule TID prn, Disp: 30 capsule, Rfl: 0  •  PEG 3350 Oral Powd Pack, Take 17 g by mouth daily. , Disp: 30 each, Rfl: 1  •  triamcinolone acetonide 0.1 % External Cream, Apply 1 Application topically 2 (two) times daily.  Ap °C) (Tympanic)   Resp 18   Wt 74.4 kg (164 lb)   SpO2 96%   BMI 23.53 kg/m²   HEENT: EOMs intact. PERRL. Oropharynx is clear. Neck: No JVD. No palpable lymphadenopathy. Neck is supple. Chest: Clear to auscultation. Heart: Regular rate and rhythm.    Abd 32.3 31.0 - 37.0 g/dL    RDW 14.6 11.0 - 15.0 %    RDW-SD 49.7 (H) 35.1 - 46.3 fL    Neutrophil Absolute Prelim 2.05 1.50 - 7.70 x10 (3) uL    Neutrophil Absolute 2.05 1.50 - 7.70 x10(3) uL    Lymphocyte Absolute 0.35 (L) 1.00 - 4.00 x10(3) uL    Monocyte

## 2019-02-15 NOTE — PAYOR COMM NOTE
JAYJAY  CONTINUED STAY REVIEW    Payor: [address] [place] [address] #:  [id number]  Authorization Number:  [id number]    Actual meds ordered -   Cyclobenzaprine  Rocephin  Heparin flush  Morhpine  Zofran  Duragesic patch  Norco  Levsin Veronique Hodges MD at [facility] ENDOSCOPY   • FAVIOLA [address]AS. [address]. EXT/INT Left 11/2018   • OTHER         colostomy reversal done 5/25/18   • OTHER SURGICAL HISTORY         left shoulder surgery football injury in HS   • OTHER SURGICAL HISTORY   09/01/2018 Pulse 84   Temp 98.6 °F (37 °C) (Oral)   Resp 16   Ht 5' 10\" (1.778 m)   Wt 160 lb 4.8 oz (72.7 kg)   SpO2 95%   BMI 23.00 kg/m²   GENERAL: The patient is sitting up in chair in no acute distress. HEENT: Unremarkable. NECK: Supple.    LUNGS: non-labore Acute pyelonephritis     Colorectal cancer ([facility] [address]     Hypokalemia     Acute cystitis with hematuria     Constipation     Gross hematuria        Abdominal pain - likely related to constipation.   Continue with bowel regimen  Left hydronephrosis - managed wi

## 2019-02-22 PROBLEM — K56.7 ILEUS FOLLOWING GASTROINTESTINAL SURGERY (HCC): Status: ACTIVE | Noted: 2017-12-26

## 2019-02-22 PROBLEM — D64.9 CHRONIC ANEMIA: Status: ACTIVE | Noted: 2018-01-05

## 2019-02-22 PROBLEM — Z43.2 ATTENTION TO ILEOSTOMY (HCC): Status: ACTIVE | Noted: 2018-05-28

## 2019-02-22 PROBLEM — E44.1 MILD PROTEIN-CALORIE MALNUTRITION (HCC): Status: ACTIVE | Noted: 2018-05-28

## 2019-02-22 PROBLEM — K91.89 ILEUS FOLLOWING GASTROINTESTINAL SURGERY (HCC): Status: ACTIVE | Noted: 2017-12-26

## 2019-02-22 NOTE — PROGRESS NOTES
[facility] Family Medicine Office Note  Chief Complaint:   Patient presents with:  Physical      HPI:   This is a 62year old male coming in for preop medical clearance for cystoscopy with left ureteral stent exchange to be done by Dr. Kya Nieto CYSTOSCOPY, LEFT URETERAL STENT REMOVAL EXCHANGE, LEFT RETROGRADE PYLOGRAM   • PART REMOVAL COLON W END COLOSTOMY     • PORT, INDWELLING, IMP Right      Social History:  Social History    Tobacco Use      Smoking status: Never Smoker      Smokeless tobacco Take 1 tablet (10 mg total) by mouth 3 (three) times daily as needed for Muscle spasms. Disp: 20 tablet Rfl: 0   triamcinolone acetonide 0.1 % External Cream Apply 1 Application topically 2 (two) times daily.  Apply to body twice daily Disp: 453.6 g Rfl: 2 ulcerations, no dental abnormalities noted.   NECK:  Supple, no LAD  LUNGS: clear to auscultation bilaterally, no rales/rhonchi/wheezing  HEART:  Regular rate and rhythm, no murmurs, rubs or gallops  ABDOMEN:  Soft, nondistended, nontender, bowel sounds nor of perirectal soft tissue     Intractable migraine without aura     Hyponatremia     Hyperglycemia     Hydronephrosis     Acute kidney insufficiency     Hydronephrosis, unspecified hydronephrosis type     Ureteral obstruction     Pain due to ureteral stent

## 2019-02-28 NOTE — PROGRESS NOTES
Pt here for C3D1.   Arrives Ambulating independently, accompanied by Self           Modifications in dose or schedule: No     Frequency of blood return and site check throughout administration: Prior to administration   Discharged to Home, Ambulating indepe

## 2019-02-28 NOTE — PROGRESS NOTES
Patient is here for MD jiménez/sam and cycle 3 of Erbitux/Irinotecan. Patient continues on oral Zelboraf 960 mg twice daily. Tolerating well. Patient was approved for Cotellic through the free drug program. Receiving it in the mail today. Mild fatigue.  Appetite ha

## 2019-03-05 NOTE — TELEPHONE ENCOUNTER
Received a call from [place], [place] @ urology office. Patient is there now with c/o diarrhea, hematuria and nausea. Patient has been on IV chemo and just started on oral Cotellic last week. Above symptoms started over the weekend.  Patient to come in tomorrow

## 2019-03-06 NOTE — PROGRESS NOTES
Education Record    Learner:  Patient    Disease / Diagnosis: colorectal cancer    Barriers / Limitations:  None   Comments:    Method:  Brief focused and Discussion   Comments:    General Topics:  Plan of care reviewed   Comments:    Outcome:  Shows under

## 2019-03-06 NOTE — PROGRESS NOTES
ANP Visit Note    Patient Name: Danika Marquez   YOB: 1960   Medical Record Number: [id number]   CSN: [id number]   Date of visit: 3/6/2019       Chief Complaint/Reason for Visit:  Follow up [facility] Colon Cancer    Oncologic History:  7/3 Hydronephrosis     Acute kidney insufficiency     Hydronephrosis, unspecified hydronephrosis type     Ureteral obstruction     Pain due to ureteral stent, initial encounter (HCC)     Obstruction of left ureter     Hydronephrosis, left     Intractable nause STENT REMOVAL EXCHANGE, LEFT RETROGRADE PYLOGRAM   • PART REMOVAL COLON W END COLOSTOMY     • PORT, INDWELLING, IMP Right        Allergies:    Hydromorphone           NAUSEA ONLY, DIZZINESS, FATIGUE  Fish-Derived Produc*    RASH    Family History:  Family Caffeine Concern: No        Exercise: Yes          4 days per week        Seat Belt: Yes        Special Diet: No        Stress Concern: No        Weight Concern: No    Social History Narrative      Not on file      Medications:    Current Outpatient Me 1,000 Units by mouth daily. , Disp: , Rfl:     Review of Systems:  A comprehensive 14 point review of systems was completed. Pertinent positives and negatives noted in the the HPI.     Performance Status: ECOG 1     Physical Examination:  General: Patient mmol/L    Status: Final  CO2                                           Date: 02/28/2019  Value: 25.0        Ref range: 21.0 - 32.0 mmol*  Status: Final  Anion Gap                                     Date: 02/28/2019  Value: 9           Ref range: 0 - 18 mm Final  Globulin                                      Date: 02/28/2019  Value: 4.4         Ref range: 2.8 - 4.4 g/dL     Status: Final  A/G Ratio                                     Date: 02/28/2019  Value: 0.7*        Ref range: 1.0 - 2.0          Status: Date: 02/28/2019  Value: 0.33*       Ref range: 1.00 - 4.00 x10(*  Status: Final  Monocyte Absolute                             Date: 02/28/2019  Value: 0.58        Ref range: 0.10 - 1.00 x10(*  Status: Final  Eosinophil Absolute coping difficulties and social support systems and currently there are no active problems.     Planned Follow Up: 3/14/19 MD labs  or for fluids if needed with call     Risk Level: HIGH Metastatic colon cancer    Electronically Signed by:    Breanna MCCRAY

## 2019-03-07 NOTE — PROGRESS NOTES
ANP Visit Note    Patient Name: Jhoan Grant   YOB: 1960   Medical Record Number: [id number]   CSN: [id number]   Date of visit: 3/7/2019       Chief Complaint/Reason for Visit:  Metastatic Colon Cancer    Oncologic History:  7/31/17 Dipti Ruiz due to antineoplastic chemotherapy     Intractable abdominal pain     SHELL (acute kidney injury) ([facility] [address]     Pyelonephritis     Acute pyelonephritis     Colorectal cancer ([address]     Hypokalemia     Acute cystitis with hematuria     Constipation     Gross hemat Age of Onset   • Other (Other) Father         dementia age 80   • Cancer Mother         colon ca age 80   • Cancer Sister         age 44 Breast CA   • Diabetes Brother    • Cancer Sister         melanoma       Social History:  Social History    Socioeconom cobimetinib (COTELLIC) 20 MG Oral Tab tablet, Take by mouth 3 (three) times daily. , Disp: , Rfl:   •  Cyclobenzaprine HCl 10 MG Oral Tab, Take 1 tablet (10 mg total) by mouth 3 (three) times daily as needed for Muscle spasms. , Disp: 20 tablet, Rfl: 0  •  M oriented x 3, not in acute distress. Vital Signs:    Oncology Vitals 3/8/2019   Height 5' 10\"   Height 178 cm   Weight 151 lb   Weight 68.493 kg   BSA (m2) 1.85 m2   /97   Pulse 80   Resp 16   Temp 96.1   SpO2 100   Pain Score 0     HEENT: EOMs [place] Date: 03/06/2019  Value: 11.3        Ref range: 10.0 - 20.0        Status: Final  Calcium, Total                                Date: 03/06/2019  Value: 8.6         Ref range: 8.5 - 10.1 mg/dL   Status: Final  Calculated Osmolality concentrations >100 ng/mL. It is recommended that physicians ask all patients who may be on biotin supplementation to stop biotin consumption at least 72 hours prior to collection of a new sample.   WBC                                           Date: 03/06/ range: 0.10 - 1.00 x10(*  Status: Final  Eosinophil Absolute                           Date: 03/06/2019  Value: 0.13        Ref range: 0.00 - 0.70 x10(*  Status: Final  Basophil Absolute                             Date: 03/06/2019  Value: 0.02        Ref Risk Level: High: Metastatic Colon Cancer     Electronically Signed by:    Giovana Fountain ANP-BC  Nurse Practitioner  [facility]

## 2019-03-08 NOTE — PROGRESS NOTES
Education Record    Learner:  Patient    Disease / Diagnosis:    Barriers / Limitations:  None   Comments:    Method:  Brief focused   Comments:    General Topics:  Infection, Medication, Pain, Precautions, Procedure, Side effects and symptom management, P

## 2019-03-09 NOTE — BRIEF OP NOTE
Pre-Operative Diagnosis: Hydronephrosis, unspecified hydronephrosis type [N13.30], LEFT URETERAL OBSTRUCTION, DYSURIA     Post-Operative Diagnosis: Hydronephrosis, unspecified hydronephrosis type [N13.30], LEFT URETERAL OBSTRUCTION, DYSURIA     Procedure P

## 2019-03-09 NOTE — H&P
History & Physical Examination    Patient Name: Ashvin Mcnally  MRN: [id number]  [facility]: [id number]  YOB: 1960    Diagnosis: LEFT FLANK PAIN, LEFT HYDRONEPHROSIS    Present Illness: LEFT FLANK PAIN, LEFT HYDRONEPHROSIS      No medications prior age 42 Breast CA   • Diabetes Brother    • Cancer Sister         melanoma     Social History    Tobacco Use      Smoking status: Never Smoker      Smokeless tobacco: Never Used    Alcohol use: No      Alcohol/week: 0.0 oz      Frequency: Never      Comment

## 2019-03-09 NOTE — ANESTHESIA PREPROCEDURE EVALUATION
PRE-OP EVALUATION    Patient Name: Alva Colorado    Pre-op Diagnosis: Hydronephrosis, unspecified hydronephrosis type [N13.30]    Procedure(s):  CYSTOSCOPY, LEFT URETERAL STENT EXCHANGE, DOUBLE STENT PLACED ON LEFT SIDE    Surgeon(s) and Role:     * Smi mouth 2 (two) times daily. Disp: 240 tablet Rfl: 11   Prochlorperazine Maleate (COMPAZINE) 10 mg tablet Take 1 tablet (10 mg total) by mouth every 6 (six) hours as needed for Nausea.  Disp: 30 tablet Rfl: 3   Ondansetron HCl (ZOFRAN) 8 MG tablet Take 1 tabl REMOVAL COLON W END COLOSTOMY     • PORT, INDWELLING, IMP Right      Social History    Tobacco Use      Smoking status: Never Smoker      Smokeless tobacco: Never Used    Alcohol use: No      Alcohol/week: 0.0 oz      Frequency: Never      Comment: Apolinar Ramirez

## 2019-03-14 NOTE — PROGRESS NOTES
Pt here for C3D15.   Arrives Ambulating independently, accompanied by Family member           Modifications in dose or schedule: Yes - chemo held due to dehydration and hypokalemia   Education Record    Learner:  Patient and Family Member  Kelvin / Karon Fothergill

## 2019-03-14 NOTE — PROGRESS NOTES
Patient is here for MD f/u and possible chemo. Patient had a double stent placed in left ureteral on Saturday. Since that time patient has been very weak, no appetite and diarrhea. Diarrhea finally subsided early this morning.  Unable to keep down any fluid

## 2019-03-16 NOTE — OPERATIVE REPORT
[facility]    PATIENT'S NAME: Lola Toñito   ATTENDING PHYSICIAN: John Castillo M.D. OPERATING PHYSICIAN: John Castillo M.D.    PATIENT ACCOUNT#:   [de-identified]    LOCATION:  [address] [place] [address] 10  MEDICAL RECORD #:   [id number]       DATE [address] hydronephrosis but only mild. Therefore, instead of the 2-stent exchange, we elected to proceed with a single stent exchange. A Glidewire was then introduced through the indwelling ureteral stent.   The ureteral stent was then withdrawn, and to confirm fu

## 2019-03-19 NOTE — PROGRESS NOTES
[facility]    PATIENT'S NAME: Harish Angeles   ATTENDING PHYSICIAN: Nasir Stuart M.D.    PATIENT ACCOUNT #: [de-identified] LOCATION: [address] [place] [address] RECORD #: [id number] YOB: 1960   DATE OF SERVICE: 03/14/2019       CANCER ARNIE some hematuria since the stent replacement.     His current medications include AZO cranberry juice daily, vitamin D 1000 units daily, cobimetinib 60 mg which is currently on hold, cyclobenzaprine 10 mg 3 times daily, diazepam 5 mg q.8 h. p.r.n. anxiety, Lo and sodium chloride today. A urine culture is being sent, and we will call him should he have any evidence of urinary tract infection. He is off the cobimetinib. We may readdress this and treat him again as of next week.   We will see how he is doing whe

## 2019-03-21 NOTE — PROGRESS NOTES
Patient is here for MD f/u for rectal cancer. Patient reports he is having diarrhea 4-5 x daily. Taking Imodium as needed. C/o weakness and fatigue. Appetite is poor. + nausea but no vomiting. Patient continues on Zelboraf.  He did not re-start the Cotellic

## 2019-03-21 NOTE — PROGRESS NOTES
Pt here for C3D15.   Arrives Ambulating independently, accompanied by Family member           Modifications in dose or schedule: Yes, Irinotecan dose reduced per Dr. Christo Jaramillo     Frequency of blood return and site check throughout administration: Prior to adm

## 2019-03-26 NOTE — PROGRESS NOTES
[facility]    PATIENT'S NAME: Natalie Howell   ATTENDING PHYSICIAN: Marion Eden M.D.    PATIENT ACCOUNT #: [de-identified] LOCATION: [address] [place] [address] RECORD #: [id number] YOB: 1960   DATE OF SERVICE: 03/21/2019       CANCER ARNIE 97.5 degrees. HEENT:  Unremarkable. He has pink conjunctivae, anicteric sclerae. Pharynx without lesions. LYMPHATICS:  He has no cervical, supraclavicular, or axillary adenopathy. LUNGS:  Resonant to percussion and clear to auscultation.   No wheezing,

## 2019-04-01 NOTE — PROGRESS NOTES
Education Record    Learner:  Patient and Spouse    Disease / Diagnosis: Dehydration / Nausea    Barriers / Limitations:  None   Comments:    Method:  Discussion   Comments:    General Topics:  Medication, Procedure and Plan of care reviewed   Comments:

## 2019-04-08 NOTE — PATIENT INSTRUCTIONS
To reach Dr Isael Cardona nurse during business hours, please call [phone]. After hours, including weekends, evenings, and holidays, please call the main number [phone] for emergent needs.

## 2019-04-08 NOTE — PROGRESS NOTES
Education Record    Learner:  Patient    Disease / Buddie Bowels d/t diarrhea    Barriers / Limitations:  None    Method:  Brief focused, printed material and  reinforcement    General Topics:  Plan of care reviewed    Outcome:  Shows understanding

## 2019-04-08 NOTE — TELEPHONE ENCOUNTER
Sister, [place], called to report patient has been having diarrhea daily for 3 weeks. Called patient to inquire. Patient states he is having 10 loose stools daily x 3 weeks. He has been taking Imodium 8 tabs per day.  He wants to come in today for IV fluids a

## 2019-04-08 NOTE — PROGRESS NOTES
ANP Visit Note    Patient Name: Slim Alexander   YOB: 1960   Medical Record Number: [id number]   CSN: [id number]   Date of visit: 4/8/2019       Chief Complaint/Reason for Visit:  Metastatic Colon Cancer, Intractable Diarrhea    Oncologic Hi Hydronephrosis     Acute kidney insufficiency     Hydronephrosis, unspecified hydronephrosis type     Ureteral obstruction     Pain due to ureteral stent, initial encounter (HCC)     Obstruction of left ureter     Hydronephrosis, left     Intractable nause stent insertion, left   • OTHER SURGICAL HISTORY  11/27/2018    CYSTOSCOPY, LEFT URETERAL STENT REMOVAL EXCHANGE, LEFT RETROGRADE PYLOGRAM   • PART REMOVAL COLON W END COLOSTOMY     • PORT, INDWELLING, IMP Right        Allergies:    Hydromorphone abused: Not on file        Forced sexual activity: Not on file    Other Topics      Concerns:        Caffeine Concern: No        Exercise: Yes          4 days per week        Seat Belt: Yes        Special Diet: No        Stress Concern: No        Weight Co mouth 2 (two) times daily. , Disp: 240 tablet, Rfl: 11  •  Prochlorperazine Maleate (COMPAZINE) 10 mg tablet, Take 1 tablet (10 mg total) by mouth every 6 (six) hours as needed for Nausea., Disp: 30 tablet, Rfl: 3  •  AZO-CRANBERRY OR, Take 1 tablet by mout -American 107 >=60    AST 21 15 - 37 U/L    ALT 14 (L) 16 - 61 U/L    Alkaline Phosphatase 95 45 - 117 U/L    Bilirubin, Total 0.3 0.1 - 2.0 mg/dL    Total Protein 6.1 (L) 6.4 - 8.2 g/dL    Albumin 1.7 (L) 3.4 - 5.0 g/dL    Globulin  4.4 2.8 - 4.4 g and any concerns about anxiety or depression. We discussed issues of distress, coping difficulties and social support systems and currently there are no active problems.     Planned Follow Up: Tomorrow for repeat labs and IVF     Risk Level: High: Metastat

## 2019-04-09 NOTE — PROGRESS NOTES
Pt here for dehydration.   Arrives Ambulating independently, accompanied by Sister          Modifications in dose or schedule: No     Frequency of blood return and site check throughout administration: Prior to administration   Discharged to Home, [place]

## 2019-04-11 NOTE — PATIENT INSTRUCTIONS
For  nurse line, call [phone] with any questions or concerns,  Monday through Friday 8:00 to 4:30.      After hours or weekends for urgent needs: [phone].   Central [phone]  Medical Records:   [address]

## 2019-04-11 NOTE — PROGRESS NOTES
Education Record    Learner:  Patient and spouse    Disease / Diagnosis:colon ca    Barriers / Limitations:  None   Comments:    Method:  Brief focused and Printed material   Comments:    General Topics:  Medication, Side effects and symptom management and

## 2019-04-11 NOTE — PROGRESS NOTES
Patient is here for MD f/u. Patient received IV fluids and potassium on Monday. Patient had been having severe diarrhea for 3 weeks. This has subsided since patient is now on Imodium and Lomotil 2 caps 4 times daily.  Patient does not have much of an appeti

## 2019-04-15 PROBLEM — A04.72 C. DIFFICILE ENTERITIS: Status: ACTIVE | Noted: 2019-01-01

## 2019-04-16 NOTE — PROGRESS NOTES
[facility]    PATIENT'S NAME: Kailey Filomena   ATTENDING PHYSICIAN: Delilah Barr M.D.    PATIENT ACCOUNT #: [de-identified] LOCATION: [address] [place] [address] RECORD #: [id number] YOB: 1960   DATE OF SERVICE: 04/11/2019       CANCER ARNIE 0.125 mcg sublingual p.r.n. cramping, loperamide 2 to 4 mg q.i.d. p.r.n., Uribel 1 capsule t.i.d. p.r.n., ondansetron 8 mg q.8 h. p.r.n., tincture of opium p.r.n., MiraLAX 17 g daily p.r.n. which he is not using, potassium chloride 10 mEq twice daily, proc Marion Eden M.D.  d: 04/15/2019 17:07:19  t: 04/16/2019 02:23:49  [facility] [id number]  /    cc: Maurilio Cage.  Martinez Quintero M.D.

## 2019-04-16 NOTE — TELEPHONE ENCOUNTER
Daughter asking if pt is okay to resume Cotellic and Zelboraf oral treatment. Currently on vancomycin for c.diff. Daughter also states that pt continues to take lomotil and imodium for diarrhea. Since yesterday, pt has also been c/o dysuria.      Daughter i

## 2019-04-16 NOTE — TELEPHONE ENCOUNTER
Called to see if Aleksander Hansen should come in for treatment this week, let her know would like him to come in for labs and assessment and fluids/treatment pending how he is doing.

## 2019-04-16 NOTE — TELEPHONE ENCOUNTER
Spoke to patient - has pyuria. On vanco for C diff. Bowel function improving. Needs to go on levaquin until we find out culture results. TO stay on Vanco throughout any antibiotic course. Will let him know about the culture results when available.   Wi

## 2019-04-17 NOTE — TELEPHONE ENCOUNTER
Sister calling, patient in a lot of pain with urination, to try Uro-Joe and has appointment tomorrow.

## 2019-04-18 NOTE — PROGRESS NOTES
Patient here for APN and possible chemo. Per Abeba Solid APN, no chemo today. Magnesium infusion given and dose of IV vanco. Patient states he's feeling better after infusion.  He reports if he's feeling ok, he won't need to come in on Saturday and Monday for

## 2019-04-18 NOTE — PROGRESS NOTES
ANP Visit Note    Patient Name: Chetan Ashford   YOB: 1960   Medical Record Number: [id number]   CSN: [id number]   Date of visit: 4/18/2019       Chief Complaint/Reason for Visit:  Patient presents with:   Follow - Up  Metastatic Colorectal C Hyponatremia     Hyperglycemia     Hydronephrosis     Acute kidney insufficiency     Hydronephrosis, unspecified hydronephrosis type     Ureteral obstruction     Pain due to ureteral stent, initial encounter (HCC)     Obstruction of left ureter     Hydrone HISTORY  09/01/2018    S/p cystoscopy, retrograde pyelogram, stent insertion, left   • OTHER SURGICAL HISTORY  11/27/2018    CYSTOSCOPY, LEFT URETERAL STENT REMOVAL EXCHANGE, LEFT RETROGRADE PYLOGRAM   • PART REMOVAL COLON W END COLOSTOMY     • PORT, INDWE Emotionally abused: Not on file        Physically abused: Not on file        Forced sexual activity: Not on file    Other Topics      Concerns:        Caffeine Concern: No        Exercise: Yes          4 days per week        Seat Belt: Yes        Speci Apply to body twice daily, Disp: 453.6 g, Rfl: 2  •  HYDROcodone-acetaminophen 5-325 MG Oral Tab, Take 1-2 tablets by mouth every 6 (six) hours as needed for Pain., Disp: 120 tablet, Rfl: 0  •  Hyoscyamine Sulfate (LEVSIN) 0.125 MG Oral Tab, Take 1 tablet Collection Time: 04/16/19  2:07 PM   Result Value Ref Range    Urine Color Yellow Yellow    Clarity Urine Cloudy (A) Clear    Spec Gravity 1.015 1.001 - 1.030    Glucose Urine Negative Negative mg/dl    Bilirubin Urine Negative Negative    Ketones Urine Ne - 5.70 x10(6)uL    HGB 8.6 (L) 13.0 - 17.5 g/dL    HCT 28.4 (L) 39.0 - 53.0 %    .0 150.0 - 450.0 10(3)uL    MCV 87.9 80.0 - 100.0 fL    MCH 26.6 26.0 - 34.0 pg    MCHC 30.3 (L) 31.0 - 37.0 g/dL    RDW 20.7 (H) 11.0 - 15.0 %    RDW-SD 63.7 (H) 35.1

## 2019-04-18 NOTE — PROGRESS NOTES
Patient was here for treatment. Patient state that his diarrhea is better. However patient state that it is very painful when urinating. Patient denies any blood in his urine. Patient just start on Levaquin 2 days ago.

## 2019-04-18 NOTE — PATIENT INSTRUCTIONS
Stop Levaquin  Start Amoxicillin today   Call Dr Remy Berman and arrange for stent exchange [phone]

## 2019-04-18 NOTE — TELEPHONE ENCOUNTER
Left 2 messages on Zane's number, attempted wife without answer and left message with sister.    Additional culture came back with Staph Aureus   Request he return tomorrow at 1 pm for additional Vanc IV and switch to Keflex

## 2019-04-18 NOTE — PATIENT INSTRUCTIONS
To reach Dr Daylin Sanders nurse during business hours, please call [phone]. After hours, including weekends, evenings, and holidays, please call the main number [phone] for emergent needs.

## 2019-04-19 PROBLEM — A49.01 STAPH AUREUS INFECTION: Status: ACTIVE | Noted: 2019-01-01

## 2019-04-19 NOTE — PROGRESS NOTES
Pt here for IVF and IV vanco.  Arrives Ambulating independently, accompanied by Family member           Modifications in dose or schedule: Yes     Frequency of blood return and site check throughout administration: Prior to administration and At completion

## 2019-04-19 NOTE — TELEPHONE ENCOUNTER
Spoke with patient, he did get the message, he had a lot of pain last night. Will come to office today for IV Vancomycin.

## 2019-04-20 NOTE — PROGRESS NOTES
Education Record    Learner:  Patient    Disease / Diagnosis:    Barriers / Limitations:  None   Comments:    Method:  Brief focused   Comments:    General Topics:  Medication   Comments:    Outcome:  Shows understanding   Comments:

## 2019-04-22 NOTE — PROGRESS NOTES
Pt here for hydration.   Arrives Ambulating independently, accompanied by Self and Spouse           Modifications in dose or schedule: No     Frequency of blood return and site check throughout administration: Prior to administration and At completion of th

## 2019-04-22 NOTE — TELEPHONE ENCOUNTER
Medical Clearance request.  Patient is scheduled to have Cystoscopy, left Ureteral stent exchange on 4/30/2019 with Dr. Prasad Cuevas. Outgoing call to patient, left message to call back.

## 2019-04-22 NOTE — PROGRESS NOTES
ANP Visit Note    Patient Name: Danika Marquez   YOB: 1960   Medical Record Number: [id number]   CSN: [id number]   Date of visit: 4/22/2019       Chief Complaint/Reason for Visit:  Metastatic Colon Cancer, Staph UTI    Oncologic History:   7 obstruction     Pain due to ureteral stent, initial encounter ([facility] [address]     Obstruction of left ureter     Hydronephrosis, left     Intractable nausea and vomiting     Anemia due to antineoplastic chemotherapy     Intractable abdominal pain     SHELL (acute kidn 11/27/2018    CYSTOSCOPY, LEFT URETERAL STENT REMOVAL EXCHANGE, LEFT RETROGRADE PYLOGRAM   • PART REMOVAL COLON W END COLOSTOMY     • PORT, INDWELLING, IMP Right        Allergies:    Hydromorphone           NAUSEA ONLY, DIZZINESS, FATIGUE  Fish-Derived Pro on file    Other Topics      Concerns:        Caffeine Concern: No        Exercise: Yes          4 days per week        Seat Belt: Yes        Special Diet: No        Stress Concern: No        Weight Concern: No    Social History Narrative      Not on file topically 2 (two) times daily.  Apply to body twice daily, Disp: 453.6 g, Rfl: 2  •  Hyoscyamine Sulfate (LEVSIN) 0.125 MG Oral Tab, Take 1 tablet (125 mcg total) by mouth every 4 (four) hours as needed for Cramping., Disp: 60 tablet, Rfl: 5  •  vemurafenib infection  2. Staph UTI: will give Vanc in office and instructed wife and patient to  Rx and to start taking daily  3. Dehydration; 1 liter IVF  4. Neoplastic Pain: refill [place]   5. C Dif: continue oral Vanc while on antibiotics for UTI  6.  Hydrone

## 2019-04-25 NOTE — PROGRESS NOTES
Pt here for C4D1.   Arrives Ambulating independently, accompanied by Family member           Modifications in dose or schedule: No     Frequency of blood return and site check throughout administration: Prior to administration   Discharged to Home, [address]

## 2019-04-25 NOTE — PROGRESS NOTES
Patient is here for MD f/u and cycle 4 of chemo. No longer having diarrhea. Patient has been constipated for a few days. On Levaquin daily for 7 days for UTI. Patient is scheduled for left ureteral stent exchange on Tuesday.  [facility]

## 2019-04-25 NOTE — TELEPHONE ENCOUNTER
Called to pt to schedule H&P. Pt stated that he is getting clearance from his oncologist Dr Mamta Mcmanus. Called to the urology surgery scheduler Chika Parks to ask if this is acceptable, had to lm for Chika Parks to cb.

## 2019-04-30 NOTE — ANESTHESIA PREPROCEDURE EVALUATION
PRE-OP EVALUATION    Patient Name: Claudetta Ingles    Pre-op Diagnosis: Hydronephrosis, unspecified hydronephrosis type [N13.30]    Procedure(s):  CYSTOSCOPY, LEFT URETERAL STENT EXCHANGE    Surgeon(s) and Role:     Aracely Swift MD - Primary    Pre-o Rfl: 0   fentaNYL 25 MCG/HR Transdermal Patch 72 Hr Place 1 patch onto the skin every third day. Disp: 10 patch Rfl: 0   [DISCONTINUED] opium 10 MG/ML (1%) Oral Tincture Take 0.6 mL (6 mg total) by mouth every 4 (four) hours as needed (diarrhea).  Disp: 118 reversal done 5/25/18   • OTHER SURGICAL HISTORY      left shoulder surgery football injury in HS   • OTHER SURGICAL HISTORY  09/01/2018    S/p cystoscopy, retrograde pyelogram, stent insertion, left   • OTHER SURGICAL HISTORY  11/27/2018    CYSTOSCOPY, LE

## 2019-04-30 NOTE — INTERVAL H&P NOTE
Pre-op Diagnosis: Hydronephrosis, unspecified hydronephrosis type [N13.30]    The above referenced H&P was reviewed by Umu Albrecht MD on 4/30/2019, the patient was examined and no significant changes have occurred in the patient's condition since the H&

## 2019-04-30 NOTE — PROGRESS NOTES
[facility]    PATIENT'S NAME: Josh Amezcua   ATTENDING PHYSICIAN: Elfego Darnell M.D.    PATIENT ACCOUNT #: [de-identified] LOCATION: [address] [place] [address] RECORD #: [id number] YOB: 1960   DATE OF SERVICE: 04/25/2019       CANCER ARNIE is a thin male in no acute distress. He is fatigued-appearing, but certainly looks better than he did. VITAL SIGNS:  His performance status is 1. His weight is 137 pounds. Blood pressure is 132/81, pulse 100, respiratory rate 20, temperature 98. 2.    HE

## 2019-04-30 NOTE — H&P
History & Physical Examination    Patient Name: 1921 [address]  MRN: [id number]  CSN: [id number]  YOB: 1960    Diagnosis: LEFT URETERAL OBSTRUCTION    Present Illness: LEFT URETERAL OBSTRUCTION      No medications prior to admission.     No c Age of Onset   • Other (Other) Father         dementia age 80   • Cancer Mother         colon ca age 80   • Cancer Sister         age 44 Breast CA   • Diabetes Brother    • Cancer Sister         melanoma     Social History    Tobacco Use      Smoking statu

## 2019-04-30 NOTE — BRIEF OP NOTE
Pre-Operative Diagnosis: Hydronephrosis, unspecified hydronephrosis type [N13.30], left ureteral obstruction     Post-Operative Diagnosis:  Hydronephrosis, unspecified hydronephrosis type [N13.30], left ureteral obstruction      Procedure Performed:   Proc

## 2019-05-01 NOTE — OPERATIVE REPORT
[facility]    PATIENT'S NAME: Merline Ana   ATTENDING PHYSICIAN: Sidney Silvestre M.D. OPERATING PHYSICIAN: Sidney Silvestre M.D.    PATIENT ACCOUNT#:   [de-identified]    [facility]  MEDICAL RECORD #:   [id number]       DATE Zoltan Riley entirety. The cystoscope was then placed once again through the urethra into the bladder and a 5-Turkish open-ended catheter was placed into the ureter. A retrograde pyelogram was performed noting the areas of narrowing and impingement.   A 0.038 Glidewire

## 2019-05-02 NOTE — PROGRESS NOTES
ANP Visit Note    Patient Name: Kika Abbott   YOB: 1960   Medical Record Number: [id number]   CSN: [id number]   Date of visit: 5/2/2019       Chief Complaint/Reason for Visit:  Metastatic Cancer Cancer    Oncologic History:           7/31 stent, initial encounter ([facility] [address]     Obstruction of left ureter     Hydronephrosis, left     Intractable nausea and vomiting     Anemia due to antineoplastic chemotherapy     Intractable abdominal pain     SHELL (acute kidney injury) ([address]     Pyelonephritis HISTORY  09/01/2018    S/p cystoscopy, retrograde pyelogram, stent insertion, left   • OTHER SURGICAL HISTORY  11/27/2018    CYSTOSCOPY, LEFT URETERAL STENT REMOVAL EXCHANGE, LEFT RETROGRADE PYLOGRAM   • PART REMOVAL COLON W END COLOSTOMY     • PORT, INDWE Emotionally abused: Not on file        Physically abused: Not on file        Forced sexual activity: Not on file    Other Topics      Concerns:        Caffeine Concern: No        Exercise: Yes          4 days per week        Seat Belt: Yes        Speci tablet (125 mcg total) by mouth every 4 (four) hours as needed for Cramping., Disp: 60 tablet, Rfl: 5  •  Prochlorperazine Maleate (COMPAZINE) 10 mg tablet, Take 1 tablet (10 mg total) by mouth every 6 (six) hours as needed for Nausea., Disp: 30 tablet, Rf 32.0 mmol/L    Anion Gap 7 0 - 18 mmol/L    BUN 10 7 - 18 mg/dL    Creatinine 0.78 0.70 - 1.30 mg/dL    BUN/CREA Ratio 12.8 10.0 - 20.0    Calcium, Total 8.3 (L) 8.5 - 10.1 mg/dL    Calculated Osmolality 283 275 - 295 mOsm/kg    GFR, Non-African American 9 take Imodium and if ineffective Lomotil, To take the Vanco any time on any antibiotics for any reason.   4. Hydronephrosis: stents exchanged, plan is exchange in 6 weeks with Dr Faviola Bautista  5. Neoplastic Pain: reinforced to not stop the Fentanyl without weaning,

## 2019-05-02 NOTE — PROGRESS NOTES
Education Record    Learner:  Patient and Spouse    Disease / Diagnosis: rectal cancer metastasized to liver    Barriers / Limitations:  None   Comments:    Method:  Brief focused   Comments:    General Topics:  Plan of care reviewed   Comments:    Outcome

## 2019-05-09 NOTE — PROGRESS NOTES
Patient is here for MD f/u for colorectal cancer. Patient developed a red rash on both arms over the weekend. Patient believes this to be a heat rash. Applying lotion as needed. Eating well. Energy level has improved in the past few weeks.  Still having loo

## 2019-05-09 NOTE — PROGRESS NOTES
Pt here for C4D15.   Arrives Ambulating independently, accompanied by dtr         Modifications in dose or schedule: No     Frequency of blood return and site check throughout administration: Prior to administration   Discharged to Home, Ambulating independ

## 2019-05-09 NOTE — PATIENT INSTRUCTIONS
To reach Dr Claudette Cooper nurse during business hours, please call [phone]. After hours, including weekends, evenings, and holidays, please call the main number [phone] for emergent needs.

## 2019-05-21 NOTE — PROGRESS NOTES
[facility]    PATIENT'S NAME: Merline Jazmyn   ATTENDING PHYSICIAN: Ofelia Ahn M.D.    PATIENT ACCOUNT #: [de-identified] LOCATION: [address] [place] [address] RECORD #: [id number] YOB: 1960   DATE OF SERVICE: 05/09/2019       CANCER ARNIE vancomycin 125 mg 4 times a day when he is on other antibiotics. PHYSICAL EXAMINATION:    GENERAL:  He is a well-developed, well-nourished male. He is in no acute distress.     VITAL SIGNS:  His performance status is 1, his weight is 139 pounds, which

## 2019-05-30 NOTE — PROGRESS NOTES
Pt here for 3 week MD f/u and due for chemo. Energy level has been pretty good. Appetite is good. Denies pain. Pt notes rash on face and hands. Pt got sunburnt from 10 minutes in sun. No bowel issues. Pt has no further complaints.      Outpatient Oncology C

## 2019-06-01 NOTE — PROGRESS NOTES
[facility]    PATIENT'S NAME: Helen Khan   ATTENDING PHYSICIAN: Anna Grady M.D.    PATIENT ACCOUNT #: [de-identified] LOCATION: [address] [place] [address] RECORD #: [id number] YOB: 1960   DATE OF SERVICE: 05/30/2019       CANCER ARNIE b.i.d.; ondansetron 8 mg q.8 h. p.r.n.; MiraLAX p.r.n.; potassium chloride 10 mEq twice daily; prochlorperazine 10 mg q.6 h. p.r.n.; triamcinolone topical cream p.r.n. PHYSICAL EXAMINATION:    GENERAL:  He is a well-developed, well-appearing male.   He l prior history of C difficile. He will be back to see us in 3 weeks for his next treatment. He is also tolerating the BRAF and MEK inhibitors, and needs to stay on those in the meantime as well.      Dictated By Marion Eden M.D.  d: 05/31/2019 07:00:

## 2019-06-06 PROBLEM — E87.6 HYPOKALEMIA: Status: ACTIVE | Noted: 2019-01-01

## 2019-06-06 NOTE — PROGRESS NOTES
ANP Visit Note    Patient Name: Miguel A Amaro   YOB: 1960   Medical Record Number: [id number]   CSN: [id number]   Date of visit: 6/6/2019       Chief Complaint/Reason for Visit:  Metastatic Rectal Cancer     Oncologic NNYLGAK:           7/3 Acute kidney insufficiency     Hydronephrosis, unspecified hydronephrosis type     Ureteral obstruction     Pain due to ureteral stent, initial encounter ([facility] [address]     Obstruction of left ureter     Hydronephrosis, left     Intractable nausea and vomiting • OTHER      colostomy reversal done 5/25/18   • OTHER SURGICAL HISTORY      left shoulder surgery football injury in HS   • OTHER SURGICAL HISTORY  09/01/2018    S/p cystoscopy, retrograde pyelogram, stent insertion, left   • OTHER SURGICAL HISTORY  11/ organizations: Not on file        Relationship status: Not on file      Intimate partner violence:        Fear of current or ex partner: Not on file        Emotionally abused: Not on file        Physically abused: Not on file        Forced sexual activity: (LEVSIN) 0.125 MG Oral Tab, Take 1 tablet (125 mcg total) by mouth every 4 (four) hours as needed for Cramping., Disp: 60 tablet, Rfl: 5  •  Prochlorperazine Maleate (COMPAZINE) 10 mg tablet, Take 1 tablet (10 mg total) by mouth every 6 (six) hours as need Creatinine 1.54 (H) 0.70 - 1.30 mg/dL    BUN/CREA Ratio 13.0 10.0 - 20.0    Calcium, Total 9.8 8.5 - 10.1 mg/dL    Calculated Osmolality 288 275 - 295 mOsm/kg    GFR, Non- 48 (L) >=60    GFR, -American 55 (L) >=60    AST 39 (H) 15 - 18:21.  Shabbir Grossman ,  KIDNEY/BLADDER (QNK=[id number]), 1/07/2019, 11:56.      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 We discussed issues of distress, coping difficulties and social support systems and currently there are no active problems.     Planned Follow Up: tomorrow for labs, NP and possible fluids and electrolyte replacement     Risk Level: High: [place]

## 2019-06-07 NOTE — PROGRESS NOTES
ANP Visit Note    Patient Name: Dioni Connor   YOB: 1960   Medical Record Number: [id number]   CSN: [id number]   Date of visit: 6/7/2019       Chief Complaint/Reason for Visit:  Metastatic Rectal Cancer, Dehydration    Oncologic History: stent, initial encounter ([facility] [address]     Obstruction of left ureter     Hydronephrosis, left     Intractable nausea and vomiting     Anemia due to antineoplastic chemotherapy     Intractable abdominal pain     SHELL (acute kidney injury) ([address]     Pyelonephritis OTHER SURGICAL HISTORY  09/01/2018    S/p cystoscopy, retrograde pyelogram, stent insertion, left   • OTHER SURGICAL HISTORY  11/27/2018    CYSTOSCOPY, LEFT URETERAL STENT REMOVAL EXCHANGE, LEFT RETROGRADE PYLOGRAM   • PART REMOVAL COLON W END COLOSTOMY Not on file        Emotionally abused: Not on file        Physically abused: Not on file        Forced sexual activity: Not on file    Other Topics      Concerns:        Caffeine Concern: No        Exercise: Yes          4 days per week        Seat Belt: Y Prochlorperazine Maleate (COMPAZINE) 10 mg tablet, Take 1 tablet (10 mg total) by mouth every 6 (six) hours as needed for Nausea., Disp: 30 tablet, Rfl: 3  •  AZO-CRANBERRY OR, Take 1 tablet by mouth daily.   , Disp: , Rfl:   •  Loperamide HCl (IMODIUM A-D) GFR, -American 55 (L) >=60    AST 39 (H) 15 - 37 U/L    ALT 82 (H) 16 - 61 U/L    Alkaline Phosphatase 579 (H) 45 - 117 U/L    Bilirubin, Total 0.4 0.1 - 2.0 mg/dL    Total Protein 8.5 (H) 6.4 - 8.2 g/dL    Albumin 3.4 3.4 - 5.0 g/dL    Globulin  5. 21.0 - 32.0 mmol/L    Anion Gap 9 0 - 18 mmol/L    BUN 25 (H) 7 - 18 mg/dL    Creatinine 1.74 (H) 0.70 - 1.30 mg/dL    BUN/CREA Ratio 14.4 10.0 - 20.0    Calcium, Total 9.8 8.5 - 10.1 mg/dL    Calculated Osmolality 296 (H) 275 - 295 mOsm/kg    GFR, Non-Afr

## 2019-06-07 NOTE — PATIENT INSTRUCTIONS
Continue to hold Zelboraf and Cotellic until seen on Monday. [facility] Sat and Sun for fluids with potasssium  Lorazepam 0.5 mg SL q 6 PRN nausea.

## 2019-06-07 NOTE — PATIENT INSTRUCTIONS
Fluids on Saturday and Sunday in [place]  Return on Monday to [place] for labs, visit with Olivier Hoff and fluids      Dr. Aminata Lin nurse line Monday through Friday 8-4:30:  [phone]  After hours or weekends for emergent needs:  [phone].      Sapna Delgado

## 2019-06-07 NOTE — PROGRESS NOTES
Pt here for labs and IVF.   Arrives Via wheelchair, accompanied by Family member           Frequency of blood return and site check throughout administration: Prior to administration and At completion of therapy   Discharged to Home, Via wheelchair, tosin

## 2019-06-08 NOTE — TELEPHONE ENCOUNTER
Left VM for patient that he is 20 min late to his appt and his fluids are over 2 hours. Asked that he call back.

## 2019-06-08 NOTE — PROGRESS NOTES
Education Record    Learner:  Patient    Disease / Diagnosis:rectal ca    Barriers / Limitations:  None   Comments:    Method:  Brief focused   Comments:    General Topics:  Plan of care reviewed   Comments:    Outcome:  Shows understanding   Comments:pt h

## 2019-06-09 PROBLEM — E86.0 DEHYDRATION: Status: ACTIVE | Noted: 2019-01-01

## 2019-06-09 NOTE — H&P
[facility] HOSPITAL[facility]                                                               History & Physical         Eri Nichols Hollytreelinda Kimball Patient Status:  Inpatient    1960 MRN [id number]   [facility] 4NW-A Attending Renu Walker MD   [address] Day # disorder     swelling of left kidney   • Syncope    • Visual impairment     glasses       Past Surgical History:   Procedure Laterality Date   • COLONOSCOPY     • COLONOSCOPY, POSSIBLE BIOPSY, POSSIBLE POLYPECTOMY 47221 N/A 7/31/2017    Performed by Maria M Kee 0    Minocycline HCl 50 MG Oral Cap Take 1 capsule (50 mg total) by mouth 2 (two) times daily. Disp: 60 capsule Rfl: 5    diphenoxylate-atropine 2.5-0.025 MG Oral Tab Take 1-2 tablets by mouth 4 (four) times daily as needed for Diarrhea.  Disp: 90 tablet Rf mouth daily. Disp:  Rfl:  Taking       Review of Systems:  A comprehensive 14 point review of systems was completed. Pertinent positives and negatives noted in the the HPI.     Physical Exam:     Vital signs: Blood pressure (!) 148/100, pulse 90, tempera pneumothorax. No lobar consolidation.     =====  CONCLUSION:  No active cardiopulmonary process identified.            Dictated by: Amelia Redmond MD on 6/09/2019 at 11:06       Approved by: Amelia Redmond MD            XR ABDOMEN (1 VIEW) (CPT=74018)

## 2019-06-09 NOTE — PROGRESS NOTES
06/09/19 1433   Clinical Encounter Type   Visited With Patient   Routine Visit Introduction   Continue Visiting No   Patient's Supportive Strategies/Resources   ( honored patient by acknowledging his licha and family as being important resources

## 2019-06-09 NOTE — PLAN OF CARE
Patient received via stretcher from ER, sent to ER from [facility] due to nausea and vomiting, not controlled. Arrived on unit, large vomit, antimetics administered, monitoring for effectiveness.  Abdomen soft, bowel sounds present, passing flatus, lungs

## 2019-06-09 NOTE — PROGRESS NOTES
Patient came in via wheelchair with son. Patient wanted to go straight to the ER. He stated he hasn't been able to keep anything down and has been vomiting. Dr. Cristi Lopez notified. No treatment given at the [facility] today.

## 2019-06-09 NOTE — ED NOTES
Report called patient vomited small amount of green bile which he said that he has been doing that  The oral potassium will be discontinued I did inform the admitting nurse and she said that they will probably order more potassium via IV

## 2019-06-09 NOTE — ED INITIAL ASSESSMENT (HPI)
Patient has stage 4 colon cancer here because of dehydration he was at the cancer center today and feels to weak

## 2019-06-09 NOTE — ED NOTES
Patient presented with a port-a-cath which was accessed yesterday they kept the line active because he was scheduled to go to the center today Patient c/o lock jaw this has never happened to him  He has continuous hiccups

## 2019-06-09 NOTE — ED PROVIDER NOTES
Patient Seen in: [facility] Emergency Department    History   Patient presents with:  Dyspnea SYLVIA SOB (respiratory)  Dehydration (metabolic/constitutional)    Stated Complaint: SYLVIA, dehydration, CA pt    HPI    68-year-old male with history of stage FLEXIBLE SIGMOIDOSCOPY N/A 8/11/2017    Performed by Cassie Shaw MD at [facility] ENDOSCOPY   • FAVIOLA [address]. EXT/INT Left 11/2018   • OTHER      colostomy reversal done 5/25/18   • OTHER SURGICAL HISTORY      left shoulder surgery football injury i Result Value    Glucose 163 (*)     Sodium 135 (*)     Potassium 2.7 (*)     Chloride 90 (*)     CO2 36.0 (*)     BUN 29 (*)     Creatinine 2.17 (*)     Calcium, Total 10.4 (*)     GFR, Non- 32 (*)     GFR, -American 37 (*)     AST injection 4 mg (4 mg Intravenous Given 6/9/19 1114)     Xr Abdomen (1 View) (cpt=74018)    Result Date: 6/9/2019  PROCEDURE:  XR ABDOMEN (1 VIEW) (CPT=74018)  INDICATIONS:  SYLVIA, dehydration, CA pt  COMPARISON:  None.   TECHNIQUE:  Supine AP view was obtaine file for this visit. Follow-up:  No follow-up provider specified.       Medications Prescribed:  Current Discharge Medication List

## 2019-06-10 PROBLEM — R11.2 INTRACTABLE NAUSEA AND VOMITING: Status: ACTIVE | Noted: 2019-01-01

## 2019-06-10 PROBLEM — K56.609 SBO (SMALL BOWEL OBSTRUCTION) (HCC): Status: ACTIVE | Noted: 2019-01-01

## 2019-06-10 NOTE — CONSULTS
[facility]  Report of Consultation    Early Nikolas Patient Status:  Inpatient    1960 MRN [id number]   [facility] 4NW-A Attending Sue Crane MD   Hosp Day # 1 PCP [address] [place] [address],      Reason for Consultation:  Met re MAIN OR   • CYSTOSCOPY STENT INSERTION Left 9/1/2018    [facility]apleton [facility] [address]   • CYSTOSCOPY URETEROSCOPY Left 4/30/2019    Performed by Lucio Stapleton MD at [facility]   • CYSTOSCOPY URETEROSCOPY Left 3/9/2019    Performed by Jame Boyd Intravenous, Q6H PRN  •  Metoclopramide HCl (REGLAN) injection 10 mg, 10 mg, Intravenous, Q8H PRN  •  Prochlorperazine Edisylate (COMPAZINE) injection 10 mg, 10 mg, Intravenous, Q6H PRN  •  Heparin Lock Flush 100 UNIT/ML lock flush 500 Units, 5 mL, Intrave Glucose 163 (H) 70 - 99 mg/dL    Sodium 135 (L) 136 - 145 mmol/L    Potassium 2.7 (LL) 3.5 - 5.1 mmol/L    Chloride 90 (L) 98 - 112 mmol/L    CO2 36.0 (H) 21.0 - 32.0 mmol/L    Anion Gap 9 0 - 18 mmol/L    BUN 29 (H) 7 - 18 mg/dL    Creatinine 2.17 (H) 0.7 Result Value Ref Range    Urine Color Yellow Yellow    Clarity Urine Cloudy (A) Clear    Spec Gravity 1.021 1.001 - 1.030    Glucose Urine 50  (A) Negative mg/dl    Bilirubin Urine Negative Negative    Ketones Urine Trace (A) Negative mg/dL    Blood Urin uL    Neutrophil Absolute 6.91 1.50 - 7.70 x10(3) uL    Lymphocyte Absolute 0.58 (L) 1.00 - 4.00 x10(3) uL    Monocyte Absolute 0.77 0.10 - 1.00 x10(3) uL    Eosinophil Absolute 0.00 0.00 - 0.70 x10(3) uL    Basophil Absolute 0.00 0.00 - 0.20 x10(3) uL hold for now. 2)  Intractable N+V. Had acute LFT elevation which is in fact declining. Plan plain CT of abd and pelvis. Ice chips and antiemetics. 3)  Pyuria - culture pending. Afebrile. Has stent in place. May need change.   BC from 6/6 was NG

## 2019-06-10 NOTE — PROGRESS NOTES
[facility]  Progress Note     [facility] Patient Status:  Inpatient    1960 MRN [id number]   [facility] 4NW-A Attending Mark Walker MD   Hosp Day # 1 PCP SUSIE MISTRY DO     CC:  Intractable nausea, vomiting    SUBJ (IZM=64786)     COMPARISON:  [facility] , CT CHEST+ABDOMEN+PELVIS(ALL CNTRST ONLY)(CPT=71260/57286), 4/09/2019, 18:21.      INDICATIONS:  intractable N and V     TECHNIQUE:  Unenhanced multislice CT scanning was performed from the dome of the diaphragm to the p presacral mass to the left of midline adjacent to the rectum measuring 55 x 33 mm were previously measured (41 x 30 mm) consistent with worsening metastatic disease. BONES:  No bony lesion or fracture.     LUNG BASES:  No visible pulmonary or pleural disea with a transition point in the left mid abdomen within the proximal jejunum secondary to a metastatic serosal implant with massive distention of the stomach. 1. IV Zofran PRN. 2.   IV Compazine as needed. 3. IV Reglan as needed.     4. NPO  5. NG tube

## 2019-06-10 NOTE — H&P (VIEW-ONLY)
[facility]  Report of Consultation    Claudetta Ingles Patient Status:  Inpatient    1960 MRN [id number]   [facility] 4NW-A Attending Lynn Guzmán MD   Hosp Day # 1  [facility],      Reason for Consultation:  [place] intra-abdominally. The patient's past medical history consists of the above-mentioned rectal cancer with metastases, C. difficile, and prior basal cell cancer.   His past surgical history includes colonoscopy, laparoscopic low anterior resection of recto colostomy reversal done 5/25/18   • OTHER SURGICAL HISTORY      left shoulder surgery football injury in HS   • OTHER SURGICAL HISTORY  09/01/2018    S/p cystoscopy, retrograde pyelogram, stent insertion, left   • OTHER SURGICAL HISTORY  11/27/2018    CYST allergies completed   Cardiovascular:  Negative for cool extremity and irregular heartbeat/palpitations  Constitutional: Positive for fatigue and lethargy  Endocrine:  Negative for abnormal sleep patterns, increased activity, polydipsia and polyphagia  ENM clubbing or cyanosis. Skin: Normal texture and turgor. Laboratory Data:  Recent Labs   Lab 06/06/19  1015 06/09/19  1017 06/10/19  0528   RBC 4.13* 4.58 4.19*   HGB 11.2* 12.7* 11.6*   HCT 36.3* 40.5 37.5*   MCV 87.9 88.4 89.5   MCH 27.1 27.7 27. BILIARY:  No visible dilatation or calcification. PANCREAS:  No lesion, fluid collection, ductal dilatation, or atrophy. SPLEEN:  No enlargement or focal lesion. KIDNEYS:  Nonobstructing 2 mm kidney stone right lower pole.   Left kidney demonstra 10: 02       Approved by: Lynn Whitley MD           Impression:  Patient Active Problem List:     Other malignant neoplasm of skin, site unspecified     Unspecified asthma(493.90)     Rectal cancer metastasized to liver [facility])     Anxiety     Weakness gener implant. Plan:  1. Recommend NG tube placement for decompression. The patient has massive dilatation of his stomach on imaging  2.  I briefly discussed with the patient and his wife that the CT scan does show a bowel obstruction with the transition poin

## 2019-06-10 NOTE — CONSULTS
[facility]  Report of Consultation    Anthony Clifton Patient Status:  Inpatient    1960 MRN [id number]   [facility] 4NW-A Attending Mary Jo Sheridan MD   Hosp Day # 1  [facility],      Reason for Consultation:  Jorge A Mosqueda intra-abdominally. The patient's past medical history consists of the above-mentioned rectal cancer with metastases, C. difficile, and prior basal cell cancer.   His past surgical history includes colonoscopy, laparoscopic low anterior resection of recto colostomy reversal done 5/25/18   • OTHER SURGICAL HISTORY      left shoulder surgery football injury in HS   • OTHER SURGICAL HISTORY  09/01/2018    S/p cystoscopy, retrograde pyelogram, stent insertion, left   • OTHER SURGICAL HISTORY  11/27/2018    CYST allergies completed   Cardiovascular:  Negative for cool extremity and irregular heartbeat/palpitations  Constitutional: Positive for fatigue and lethargy  Endocrine:  Negative for abnormal sleep patterns, increased activity, polydipsia and polyphagia  ENM clubbing or cyanosis. Skin: Normal texture and turgor. Laboratory Data:  Recent Labs   Lab 06/06/19  1015 06/09/19  1017 06/10/19  0528   RBC 4.13* 4.58 4.19*   HGB 11.2* 12.7* 11.6*   HCT 36.3* 40.5 37.5*   MCV 87.9 88.4 89.5   MCH 27.1 27.7 27. BILIARY:  No visible dilatation or calcification. PANCREAS:  No lesion, fluid collection, ductal dilatation, or atrophy. SPLEEN:  No enlargement or focal lesion. KIDNEYS:  Nonobstructing 2 mm kidney stone right lower pole.   Left kidney demonstra 10: 02       Approved by: Nuvia Park MD           Impression:  Patient Active Problem List:     Other malignant neoplasm of skin, site unspecified     Unspecified asthma(493.90)     Rectal cancer metastasized to liver [facility])     Anxiety     Weakness gener implant. Plan:  1. Recommend NG tube placement for decompression. The patient has massive dilatation of his stomach on imaging  2.  I briefly discussed with the patient and his wife that the CT scan does show a bowel obstruction with the transition poin

## 2019-06-10 NOTE — PHYSICAL THERAPY NOTE
Attempted x2 this date. First attempt, pt just received ativan. Second attempt, pt had just had NG tube placed.   D/w RN, will f/u later during admit for eval.

## 2019-06-10 NOTE — PAYOR COMM NOTE
--------------  ADMISSION REVIEW     Payor: [address] #:  [id number]  Authorization Number: [id number]    Admit date: 6/9/19  Admit time: 0898       Patient Seen in: [facility] Emergency Department    History   [address] Performed by Will Cuenca MD at [facility] MAIN OR   • [address] N/A 8/11/2017    Performed by Cassie Shaw MD at [facility] ENDOSCOPY   • FAVIOLA Bond. EXT/INT Left 11/2018   • OTHER      colostomy reversal done 5/25/18   • OTHER SURGICAL HI other components within normal limits   MAGNESIUM - Normal   URINALYSIS WITH CULTURE REFLEX     EKG : No ST elevation or depression    MDM     Medications   sodium chloride 0.9% IV bolus 1,000 mL (1,000 mL Intravenous New Bag 6/9/19 1051)   potassium frequency. Denies any headache. Denies any chest pain or shortness of breath. Patient states she has been taking oral nausea medication for the weekend without any improvement and went to the cancer center yesterday for IV fluids through his port.   Madeline ALT 61 06/09/2019    MG 2.4 06/09/2019     ASSESSMENT / PLAN:     1. Acute kidney injury due to intractable nausea vomiting  1. IV fluids. 2. Follow BMP  2. Intractable nausea and vomiting due to effects of chemo  1. IV Zofran PRN.   2.   IV Compazine as emergency department for further evaluation and treatment. His CT scan of the abdomen and pelvis was obtained earlier today.   This demonstrates significant dilatation of the stomach with a high-grade small bowel obstruction with a transition point within disease.       69-year-old male with a history of rectal cancer with metastatic disease. Underwent laparoscopic low anterior resection of rectosigmoid colon approximately 18 months ago.   He underwent reversal of his diverting ileostomy approximately 12 mo Abdomen) John Mandujano RN      dextrose 5 % and 0.45 % NaCl with KCl 20 mEq infusion     Date Action Dose Route User    6/10/2019 1339 New Bag (none) Intravenous John Mandujano RN      LORazepam (ATIVAN) injection 0.5 mg     Date Action Dose Route User

## 2019-06-10 NOTE — PROGRESS NOTES
Pt A&ox4 but very drowsy today; ativan given for nausea/pain control  CT abd/pelv showed SBO; NG started buy Dr. Hannah connors with LIS= large output with much relief from patient  Pt up with contact guar  Void per urinal  No BM's  NPO- ice chips okay  R

## 2019-06-10 NOTE — PLAN OF CARE
Problem: GASTROINTESTINAL - ADULT  Goal: Minimal or absence of nausea and vomiting  Description  INTERVENTIONS:  - Maintain adequate hydration with IV or PO as ordered and tolerated  - Nasogastric tube to low intermittent suction as ordered  - Evaluate e appropriate  - Instruct patient on fluid and nutrition restrictions as appropriate  Outcome: Progressing     Problem: MUSCULOSKELETAL - ADULT  Goal: Return mobility to safest level of function  Description  INTERVENTIONS:  - Assess patient stability and ac

## 2019-06-10 NOTE — PROGRESS NOTES
06/10/19 145   Clinical Encounter Type   Visited With Patient   Patient's Supportive Strategies/Resources Father Anthony Mayfield provided prayer, Scripture, support and Sacrament of the Sick.     Sacramental Encounters   Sacrament of Sick-Anointing Anointed

## 2019-06-10 NOTE — DIETARY MALNUTRITION NOTE
[facility]    NUTRITION INITIAL ASSESSMENT    Pt meets malnutrition criteria.     CRITERIA FOR MALNUTRITION DIAGNOSIS:  Criteria for non-severe malnutrition diagnosis: chronic illness related to energy intake less than75% for greater than 1 month, body exam.    NUTRITION PRESCRIPTION:  Calories: 1960 calories/day (35 calories per kg)  Protein: 67 grams protein/day (1.2 grams protein per kg)  Fluid: ~1 ml/kcal or per MD discretion    MONITOR AND EVALUATE/NUTRITION GOALS:    Monitor for diet advancement

## 2019-06-11 PROBLEM — K56.609 SBO (SMALL BOWEL OBSTRUCTION) (HCC): Status: ACTIVE | Noted: 2019-01-01

## 2019-06-11 NOTE — PROGRESS NOTES
Heme/Onc Progress Note    Patient Name: Dorota Portillo   YOB: 1960   Medical Record Number: [id number]   CSN: [id number]   Attending Physician: Negar Atkinson M.D. Subjective:   Had CT which showed marked gastric distention and distend PRN    Physical Examination:  General: Patient is alert and oriented x 3, not in acute distress.   Vital Signs: /85   Pulse 91   Temp 98.7 °F (37.1 °C) (Oral)   Resp 18   Ht 1.778 m (5' 10\")   Wt 56.2 kg (123 lb 14.4 oz)   SpO2 99%   BMI 17.78 kg/m² is transmitted to the [facility] ([address] [place] [address] of Radiology) [address]) which includes the Dose Index Registry. PATIENT STATED HISTORY: (As transcribed by Technologist)  Patient has lower right and left abdomen pain.  Patient high grade bowel obstruction with a transition point in the left mid abdomen within the proximal jejunum secondary to a metastatic serosal implant which is also immediately adjacent to the left ureter. There is massive distention of   the stomach.      2.

## 2019-06-11 NOTE — PROGRESS NOTES
[facility]  Progress Note    [address] [facility] Patient Status:  Inpatient    1960 MRN [id number]   [facility] 4NW-A Attending Kassidy Camacho MD   Hosp Day # 2 PCP SUSIE MISTRY DO     CC:  Intractable nausea, vomiting    SUBJ ABDOMEN+PELVIS (CPT=74176)     COMPARISON:  JAYJAY , CT CHEST+ABDOMEN+PELVIS(ALL CNTRST ONLY)(CPT=71260/18997), 4/09/2019, 18:21.      INDICATIONS:  intractable N and V     TECHNIQUE:  Unenhanced multislice CT scanning was performed from the dome of the chula There is a presacral mass to the left of midline adjacent to the rectum measuring 55 x 33 mm were previously measured (41 x 30 mm) consistent with worsening metastatic disease. BONES:  No bony lesion or fracture.     LUNG BASES:  No visible pulmonary or pl intractable nausea vomiting due to SBO  1. IV fluids. 2. Follow BMP  2.  Intractable nausea and vomiting due to high grade bowel obstruction with a transition point in the left mid abdomen within the proximal jejunum secondary to a metastatic serosal imp

## 2019-06-11 NOTE — PHYSICAL THERAPY NOTE
PHYSICAL THERAPY EVALUATION - INPATIENT     Room Number: 003/143-F  Evaluation Date: 6/11/2019  Type of Evaluation: Initial  Physician Order: PT Eval and Treat    Presenting Problem: Dehydration, SBO, hypokalemia, SHELL  Reason for Therapy: Mobility Dy Date   • COLONOSCOPY     • COLONOSCOPY, POSSIBLE BIOPSY, POSSIBLE POLYPECTOMY 35232 N/A 7/31/2017    Performed by Hardy Flores MD at [address] [facility] 11/27/2018    Performed by Noah Godoy MD at [address] [place] [address] WFL - within functional limits  · Arousal/Alertness:  appropriate responses to stimuli  · Orientation Level:  oriented x4  · Following Commands:  follows all commands and directions without difficulty  · Safety Judgement:  good awareness of safety precauti neena using [address] [place] [address]. Pt then able to amb 200' c RW and supervision, demo's the above deviations c slow deliberate pattern. Pt then returned to his room and sat in [facility] c supervision. Pt rested for several min and then requested to return to bed.  Sit/stand c super training;Balance training;Stoop training;Stair training  Rehab Potential : Good  Frequency (Obs): 3x/week  Number of Visits to Meet Established Goals:  Other (Comment)(2-3 visits)      CURRENT GOALS    Goal #1 Patient is able to demonstrate supine - sit EOB

## 2019-06-11 NOTE — PLAN OF CARE
Pt A&Ox4; RA; NG to LIS  NPO w/ ice chips  Pt feeling better today- no complaints of nausea or pain  Ambulating in hallway with walker  More sad today regarding prognosis and surgery  Family supportive at bedside  Will continue to monitor

## 2019-06-11 NOTE — CM/SW NOTE
06/11/19 1500   CM/SW Referral Data   Referral Source    Reason for Referral Discharge planning   Informant Patient;Spouse   Pertinent Medical Hx   Primary Care Physician Name Dr Corby Molina   Date of Last Contact with PCP 5/2019   Patient Info   A

## 2019-06-11 NOTE — PROGRESS NOTES
[facility]  Progress Note     [facility],7Gws Patient Status:  Inpatient    1960 MRN [id number]   [facility] 4NW-A Attending Jailyn Marrero MD   Hosp Day # 2 PCP SUSIE MISTRY,      Subjective:  Pt resting in bed, reports g left ureter     Hydronephrosis, left     Intractable nausea and vomiting     Anemia due to antineoplastic chemotherapy     Intractable abdominal pain     SHELL (acute kidney injury) ([facility]     Pyelonephritis     Acute pyelonephritis     Colorectal cancer ([facility] the bedside, reports that he feels hungry.     General: Alert, orientated x3. Cooperative. No apparent distress. Abdomen: Soft, non-distended, no tenderness, with no rebound or guarding.  No peritoneal signs.          A/P small bowel obstruction due to met

## 2019-06-12 NOTE — PROGRESS NOTES
[facility]  Progress Note     [facility],7Gws Patient Status:  Inpatient    1960 MRN [id number]   [facility] 4NW-A Attending Lynn Guzmán MD   Hosp Day # 3 PCP SUSIE MISTRY DO     CC:  Intractable nausea, vomiting    SUBJ 1.35 06/12/2019    PTP 17.4 06/12/2019    MG 2.5 06/12/2019    PHOS 3.0 06/12/2019       Imaging:  CT ABDOMEN+PELVIS (CPT=74176)     COMPARISON:  JAYJAY , CT CHEST+ABDOMEN+PELVIS(ALL CNTRST ONLY)(CPT=71260/07626), 4/09/2019, 18:21.      INDICATIONS:  intrac visible focal wall thickening, lesion, or calculus. PELVIC NODES:  No adenopathy.     PELVIC ORGANS:  There is a presacral mass to the left of midline adjacent to the rectum measuring 55 x 33 mm were previously measured (41 x 30 mm) consistent with worse (TYLENOL) tab 650 mg 650 mg Oral Q6H PRN   ondansetron HCl (ZOFRAN) injection 4 mg 4 mg Intravenous Q6H PRN   Metoclopramide HCl (REGLAN) injection 10 mg 10 mg Intravenous Q8H PRN   Prochlorperazine Edisylate (COMPAZINE) injection 10 mg 10 mg Intravenous Q were discussed with patient and RN by bedside.             Kalee Guajardo MD  6/12/2019

## 2019-06-12 NOTE — PLAN OF CARE
Pt up in chair. NG to LIS. Clamped when up to bathroom or ambulating. Ambulated in chaudhary with PT. Tolerated well. Ice chips. Denies complaints.

## 2019-06-12 NOTE — PROGRESS NOTES
[facility]  Progress Note    [address] [facility],7Gws Patient Status:  Inpatient    1960 MRN [id number]   [facility] 4NW-A Attending Rishi Galvan MD   Hosp Day # 3 PCP SUSIE MISTRY, DO     Subjective:  Pt without new complaints.

## 2019-06-12 NOTE — DIETARY NOTE
Nutrition Short Note - TPN   Consult for TPN. Pt w/ SBO related to metastatic nodule. Pt NPO x4 days. Pt w/ NGT for decompression. 800 cc output over last shift. Plan to initiate bag #1 tonight.  TPN today providing 799 NPC (479 dextrose calories, 300 lipid

## 2019-06-12 NOTE — ANESTHESIA PREPROCEDURE EVALUATION
PRE-OP EVALUATION    Patient Name: Rocio Abebe    Pre-op Diagnosis: SBO [facility] obstruction[facility] [W73.211]    Procedure(s):  LAPAROSCOPIC POSSIBLE OPEN LYSIS OF ADHESIONS, POSSIBLE SMAL BOWEL RESECTION    Surgeon(s) and Role:     Garcia Bedoya 4 mg 4 mg Intravenous Once   Heparin Sodium (Porcine) 5000 UNIT/ML injection 5,000 Units 5,000 Units Subcutaneous 2 times per day   acetaminophen (TYLENOL) tab 650 mg 650 mg Oral Q6H PRN   ondansetron HCl (ZOFRAN) injection 4 mg 4 mg Intravenous Q6H PRN Performed by Will Cuenca MD at [facility] MAIN OR   • [address] N/A 8/11/2017    Performed by Cassie Shaw MD at [facility] ENDOSCOPY   • FAVIOLA Bond. EXT/INT Left 11/2018   • OTHER      colostomy reversal done 5/25/18   • OTHER SURGICAL HI Will give morphine prn for pain. Possibly use low dose ketamine if open case.    Plan/risks discussed with: patient                Present on Admission:  • Dehydration  • Hypokalemia  • SHELL (acute kidney injury) ([facility]  • Colorectal cancer ([facility]  • Hyp

## 2019-06-12 NOTE — PHYSICAL THERAPY NOTE
PHYSICAL THERAPY TREATMENT NOTE - INPATIENT    Room Number: 336/072-T     Session: 1   Number of Visits to Meet Established Goals:  Other (Comment)(2-3 visits)    Presenting Problem: Dehydration, SBO, hypokalemia, HSELL    History related to current admissio Chula Aldana MD at [facility] ENDOSCOPY   • [address] EXT/INT Left 11/2018   • OTHER      colostomy reversal done 5/25/18   • OTHER SURGICAL HISTORY      left shoulder surgery football injury in HS   • OTHER SURGICAL HISTORY  09/01/2018    S/p cy STATUS  Gait Assessment   Gait Assistance: Supervision  Distance (ft): 400  Assistive Device: Rolling walker  Pattern: Shuffle(decreased hailey and step length)  Stoop/Curb Assistance: Not tested       Skilled Therapy Provided: RN approved session.  Pt pre Comments: Goals established on 6/11/2019  Progressing

## 2019-06-12 NOTE — PLAN OF CARE
A/O. RA. Flat affect. K 3.1. Replaced per [facility] protocol. K recheck drawn at [phone] via PAC. Excellent blood return. Denies pain. Some nausea with mag citrate via NG.  Giving mag citrate slow per pt request. Has been able to tolerate it being given a syringe of needed  - Monitor I&O, WT and lab values  - Obtain nutritional consult as needed  - Optimize oral hygiene and moisture  - Encourage food from home; allow for food preferences  - Enhance eating environment  Outcome: Not Progressing     Problem: METABOLIC/FL

## 2019-06-13 NOTE — OPERATIVE REPORT
DATE OF OPERATION:  6/13/2019  PREOPERATIVE DIAGNOSIS: Small bowel obstruction due to metastatic implant. POSTOPERATIVE DIAGNOSIS: Small bowel obstruction due to metastatic implant.    PROCEDURE PERFORMED: Laparoscopic lysis of adhesions and small bowel r 5 mm port were placed. The small bowel was run. The area of small bowel obstruction was identified. Carefully, the small bowel was dissected free from the tumor using a combination of blunt dissection as well as scissors with cautery.   After the small b withdrawn under direct vision. The pneumoperitoneum was aspirated, and the remaining ports withdrawn. The skin all the incisions was reapproximated with a subcuticular 5-0 Vicryl suture.   Steri-Strips and Mastisol placed across the incisions, as well as

## 2019-06-13 NOTE — ANESTHESIA POSTPROCEDURE EVALUATION
[address] Patient Status:  Inpatient   Age/Gender 62year old male MRN [id number]   Location [address] [facility] Attending Manuelito Miramontes MD   Hosp Day # 4 PCP SUSEI MISTRY, DO       Anesthesia Post-op N

## 2019-06-13 NOTE — INTERVAL H&P NOTE
Pre-op Diagnosis: SBO (small bowel obstruction) ([facility] [K56.609]    The above referenced H&P was reviewed by Maki Chen MD on 6/13/2019, the patient was examined and no significant changes have occurred in the patient's condition since the H&P was

## 2019-06-13 NOTE — PLAN OF CARE
Problem: GASTROINTESTINAL - ADULT  Goal: Minimal or absence of nausea and vomiting  Description  INTERVENTIONS:  - Maintain adequate hydration with IV or PO as ordered and tolerated  - Nasogastric tube to low intermittent suction as ordered  - Evaluate e instructed patient  re strict NPO,  consent signed, NGT re -connected  to LIS, was clamped  for 4 hrs per MD instructions, no bowel movement yet, hypoactive BS. Denies abd. Tenderness  and nausea,  started on TPN tonight, PAC with good blood return.  PRN a

## 2019-06-13 NOTE — DIETARY NOTE
Nutrition Short Note - TPN   Pt w/ SBO related to metastatic nodule, s/p  Small bowel resection POD#0. Pt w/ NGT for decompression. No documented output over last shift. Plan to initiate bag #2 tonight.  TPN today providing 1168 NPC (718 dextrose calories,

## 2019-06-13 NOTE — PLAN OF CARE
Pt s/p resection received pt from pacu alert x3 drowsy c/o 9/10 pain. Pt given toradol at 1351 now rates pain 8/10 4mg of morphine given at this time. Pt in bed sullivan placed in surgery vitals stable IS at bedside 5 lap drsg dry and intact.  Will continue to

## 2019-06-13 NOTE — PROGRESS NOTES
Came to see patient who was in BR - prepping for laparoscopic assessment and potential repair of SBO - seems to be high grade partial - given the fact that he had some output from below with mag citrate. TPN started.   To OR at 7:30  Nelda Johnson MD

## 2019-06-13 NOTE — PROGRESS NOTES
[facility]  Progress Note     [facility],7Gws Patient Status:  Inpatient    1960 MRN [id number]   [facility] 4NW-A Attending Trevor Pérez MD   Hosp Day # 4 PCP SUSIE MISTRY DO     CC:  Intractable nausea, vomiting    SUBJ 3.5 06/13/2019     06/13/2019    CO2 34.0 06/13/2019     06/13/2019    CA 8.6 06/13/2019    ALB 2.7 06/13/2019    ALKPHO 213 06/13/2019    BILT 0.3 06/13/2019    TP 6.1 06/13/2019    AST 36 06/13/2019    ALT 41 06/13/2019    MG 2.4 06/13/2019 where there is a mass   lesion measuring 27 x 35 mm consistent with a metastatic lesion which is larger than the previous study where measured (14 x 12 mm). ABDOMINAL WALL:  No mass or hernia.     URINARY BLADDER:  No visible focal wall thickening, lesion, tromethamine (TORADOL) 30 MG/ML injection 30 mg 30 mg Intravenous Q6H PRN   morphINE sulfate (PF) 4 MG/ML injection 2 mg 2 mg Intravenous Q2H PRN   Or      morphINE sulfate (PF) 4 MG/ML injection 4 mg 4 mg Intravenous Q2H PRN   Or      morphINE sulfate (PF presacral mass;Slight worsening of metastatic liver disease  1. Oncology following  6. Malnutrition  1.  Dietitian following        Quality:  · DVT Prophylaxis: SCD, subcutaneous heparin  · CODE status: full  · Wall: no      Estimated date of discharge: TB

## 2019-06-13 NOTE — PROGRESS NOTES
NPO for surgery. Began moving bowels after mag citrate earlier. tpn infusing. Ng to lis draining dark brown drainage. Voidig. Denies pain.

## 2019-06-14 NOTE — PLAN OF CARE
Report called to scu rn pt will transfer to 325 when bed is clean. 1900 pt transferred belongings taken with pt. Pt placed on LIS suction call light within reach.  Rn notified that pt was on unit

## 2019-06-14 NOTE — DIETARY MALNUTRITION NOTE
[facility]     NUTRITION INITIAL ASSESSMENT     Pt meets malnutrition criteria.     CRITERIA FOR MALNUTRITION DIAGNOSIS:  Criteria for non-severe malnutrition diagnosis: chronic illness related to energy intake less than75% for greater than 1 month, b Pt w/ moderate malnutrition based on nutrition focused physical exam. Will continue to monitor for plan of care.     ANTHROPOMETRICS:  Ht: 177.8 cm (5' 10\")  Wt: 56.2 kg (123 lb 14.4 oz). This is 74% of IBW  BMI: Body mass index is 17.78 kg/m².   IBW: 75.4

## 2019-06-14 NOTE — HOME CARE LIAISON
Received referral from [address] Met with pt at the bedside. Pt is agreeable to [facility] services at d/c. Brochure and liaison card provided. Any pt questions addressed. Will follow. Will need home health orders and F2F prior to d/c.

## 2019-06-14 NOTE — PHYSICAL THERAPY NOTE
PHYSICAL THERAPY RE-EVALUATION - INPATIENT     Room Number: 325/325-A  Evaluation Date: 6/14/2019  Type of Evaluation: Re-evaluation  Physician Order: PT Eval and Treat    Presenting Problem: s/p lap lysis of adhesions and SB resection 6/13/19  Mam[place] Performed by Bety Dickinson MD at [address] [place] [address] N/A 8/11/2017    Performed by Rosa Maria Claire MD at [facility] ENDOSCOPY   • FAVIOLA Bond. EXT/INT Left 11/2018   • LAPAROSCOPIC COLON RESECTION - RIGHT N/A 6/13/2019    Performed MOTION AND STRENGTH ASSESSMENT  Upper extremity ROM and strength are-see OT eval    Lower extremity ROM is within functional limits    Lower extremity strength is : BLE grossly 4+/5    BALANCE  Static Sitting: Good  Dynamic Sitting: Good  Static Standing: training  Posture  Transfer training    Patient End of Session: Up in chair;Needs met;RN aware of session/findings;Call light within reach; All patient questions and concerns addressed; Family present    ASSESSMENT   Patient is a 62year old male admitted on level: independent     Goal #3 Patient is able to ambulate 600 feet with assist device: walker - rolling at assistance level: modified independent     Goal #4 Pt will negotiate 1 flight of stairs c mod indep by MILKA   Goal #5    Goal #6    Goal Comments: [facility]

## 2019-06-14 NOTE — PROGRESS NOTES
Wall d/c'd at this time per md order. Pt tolerated well. Voiding expectations explained and pt shows understanding. Vss. Pt a&ox3. Pt on ra with 02 sats wnl. Lungs cta. Pt denies difficulty breathing or sob.  Pt rested in chair this am and bathed in bathro

## 2019-06-14 NOTE — PROGRESS NOTES
Patient has small red rash to top part or left lower leg and bottom part of right lower leg. Pt reports neither are itchy. 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 Lotion applied to legs.

## 2019-06-14 NOTE — PROGRESS NOTES
Heme/Onc Progress Note    Patient Name: Jhoan Grant   YOB: 1960   Medical Record Number: [id number]   CSN: [id number]   Attending Physician: Marion Eden M.D.      Subjective:  POD#1 lap jamey with lysis of adhesions and partial small LORazepam (ATIVAN) injection 0.5 mg, 0.5 mg, Intravenous, Q6H PRN  •  phenol (CHLORASEPTIC) 1.4 % oral liquid spray 1-5 spray, 1-5 spray, Oral, Q2H PRN  •  Heparin Lock Flush 100 UNIT/ML lock flush 500 Units, 5 mL, Intravenous, PRN    Physical Examination: Total Protein 5.8 (L) 6.4 - 8.2 g/dL    Albumin 2.4 (L) 3.4 - 5.0 g/dL    Globulin  3.4 2.8 - 4.4 g/dL    A/G Ratio 0.7 (L) 1.0 - 2.0   MAGNESIUM    Collection Time: 06/14/19  5:40 AM   Result Value Ref Range    Magnesium 2.3 1.6 - 2.6 mg/dL   PHOSPHORUS

## 2019-06-14 NOTE — PROGRESS NOTES
[facility]  Progress Note    [address] [facility],7Gws Patient Status:  Inpatient    1960 MRN [id number]   [facility] 3NW-A Attending Rishi Galvan MD   Hosp Day # 5 PCP SUSIE MISTRY, DO     Subjective:  Feeling ok. No flatus.  Abdom perirectal soft tissue     Intractable migraine without aura     Hyponatremia     Hyperglycemia     Hydronephrosis     Acute kidney insufficiency     Hydronephrosis, unspecified hydronephrosis type     Ureteral obstruction     Pain due to ureteral stent, i

## 2019-06-14 NOTE — OCCUPATIONAL THERAPY NOTE
OCCUPATIONAL THERAPY QUICK EVALUATION - INPATIENT    Room Number: 325/325-A  Evaluation Date: 6/14/2019     Type of Evaluation: Initial  Presenting Problem: SBO    Physician Order: IP Consult to Occupational Therapy  Reason for Therapy:  ADL/IADL Dysfuncti Performed by Glenny Taveras MD at [address] [place] [address] N/A 8/11/2017    Performed by Maciel Reyna MD at [facility] ENDOSCOPY   • FAVIOLA Bond. EXT/INT Left 11/2018   • LAPAROSCOPIC COLON RESECTION - RIGHT N/A 6/13/2019    Performed off regular lower body clothing?: A Little   -   Bathing (including washing, rinsing, drying)?: A Little  -   Toileting, which includes using toilet, bedpan or urinal? : A Little  -   Putting on and taking off regular upper body clothing?: A Little  -   Ta modifications of tasks    Clinical Decision Making  LOW - Analysis of occupational profile, problem-focused assessments, limited treatment options    Overall Complexity  LOW     OT Discharge Recommendations: Home       PLAN   Patient has been evaluated and

## 2019-06-14 NOTE — PROGRESS NOTES
[facility]  Progress Note     [facility],7Gws Patient Status:  Inpatient    1960 MRN [id number]   [facility] 4NW-A Attending Cinthya Tiwari MD   Hosp Day # 5 PCP SUSIE MISTRY DO     CC:  Intractable nausea, vomiting    SUBJ 06/14/2019     06/14/2019    CO2 30.0 06/14/2019     06/14/2019    CA 8.6 06/14/2019    ALB 2.4 06/14/2019    ALKPHO 172 06/14/2019    BILT 0.3 06/14/2019    TP 5.8 06/14/2019    AST 32 06/14/2019    ALT 34 06/14/2019    MG 2.3 06/14/2019    P there is a mass   lesion measuring 27 x 35 mm consistent with a metastatic lesion which is larger than the previous study where measured (14 x 12 mm). ABDOMINAL WALL:  No mass or hernia.     URINARY BLADDER:  No visible focal wall thickening, lesion, or ca Intravenous Q6H PRN   Or      ketorolac tromethamine (TORADOL) 30 MG/ML injection 30 mg 30 mg Intravenous Q6H PRN   morphINE sulfate (PF) 4 MG/ML injection 2 mg 2 mg Intravenous Q2H PRN   Or      morphINE sulfate (PF) 4 MG/ML injection 4 mg 4 mg Intravenou worsening of metastatic liver disease  1. Oncology following  7. Malnutrition  1.  Dietitian following        Quality:  · DVT Prophylaxis: SCD, subcutaneous heparin  · CODE status: full  · Wall: no      Estimated date of discharge: TBD  Discharge is depend

## 2019-06-14 NOTE — PLAN OF CARE
Problem: GASTROINTESTINAL - ADULT  Goal: Minimal or absence of nausea and vomiting  Description  INTERVENTIONS:  - Maintain adequate hydration with IV or PO as ordered and tolerated  - Nasogastric tube to low intermittent suction as ordered  - Evaluate e appropriate  - Instruct patient on fluid and nutrition restrictions as appropriate  Outcome: Progressing     Problem: MUSCULOSKELETAL - ADULT  Goal: Return mobility to safest level of function  Description  INTERVENTIONS:  - Assess patient stability and ac stability  - Promote increasing activity/tolerance for mobility and gait  - Educate and engage patient/family in tolerated activity level and precautions  - Recommend use of  RW for transfers and ambulation   Outcome: Progressing

## 2019-06-15 NOTE — PROGRESS NOTES
[facility]  Progress Note     [facility],7Gws Patient Status:  Inpatient    1960 MRN [id number]   [facility] 3NW-A Attending Keyshawn Freire MD   Hosp Day # 6 PCP SUSIE MISTRY, DO     Subjective:  Pt with more cramping and ab

## 2019-06-15 NOTE — PROGRESS NOTES
[facility]  Progress Note     [facility],7Gws Patient Status:  Inpatient    1960 MRN [id number]   [facility] 4NW-A Attending Stanley Fleischer, MD   Hosp Day # 6 PCP SUSIE MISTRY DO     CC:  Intractable nausea, vomiting    SUBJ 31.0 06/15/2019    GLU 97 06/15/2019    CA 8.6 06/15/2019    ALB 2.4 06/15/2019    ALKPHO 169 06/15/2019    BILT 0.2 06/15/2019    TP 5.9 06/15/2019    AST 35 06/15/2019    ALT 35 06/15/2019    MG 2.1 06/15/2019    PHOS 3.2 06/15/2019    PGLU 117 06/15/201 35 mm consistent with a metastatic lesion which is larger than the previous study where measured (14 x 12 mm). ABDOMINAL WALL:  No mass or hernia. URINARY BLADDER:  No visible focal wall thickening, lesion, or calculus. PELVIC NODES:  No adenopathy. mg 10 mg Intravenous Q6H PRN   Enoxaparin Sodium (LOVENOX) 40 MG/0.4ML injection 40 mg 40 mg Subcutaneous QPM   morphINE sulfate (PF) 4 MG/ML injection 2 mg 2 mg Intravenous Q2H PRN   Or      morphINE sulfate (PF) 4 MG/ML injection 4 mg 4 mg Intravenous Q2 following  7. Malnutrition  1. Dietitian following  2.  Patient on TPN  8. GERD–on IV Protonix        Quality:  · DVT Prophylaxis: SCD, subcutaneous heparin  · CODE status: full  · Wall: no      Estimated date of discharge: TBD  Discharge is dependent on:

## 2019-06-15 NOTE — DIETARY NOTE
Nutrition Short Note - TPN   Pt continues with NPO status. Pt w/ NGT for decompression. 2400 cc output over last 24hours. Plan to continue with at goal TPN,  bag #4 tonight.  TPN today providing 1560 NPC (960 dextrose calories, 600 lipid calories), 31% lip

## 2019-06-15 NOTE — PLAN OF CARE
Problem: GASTROINTESTINAL - ADULT  Goal: Minimal or absence of nausea and vomiting  Description  INTERVENTIONS:  - Maintain adequate hydration with IV or PO as ordered and tolerated  - Nasogastric tube to low intermittent suction as ordered  - Evaluate e appropriate  - Instruct patient on fluid and nutrition restrictions as appropriate  Outcome: Progressing     Problem: MUSCULOSKELETAL - ADULT  Goal: Return mobility to safest level of function  Description  INTERVENTIONS:  - Assess patient stability and ac stability  - Promote increasing activity/tolerance for mobility and gait  - Educate and engage patient/family in tolerated activity level and precautions  - Recommend use of  RW for transfers and ambulation   Outcome: Progressing     Problem: Impaired Acti

## 2019-06-15 NOTE — PROGRESS NOTES
Vss. Pt a&ox3. Pt ra with 02 sats wnl. Lungs cta. Pt denies difficulty breathing or sob. Pt denies n/v. Tolerating ice chips well. Remains npo with ice chips. ngt in place to left nare and draining dark brown drainage.  Drainage from ngt was more green in c

## 2019-06-15 NOTE — PROGRESS NOTES
Heme/Onc Progress Note    Patient Name: Dioni Connor   YOB: 1960   Medical Record Number: [id number]   CSN: [id number]         Subjective:  POD#2 lap jamey with lysis of adhesions and partial small bowel resection with anastomosis.   Had a Intravenous, Q2H PRN **OR** morphINE sulfate (PF) 4 MG/ML injection 8 mg, 8 mg, Intravenous, Q2H PRN  •  dextrose 10 % infusion, , Intravenous, Continuous PRN  •  LORazepam (ATIVAN) injection 0.5 mg, 0.5 mg, Intravenous, Q6H PRN  •  phenol (CHLORASEPTIC) 1 Calcium, Total 8.6 8.5 - 10.1 mg/dL    Calculated Osmolality 303 (H) 275 - 295 mOsm/kg    GFR, Non- 97 >=60    GFR, -American 112 >=60    AST 35 15 - 37 U/L    ALT 35 16 - 61 U/L    Alkaline Phosphatase 169 (H) 45 - 117 U/L    Biliru

## 2019-06-16 NOTE — PROGRESS NOTES
[facility]  Progress Note     [facility],7Gws Patient Status:  Inpatient    1960 MRN [id number]   [facility] 3NW-A Attending Keyshawn Freire MD   Hosp Day # 7 PCP SUSIE MISTRY, DO     Subjective:  Pt's NGT fell out.   He has p

## 2019-06-16 NOTE — PROGRESS NOTES
Heme/Onc Progress Note    Patient Name: Kelly Harrington   YOB: 1960   Medical Record Number: [id number]   CSN: [id number]         Subjective:  POD#3 lap jamey with lysis of adhesions and partial small bowel resection with anastomosis.   No BM Intravenous, Q6H PRN  •  phenol (CHLORASEPTIC) 1.4 % oral liquid spray 1-5 spray, 1-5 spray, Oral, Q2H PRN  •  Heparin Lock Flush 100 UNIT/ML lock flush 500 Units, 5 mL, Intravenous, PRN    Physical Examination:  General: Patient is alert and oriented x 3, (L) 6.4 - 8.2 g/dL    Albumin 2.3 (L) 3.4 - 5.0 g/dL    Globulin  3.3 2.8 - 4.4 g/dL    A/G Ratio 0.7 (L) 1.0 - 2.0   MAGNESIUM    Collection Time: 06/16/19  5:58 AM   Result Value Ref Range    Magnesium 2.1 1.6 - 2.6 mg/dL   PHOSPHORUS    Collection Time:

## 2019-06-16 NOTE — PLAN OF CARE
Ngt became dislodged this am during transfer. Dr Brianne Veras here to see pt & states to leave ngt out. Cld initiated for lunch. Andreiies n&v  Spoke with erica vences re: k = 3.5 and pt has orders for electrolyte protocol.   Pt has one iv access, using pac fo

## 2019-06-16 NOTE — DIETARY NOTE
Nutrition Short Note - TPN     Pt started on clear liquid diet, NG fell out    Plan to continue with at goal TPN until diet advance to regular and tolerance established,  bag #5 tonight.    TPN tonight providing 1560 NPC (960 dextrose calories, 600 lipid ca

## 2019-06-16 NOTE — PROGRESS NOTES
[facility]  Progress Note     [facility] Patient Status:  Inpatient    1960 MRN [id number]   [facility] 4NW-A Attending Crow Mcduffie MD   Hosp Day # 7 PCP SUSIE MISTRY DO     CC:  Intractable nausea, vomiting    SUBJ 06/16/2019    ALB 2.3 06/16/2019    ALKPHO 169 06/16/2019    BILT 0.2 06/16/2019    TP 5.6 06/16/2019    AST 27 06/16/2019    ALT 32 06/16/2019    MG 2.1 06/16/2019    PHOS 3.1 06/16/2019    PGLU 99 06/16/2019       Imaging:  CT ABDOMEN+PELVIS (CPT=74176) is larger than the previous study where measured (14 x 12 mm). ABDOMINAL WALL:  No mass or hernia. URINARY BLADDER:  No visible focal wall thickening, lesion, or calculus. PELVIC NODES:  No adenopathy.     PELVIC ORGANS:  There is a presacral mass to Subcutaneous QPM   morphINE sulfate (PF) 4 MG/ML injection 2 mg 2 mg Intravenous Q2H PRN   Or      morphINE sulfate (PF) 4 MG/ML injection 4 mg 4 mg Intravenous Q2H PRN   Or      morphINE sulfate (PF) 4 MG/ML injection 8 mg 8 mg Intravenous Q2H PRN   dextr following  2. Patient on TPN  8. GERD–on IV Protonix        Quality:  · DVT Prophylaxis: SCD, subcutaneous heparin  · CODE status: full  · Wall: no      Estimated date of discharge: TBD  Discharge is dependent on: clinical progress  At this point Mr. Azul

## 2019-06-17 NOTE — PROGRESS NOTES
[facility]  Progress Note    Early Buzzard Patient Status:  Inpatient    1960 MRN [id number]   [facility] 3NW-A Attending Russ Tovar MD   Hosp Day # 8 PCP SUSIE MISTRY, DO     Subjective:  Pt without nausea or vomiti

## 2019-06-17 NOTE — DIETARY NOTE
Nutrition Short Note - TPN      Pt tolerating full liquid diet. Denies any abdominal pain, nausea, vomiting, diarrhea, or constipation. + BM yesterday. RD added chocolate magic cup supplement to meals to maximize nutrition intake via PO.    Plan to wean TPN

## 2019-06-17 NOTE — PHYSICAL THERAPY NOTE
PHYSICAL THERAPY TREATMENT NOTE - INPATIENT    Room Number: 325/325-A     Session: 1   Number of Visits to Meet Established Goals:  Other (Comment)(1-2 visits)    Presenting Problem: s/p lap lysis of adhesions and SB resection 6/13/19      History related Performed by Evelyn Delaney MD at [facility] ENDOSCOPY   • FAVI[address] EXT/INT Left 11/2018   • LAPAROSCOPIC COLON RESECTION - RIGHT N/A 6/13/2019    Performed by Vasyl Shaw MD at [facility] MAIN OR   • OTHER      colostomy reversal done 5/25/18 steps with a railing?: A Little       AM-PAC Score:  Raw Score: 19   Approx Degree of Impairment: 41.77%   Standardized Score (AM-PAC Scale): 45.44   CMS Modifier (G-Code): CK    FUNCTIONAL ABILITY STATUS  Gait Assessment   Gait Assistance: Supervision  Ziggy Lemus training;Strengthening;Stoop training;Stair training;Balance training;Transfer training  Rehab Potential : Good  Frequency (Obs): 3x/week    CURRENT GOALS   Goal #1 Patient is able to demonstrate supine - sit EOB @ level: modified independent      Goal #2

## 2019-06-17 NOTE — PROGRESS NOTES
Heme/Onc Progress Note    Patient Name: Ashvin Mcnally   YOB: 1960   Medical Record Number: [id number]   CSN: [id number]   Attending Physician: Jassi Gonzalez M.D. Subjective:  Feels well. NG out - no N or V. Had BM.   Still on TPN - (CHLORASEPTIC) 1.4 % oral liquid spray 1-5 spray, 1-5 spray, Oral, Q2H PRN  •  Heparin Lock Flush 100 UNIT/ML lock flush 500 Units, 5 mL, Intravenous, PRN    Physical Examination:  General: Patient is alert and oriented x 3, not in acute distress.   Vital S 0.1 - 2.0 mg/dL    Total Protein 5.8 (L) 6.4 - 8.2 g/dL    Albumin 2.3 (L) 3.4 - 5.0 g/dL    Globulin  3.5 2.8 - 4.4 g/dL    A/G Ratio 0.7 (L) 1.0 - 2.0   CBC W/ DIFFERENTIAL    Collection Time: 06/17/19  6:00 AM   Result Value Ref Range    WBC 4.7 4.0 - 1 jamey with lysis of adhesions and partial small bowel resection with anastomosis. Better - NG out and feels well enough to advance diet - up to surgery.      3)  Pyuria - culture negative.  Has stent in place.  plan future stent exchange.       4)  Elevated

## 2019-06-17 NOTE — PROGRESS NOTES
[facility]  Progress Note     [facility],7[facility]ws Patient Status:  Inpatient    1960 MRN [id number]   [facility] 4NW-A Attending Singh Ambrose MD   Hosp Day # 8 PCP SUSIE MISTRY DO     CC:  Intractable nausea, vomiting    SUBJ 06/17/2019    AST 32 06/17/2019    ALT 35 06/17/2019    MG 2.2 06/17/2019    PHOS 3.0 06/17/2019    PGLU 113 06/17/2019       Imaging:  CT ABDOMEN+PELVIS (CPT=74176)     COMPARISON:  [facility] , CT CHEST+ABDOMEN+PELVIS(ALL CNTRST ONLY)(CPT=71260/96502), 4/09/ hernia. URINARY BLADDER:  No visible focal wall thickening, lesion, or calculus. PELVIC NODES:  No adenopathy.     PELVIC ORGANS:  There is a presacral mass to the left of midline adjacent to the rectum measuring 55 x 33 mm were previously measured (4 injection 4 mg 4 mg Intravenous Q2H PRN   Or      morphINE sulfate (PF) 4 MG/ML injection 8 mg 8 mg Intravenous Q2H PRN   dextrose 10 % infusion  Intravenous Continuous PRN   LORazepam (ATIVAN) injection 0.5 mg 0.5 mg Intravenous Q6H PRN   phenol (CHLORASE

## 2019-06-17 NOTE — PLAN OF CARE
Assumed care of pt at 1700. Alert & oriented, walking in halls with family. TPN infusing in right PAC, site clear, dressing intact. Pt informed of need to change needle & dressing tonight, wants to wait until after dinner. Denies pain.

## 2019-06-18 NOTE — PLAN OF CARE
Problem: GASTROINTESTINAL - ADULT  Goal: Minimal or absence of nausea and vomiting  Description  INTERVENTIONS:  - Maintain adequate hydration with IV or PO as ordered and tolerated  - Nasogastric tube to low intermittent suction as ordered  - Evaluate e wounds/incisions during mobilization  - Obtain PT/OT consults as needed  - Advance activity as appropriate  - Communicate ordered activity level and limitations with patient/family  Outcome: Progressing  Goal: Maintain proper alignment of affected body par Promote sitting position while performing ADLs such as feeding, grooming, and bathing  - Educate and encourage patient/family in tolerated functional activity level and precautions during self-care     Outcome: Progressing     Problem: Patient/Family Goals

## 2019-06-18 NOTE — PROGRESS NOTES
[facility]  Progress Note     [facility],7Gws Patient Status:  Inpatient    1960 MRN [id number]   [facility] 4NW-A Attending Eladio Mi MD   Hosp Day # 9 PCP SUSIE MISTRY DO     CC:  Intractable nausea, vomiting    SUBJ 06/18/2019    BILT 0.3 06/18/2019    TP 6.5 06/18/2019    AST 34 06/18/2019    ALT 41 06/18/2019    MG 2.2 06/18/2019    PHOS 3.3 06/18/2019    PGLU 86 06/18/2019       Imaging:  CT ABDOMEN+PELVIS (CPT=74176)     COMPARISON:  [facility] , CT CHEST+ABDOMEN+PELV (14 x 12 mm). ABDOMINAL WALL:  No mass or hernia. URINARY BLADDER:  No visible focal wall thickening, lesion, or calculus. PELVIC NODES:  No adenopathy.     PELVIC ORGANS:  There is a presacral mass to the left of midline adjacent to the rectum measu Rectal Once PRN   ondansetron HCl (ZOFRAN) injection 4 mg 4 mg Intravenous Q6H PRN   Metoclopramide HCl (REGLAN) injection 10 mg 10 mg Intravenous Q6H PRN   Enoxaparin Sodium (LOVENOX) 40 MG/0.4ML injection 40 mg 40 mg Subcutaneous QPM   morphINE sulfate ( no      Estimated date of discharge: Tomorrow  Discharge is dependent on: clinical progress  At this point Mr. Jassi Baum  is expected to be discharge to: home    Robe Garcia M.D.   [facility]

## 2019-06-18 NOTE — PROGRESS NOTES
Heme/Onc Progress Note    Patient Name: Ajith Packer   YOB: 1960   Medical Record Number: [id number]   CSN: [id number]   Attending Physician: Mason Shaw M.D. Subjective:  Was doing well until last night.   Ate dinner and then dur (PF) 4 MG/ML injection 8 mg, 8 mg, Intravenous, Q2H PRN  •  dextrose 10 % infusion, , Intravenous, Continuous PRN  •  LORazepam (ATIVAN) injection 0.5 mg, 0.5 mg, Intravenous, Q6H PRN  •  phenol (CHLORASEPTIC) 1.4 % oral liquid spray 1-5 spray, 1-5 spray, 06/18/19  5:52 AM   Result Value Ref Range    Glucose 97 70 - 99 mg/dL    Sodium 140 136 - 145 mmol/L    Potassium 3.6 3.5 - 5.1 mmol/L    Chloride 107 98 - 112 mmol/L    CO2 26.0 21.0 - 32.0 mmol/L    Anion Gap 7 0 - 18 mmol/L    BUN 17 7 - 18 mg/dL    Cr MD [facility]  [address],  Eladio Jeramie

## 2019-06-18 NOTE — PROGRESS NOTES
1016: PAGED DR. BRADFORD TO INFORM HER OF PATIENT REQUESTING IMODIUM.  TO PLACE ORDERS BUT WILL NOT ORDER IMODIUM AT THIS TIME. 1034: [address] TO INFORM HIM OF C-DIFF BEING NEGATIVE AND TO SEE IF HE WANTS TO KEEP SCHEDULED VANCOMYCIN.

## 2019-06-18 NOTE — PROGRESS NOTES
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 No peritoneal signs. Incisions:  Clean, dry and intact without erythema or drainage.          A/P POD #5 SB resection     1. Cont IVF until more po intake  2. Ambulate as tolerated. 3. On Protonix  4.  DVT prophyla

## 2019-06-18 NOTE — PROGRESS NOTES
PATIENT HAS IV FLUIDS INFUSING, TOLERATING A LOW FIBER DIET BUT HAS A POOR APPETITE, ON ROOM AIR, VOIDING WELL, HAVING SEVERAL LOOSE STOOLS-IMODIUM GIVEN, AMBULATES INDEPENDENTLY, INCISIONS ARE C/D/I, NORCO PRN PAIN. WILL CONTINUE TO MONITOR.

## 2019-06-19 NOTE — PLAN OF CARE
Patient alert and oriented x3  Up ad kevin  Tolerating diet  Voiding  Having loose BMs +passing gas  Lap sites to ABD are CDI      Patient assessed and ready for DC. All instructions given to patient whom verbalized understanding.  All questions answered to p Hemodynamic stability and optimal renal function maintained  Description  INTERVENTIONS:  - Monitor labs and assess for signs and symptoms of volume excess or deficit  - Monitor intake, output and patient weight  - Monitor urine specific gravity, serum osm injury  Description  (Example usage: patient with low platelets)  INTERVENTIONS:  - Avoid intramuscular injections, enemas and rectal medication administration  - Ensure safe mobilization of patient  - Hold pressure on venipuncture sites to achieve adequat

## 2019-06-19 NOTE — CM/SW NOTE
06/19/19 1500   Discharge disposition   Expected discharge disposition Home-Health   Name of Facillity/Home Care/Hospice [facility]   Discharge transportation Private car

## 2019-06-19 NOTE — DISCHARGE SUMMARY
[facility]  Discharge Summary    [facility],7Gws Patient Status:  Inpatient    1960 MRN [id number]   [facility] 3NW-A Attending Lynn Guzmán MD   [address] Day # 8 PCP SUSIE MISTRY DO     Date of Admission: 2019    Date Treated with IV fluids, IV Zofran as needed, IV Compazine as needed . Creatinine improved with IV fluids. Bicarb also improved. Potassium replaced.    patient had a CT of abdomen and pelvis done on 6/10/2019 which showed  CONCLUSION:       1.  New find Risk.     TCM Follow-Up Recommendation:Zane FREIRE > 58:  High Risk of readmission after discharge from the hospital.      PCP: Ivanna Wright DO    Consultations:   Consultants     Provider Role Specialty    South Hernandez MD Consulting known as:  ZOFRAN      Take 1 tablet (8 mg total) by mouth every 8 (eight) hours as needed for Nausea. Quantity:  30 tablet  Refills:  11     PEG 3350 Pack  Commonly known as:  MIRALAX      Take 17 g by mouth daily.    Quantity:  30 each  Refills:  1 DIO Flores    Patient Instructions:          Location Instructions: Your appointment is scheduled at the [facility] in [place]. The address is [address].  Please register at the [address] [place] [address] on Instructions: Your appointment is scheduled at the [facility] in [place]. The address is [address] [place] [address]. Please register at the [address] [place] [address] on the second floor.                     Follow up Labs: CBC in

## 2019-06-19 NOTE — CM/SW NOTE
Jameson King with [facility] met with pt. He is agreeable to home health RN. Order placed in [address] Probable discharge today.      Jitendra Aquino RN, [address] [facility]  Extension [address]

## 2019-06-19 NOTE — PROGRESS NOTES
[facility]  Progress Note     [facility],7Gws Patient Status:  Inpatient    1960 MRN [id number]   [facility] 4NW-A Attending Meredith Peraza MD   Hosp Day # 10 PCP SUSIE MISTRY DO     CC:  Intractable nausea, vomiting    SUB 06/18/2019       Imaging:  CT ABDOMEN+PELVIS (CPT=74176)     COMPARISON:  [facility] , CT CHEST+ABDOMEN+PELVIS(ALL CNTRST ONLY)(CPT=71260/17205), 4/09/2019, 18:21.      INDICATIONS:  intractable N and V     TECHNIQUE:  Unenhanced multislice CT scanning was perf adenopathy. PELVIC ORGANS:  There is a presacral mass to the left of midline adjacent to the rectum measuring 55 x 33 mm were previously measured (41 x 30 mm) consistent with worsening metastatic disease. BONES:  No bony lesion or fracture.     LUNG BAS Metoclopramide HCl (REGLAN) injection 10 mg 10 mg Intravenous Q6H PRN   Enoxaparin Sodium (LOVENOX) 40 MG/0.4ML injection 40 mg 40 mg Subcutaneous QPM   morphINE sulfate (PF) 4 MG/ML injection 2 mg 2 mg Intravenous Q2H PRN   Or      morphINE sulfate (PF) this point Mr. Dill [place]  is expected to be discharge to: home    Being discharged today by surgeon and oncology, f/u with them as directed and regular OP PCP Dean Duckworth, DO in 1 week      Shreyas Barry MD  [facility]  6/19/2019

## 2019-06-19 NOTE — PROGRESS NOTES
[facility]  Progress Note    [address] [facility],7Gws Patient Status:  Inpatient    1960 MRN [id number]   [facility] 3NW-A Attending Lucy Rodriguez MD   Hosp Day # 10 PCP SUSIE MISTRY, DO     Subjective:  Pt with less BMs.   Alanna Abreu

## 2019-06-19 NOTE — PROGRESS NOTES
Heme/Onc Progress Note    Patient Name: Aurora Gale   YOB: 1960   Medical Record Number: [id number]   CSN: [id number]   Attending Physician: Mary Rodarte M.D. Subjective:    Patient reports he is doing better.  Diarrhea improved w 2 mg, Intravenous, Q2H PRN **OR** morphINE sulfate (PF) 4 MG/ML injection 4 mg, 4 mg, Intravenous, Q2H PRN **OR** morphINE sulfate (PF) 4 MG/ML injection 8 mg, 8 mg, Intravenous, Q2H PRN  •  dextrose 10 % infusion, , Intravenous, Continuous PRN  •  LORazep 1.00 - 4.00 x10(3) uL    Monocyte Absolute 0.72 0.10 - 1.00 x10(3) uL    Eosinophil Absolute 0.18 0.00 - 0.70 x10(3) uL    Basophil Absolute 0.02 0.00 - 0.20 x10(3) uL    Immature Granulocyte Absolute 0.03 0.00 - 1.00 x10(3) uL    Neutrophil % 65.3 %    Ly Immature Granulocyte Absolute 0.04 0.00 - 1.00 x10(3) uL    Neutrophil % 66.4 %    Lymphocyte % 11.4 %    Monocyte % 15.9 %    Eosinophil % 4.9 %    Basophil % 0.6 %    Immature Granulocyte % 0.8 %   FERRITIN    Collection Time: 06/19/19  6:16 AM   Result

## 2019-06-20 NOTE — PROGRESS NOTES
Initial Post Discharge Follow Up   Discharge Date: 6/19/19  Contact Date: 6/20/2019    Consent Verification:  Assessment Completed With: Patient  HIPAA Verified?   Yes    Discharge Dx:  SBO, s/p Laparoscopic lysis of adhesions and small bowel resection f mouth. Disp:  Rfl: 0   LORazepam 0.5 MG Oral Tab Take 1 tablet (0.5 mg total) by mouth every 6 (six) hours as needed for Anxiety.  And nausea Disp: 60 tablet Rfl: 0   acetaminophen 500 MG Oral Tab Take 1,000 mg by mouth every 6 (six) hours as needed for [employer] not missing anything? yes  • Are there any reasons that keep you from taking your medication as prescribed? No  Are you having any concerns with constipation? No    Referrals/orders at D/C:  Home Health ordered at D/C? Yes    Has  been set up?   Yes   If [facility] [address] [place] [address] [place], [place], [address]  hospitals [address], [address] [phone]

## 2019-06-20 NOTE — PAYOR COMM NOTE
--------------  DISCHARGE REVIEW    Payor: [address] #:  [id number]  Authorization Number: [id number]    Admit date: 6/9/19  Admit time:  8562  Discharge Date: 6/19/2019  2:50 PM     Admitting Physician: Faraz Kaur pain on and off. Patient has history of ureteral stent. Denies any dysuria frequency. Denies any headache. Denies any chest pain or shortness of breath.   Patient states she has been taking oral nausea medication for the weekend without any improvement by surgeon. Patient had multiple bowel movements overnight on 6/17 2019 into 6/18/2019 when he had dark tarry bowel movements. Stool C. difficile and was negative. Patient on IV PPI for GI prophylaxis. Hemoglobin 8 today.   Patient improved clinically a as needed for Muscle spasms. Quantity:  20 tablet  Refills:  0     diazepam 5 MG Tabs  Commonly known as:  VALIUM      Take 1 tablet (5 mg total) by mouth every 8 (eight) hours as needed for Anxiety (1-2 PO Q8H PRN URETERAL SPASMS).    Quantity:  20 table ROSE MISTRY, DO in 1 week    Winter Biggs MD  [address] [phone]    Schedule an appointment as soon as possible for a visit in 1 week      Maximo Barrett MD   [address] [place]. The address is [address] [place] [address]. Please register at the [address] [place] [address] on the second floor. 6/27/2019 12:00 PM  [facility] [2075] 30 min.  [facility] in [address] [facility] [address] [facility]    Patient Ins REVIEWER COMMENTS

## 2019-06-21 NOTE — PROGRESS NOTES
HPI:    Lucia Billy is a 62year old male here today for hospital follow up.    He was discharged from Inpatient hospital, [facility] to Home   Admission Date: 6/9/19   Discharge Date: 6/19/19  Hospital Discharge Diagnoses (since 5/22/2019)   Sm with Dr. Williams Mcfadden with [facility] on June 13, 2019. He had an expected ileus following the procedure and was placed on NG tube to suction. Pain medications were given for pain control.   He continued to improve over the next few days and eventually NG tube was re No current facility-administered medications on file prior to visit.        HISTORY: reconciled and reviewed with patient  He  has a past medical history of Asthma, C. difficile diarrhea, Cancer ([facility] [address] Exposure to medical diagnostic radiation (09/2017), weakness  SKIN: no rashes, no suspicious lesions  HEENT: atraumatic, normocephalic, ears and throat are clear  EYES: PERRLA, EOMI, conjunctiva are clear  NECK: supple, no adenopathy  LUNGS: clear to auscultation, no r/r/w  CARDIO: RRR without murmur  GI: g

## 2019-06-24 NOTE — PROGRESS NOTES
ANP Visit Note    Patient Name: Dioni Connor   YOB: 1960   Medical Record Number: [id number]   CSN: [id number]   Date of visit: 6/24/2019       Chief Complaint/Reason for Visit:  Metastatic Rectal Cancer, Post hospital follow up    [facility] Hyponatremia     Hyperglycemia     Hydronephrosis     Acute kidney insufficiency     Hydronephrosis, unspecified hydronephrosis type     Ureteral obstruction     Pain due to ureteral stent, initial encounter (HCC)     Obstruction of left ureter     Hydrone URETEROSCOPY Left 3/9/2019    Performed by Hallie Shah MD at [facility] MAIN OR   • [address] N/A 8/11/2017    Performed by Evelyn Delaney MD at [facility] ENDOSCOPY   • FAVIOLA [address]. EXT/INT Left 11/2018   • LAPAROSCOPIC COLON RESECTION - RIG file        Minutes per session: Not on file      Stress: Not on file    Relationships      Social connections:        Talks on phone: Not on file        Gets together: Not on file        Attends [employer] witness service: Not on file        Active member of club Take 1 tablet (10 mg total) by mouth every 6 (six) hours as needed for Nausea., Disp: 30 tablet, Rfl: 3  •  AZO-CRANBERRY OR, Take 1 tablet by mouth daily. , Disp: , Rfl:   •  Cholecalciferol (VITAMIN D OR), Take 1,000 Units by mouth daily.   , Disp: , Rfl Alkaline Phosphatase 228 (H) 45 - 117 U/L    Bilirubin, Total 0.2 0.1 - 2.0 mg/dL    Total Protein 6.8 6.4 - 8.2 g/dL    Albumin 2.5 (L) 3.4 - 5.0 g/dL    Globulin  4.3 2.8 - 4.4 g/dL    A/G Ratio 0.6 (L) 1.0 - 2.0   CBC W/ DIFFERENTIAL    Collection Ti systems and currently there are no active problems.     Planned Follow Up: Thursday with Dr Macho Mares: High: Rectal Cancer     Electronically Signed by:    Bobbi Riley ANP-BC  Nurse Practitioner  [facility]

## 2019-06-24 NOTE — PROGRESS NOTES
MARY met with patient and since May he has not been able to work. Patient requesting letter with his diagnosis for his employer and can use same letter for his social security disability application. He did application on line.    MARY prepared letter and ga

## 2019-06-24 NOTE — PROGRESS NOTES
Pt here for NP f/u post hospital. Pt was in hospital 6/9-6/19. Energy level has been very low since home, appetite has been fair, eating and drinking well. Pt notes pain in rectum, using Norco TID PRN. No bowel issues, no nausea.  Pt has no further complain

## 2019-06-25 NOTE — PROGRESS NOTES
[facility]    PATIENT'S NAME: Thom Puga   ATTENDING PHYSICIAN: LESLIE Sorensen    PATIENT ACCOUNT #: [de-identified] LOCATION: [address] [place] [address] RECORD #: [id number] YOB: 1960   DATE OF SERVICE: 02/28/2019       [facility] P adenopathy. LUNGS:  Resonant to percussion and clear to auscultation. No wheezing, rales, or rhonchi. HEART:  Regular S1 and S2 with no murmur or gallop. ABDOMEN:  No hepatosplenomegaly or tenderness.    EXTREMITIES:  He has no clubbing, cyanosis, or ed

## 2019-06-25 NOTE — PROGRESS NOTES
MARY printed and mailed medical records for patient. He applied online on 6/23 for social security benefits.

## 2019-06-27 NOTE — PROGRESS NOTES
MARY received two more requests for this patient to expedite records to social Security Disability. Leslie Cruz also has faxed records. MARY faxed second set of medical records to [facility] at fax # [phone].

## 2019-06-27 NOTE — PROGRESS NOTES
Post hospital Md f/u for metastatic rectal ca. Here today to discuss POC. Reports abdominal pain and pain with urination. Believes it is due to ureteral stent. He is due for exchange and having trouble getting an earlier appointment time with Dr. Anuj Brown.  He

## 2019-06-28 NOTE — PAYOR COMM NOTE
--------------  DISCHARGE REVIEW    Payor: [address] #:  [id number]  Authorization Number: [id number]    Admit date: 6/9/19  Admit time:  0018  Discharge Date: 6/19/2019  2:50 PM     Admitting Physician: Meredith Peraza pain on and off. Patient has history of ureteral stent. Denies any dysuria frequency. Denies any headache. Denies any chest pain or shortness of breath.   Patient states she has been taking oral nausea medication for the weekend without any improvement by surgeon. Patient had multiple bowel movements overnight on 6/17 2019 into 6/18/2019 when he had dark tarry bowel movements. Stool C. difficile and was negative. Patient on IV PPI for GI prophylaxis. Hemoglobin 8 today.   Patient improved clinically a as needed for Muscle spasms. Quantity:  20 tablet  Refills:  0     diazepam 5 MG Tabs  Commonly known as:  VALIUM      Take 1 tablet (5 mg total) by mouth every 8 (eight) hours as needed for Anxiety (1-2 PO Q8H PRN URETERAL SPASMS).    Quantity:  20 table ROSE MISTRY,  in 1 week    Jose Alcala MD  [address] [phone]    Schedule an appointment as soon as possible for a visit in 1 week      Nisa Cruz MD  [address] [place] [address] Dr Fitzpatrick [address] [facility] Michele [place]. The address is Pedro Chowdhury [address] [place] [address]. Please register at the [address] [place] [address] on the second floor. 6/27/2019 12:00 PM  [facility] [2075] 30 min.  [facility] in [address] [facility] [address] [facility]    Patient Ins REVIEWER COMMENTS

## 2019-07-01 PROBLEM — C20 RECTAL CANCER METASTATIC TO BONE (HCC): Status: ACTIVE | Noted: 2019-01-01

## 2019-07-01 PROBLEM — C79.51 RECTAL CANCER METASTATIC TO BONE (HCC): Status: ACTIVE | Noted: 2019-01-01

## 2019-07-01 PROBLEM — R52 INTRACTABLE PAIN: Status: ACTIVE | Noted: 2019-01-01

## 2019-07-01 NOTE — TELEPHONE ENCOUNTER
Per [address] Rx, plan does not require PA for Hydrocodone 10/325. States that override was needed since pt recently filled 5/325 dose. Pharmacy should be able to fill new dose now without issue. Pt made aware and verbalized understanding.  While on th

## 2019-07-01 NOTE — TELEPHONE ENCOUNTER
Spoke to Von Nogueira about CT - new left hydro. Progression of cancer in other sites. Needs stent revision - in a ton of pain.   Admitting for pain control and stat urology consult to arrange for stent replacement,  ALINA Moctezuma

## 2019-07-01 NOTE — PROGRESS NOTES
NURSING ADMISSION NOTE      Patient admitted via Wheelchair  Oriented to room. Safety precautions initiated. Bed in low position. Call light in reach. Pt AOx4. Appearing fatigued. 10/10 pain. Med rec completed. Family at bedside. Port accessed.  I

## 2019-07-01 NOTE — TELEPHONE ENCOUNTER
MD Gokul Patel, ESMER; DIO Mejía   Caller: Unspecified (Today,  9:13 AM)             I spoke to Dr Michael Guidry needs a non-contrast CT abd and pelvis asap.  This could be his cancer causing the pain rather than the stent.  I am

## 2019-07-01 NOTE — TELEPHONE ENCOUNTER
Patient instructed to go to ER registration for direct admission to room 420.  Report called to [facility]

## 2019-07-02 PROBLEM — R33.8 ACUTE URINARY RETENTION: Status: ACTIVE | Noted: 2019-01-01

## 2019-07-02 NOTE — PLAN OF CARE
Pt AOx4, fatigued. Some relief in pain after sullivan catheter placement. UA/cx obtained. Pt NPO for urology consult. IVF per [facility]. Potassium and magnesium replacement per protocol. Prn dilaudid for pain. VSS.  SBP elevated d/t pain--continue present management

## 2019-07-02 NOTE — ANESTHESIA PREPROCEDURE EVALUATION
PRE-OP EVALUATION    Patient Name: Dioni Connor    Pre-op Diagnosis: Calculi, ureter [N20.1]    Procedure(s):  CYSTOSCOPY, LEFT RETROGRADE PYELOGRAM, EXCHANGE OF LEFT URETERAL STENT    Surgeon(s) and Role:     Robel Marques MD - Primary    Pre-op v ondansetron HCl (ZOFRAN) injection 4 mg 4 mg Intravenous Q6H PRN       Outpatient Medications:    HYDROmorphone HCl 2 MG Oral Tab Take 1 tablet (2 mg total) by mouth every 4 (four) hours as needed for Pain.  Disp: 120 tablet Rfl: 0   Trifluridine-Tipiraci Cardiovascular                                                       Endo/Other                                  Pulmonary      (+) asthma                     Neuro/Psych        (+) anxiety                      Rectal ca w/ mets  cdiff      Past Surgical RDW 18.6 (H) 07/02/2019    .0 07/02/2019     Lab Results   Component Value Date     (L) 07/02/2019    K 3.8 07/02/2019     07/02/2019    CO2 27.0 07/02/2019    BUN 9 07/02/2019    CREATSERUM 1.00 07/02/2019    GLU 98 07/02/2019    CA 9.4

## 2019-07-02 NOTE — PROGRESS NOTES
[facility]  Progress Note     Costhue Billy Patient Status:  Inpatient    1960 MRN [id number]   [facility] 4NW-A Attending Haleigh Baxter MD   Hosp Day # 1 PCP Tori Mathias DO     Chief Complaint: urinary obstructi component of BPH, malignant obstruction, narcotic effects/p sullivan with 1200 out  1. Add uroaxatral  2. Urology consult   3. Hyponatremia- IVF and monitor  4. ACD- stable  5. Chronic pain 2/2 malignancy- analgesics PRN  6.  Malnutrition- appreciate dietary e

## 2019-07-02 NOTE — BRIEF OP NOTE
Pre-Operative Diagnosis: Calculi, ureter [N20.1]     Post-Operative Diagnosis: LEFT SIDED HYDRONEPHROSIS      Procedure Performed:   Procedure(s):  CYSTOSCOPY, LEFT RETROGRADE PYELOGRAM, LEFT URETEROSCOPY, LEFT URETERAL STENT REMOVAL    Surgeon(s) and Role

## 2019-07-02 NOTE — ANESTHESIA POSTPROCEDURE EVALUATION
[address] Patient Status:  Inpatient   Age/Gender 62year old male MRN [id number]   [facility] SURGERY Attending Sam Silva MD   Hosp Day # 1 PCP SUSIE MISTRY, DO       Anesthesia Post-op Note    Procedure(

## 2019-07-02 NOTE — CONSULTS
[facility]  Report of Consultation    Elliotmirian Arboleda Patient Status:  Inpatient    1960 MRN [id number]   [facility] 4NW-A Attending Arnoldo Huitron MD   Hosp Day # 1 PCP [address] [place] [address],      Reason for Consultation:    Met Rectal cancer [facility])    • Renal disorder     swelling of left kidney   • Syncope    • Visual impairment     glasses     Past Surgical History:   Procedure Laterality Date   • COLONOSCOPY     • COLONOSCOPY, POSSIBLE BIOPSY, POSSIBLE POLYPECTOMY 86974 N/A 7/3 Continuous  •  LORazepam (ATIVAN) tab 1 mg, 1 mg, Oral, Q4H PRN **OR** LORazepam (ATIVAN) injection 1 mg, 1 mg, Intravenous, Q4H PRN  •  PEG 3350 (MIRALAX) powder packet 17 g, 17 g, Oral, BID  •  HYDROmorphone HCl (DILAUDID) 1 MG/ML injection 1 mg, 1 mg, I hepatosplenomegaly. 4 cm soft tissue mass underlying surgical site. Bladder fells grossly distended. Mild tenderness in perineum with no discretely palpable mass. Extremities: Pedal pulses are present. No edema and no tenderness.    Neurological: Gross uL    Lymphocyte Absolute 0.64 (L) 1.00 - 4.00 x10(3) uL    Monocyte Absolute 0.79 0.10 - 1.00 x10(3) uL    Eosinophil Absolute 0.15 0.00 - 0.70 x10(3) uL    Basophil Absolute 0.02 0.00 - 0.20 x10(3) uL    Immature Granulocyte Absolute 0.02 0.00 - 1.00 x10 Technologist)  patient states he has had left sided flank pain for 1 week. He states the pain is more intense today.          FINDINGS:    LIVER:  There are multiple intrahepatic lesions of low density, suspicious for metastatic disease that is suboptimally size from the previous study currently measuring 6.5 x 4.4 cm as opposed to 5.5 x 3.3 cm on the previous study.   ABDOMINAL WALL:  There has been interval development of a subcutaneous ovoid nodule/mass within the right anterolateral abdominal wall measurin Hyperglycemia     Hydronephrosis     Acute kidney insufficiency     Hydronephrosis, unspecified hydronephrosis type     Ureteral obstruction     Pain due to ureteral stent, initial encounter (HCC)     Obstruction of left ureter     Hydronephrosis, left Transfuse if below 7. Iron if iron sat is low. Some anemia of inflammation. 6)  Generalized weakness - due to all of the above    7)  Malnutrition - malignancy related cachexia. May add mirtazapine.       Rosemary Gregory  7/2/2019  6:54 AM

## 2019-07-02 NOTE — H&P
[facility]                                                               History & Physical         Edwinruperto Case [place] Patient Status:  Inpatient    1960 MRN [id number]   [facility] 4NW-A Attending Mary Jo Sheridan MD   [facility] Day # POLYPECTOMY 25700 N/A 7/31/2017    Performed by Darren Nowak MD at [facility]   • [place] Left 11/27/2018    Performed by Shreyas Kerns MD at [facility]   • [facility]    Performed by Brent Salcido on days 1-5, and then days 8-12 in a 28-day cycle. Disp: 80 tablet Rfl: 11 Past Month at Unknown time   fentaNYL 25 MCG/HR Transdermal Patch 72 Hr Place 1 patch onto the skin every third day.  Disp:  Rfl:  6/30/2019 at Unknown time   HYDROcodone-acetaminoph total) by mouth 3 (three) times daily as needed for Muscle spasms. Disp: 20 tablet Rfl: 0 More than a month at Unknown time       Review of Systems:  A comprehensive 14 point review of systems was completed.   Pertinent positives and negatives noted in the pain and left-sided flank pain for 1 week. History of rectal cancer. TECHNIQUE:  Unenhanced multislice CT scanning was performed from the dome of the diaphragm to the pubic symphysis. Dose reduction techniques were used.  Dose information is transmitt left retroperitoneal mass adjacent to the left ureteropelvic junction has at interval surgical intervention with development of left UPJ obstruction. BOWEL/MESENTERY:  The stomach, duodenum sweep and small bowel are unremarkable.   There is extensive retai abdominal wall concerning for new potential metastatic focus  1. Oncology consult  2. Pain management  2.  Left-sided abdominal pain with moderate left hydronephrosis with possible left UPJ obstruction due to malignancy with left-sided retroperitoneal mass

## 2019-07-02 NOTE — DIETARY MALNUTRITION NOTE
[facility]    NUTRITION INITIAL ASSESSMENT    Pt meets (chronic, moderate) malnutrition criteria.     CRITERIA FOR MALNUTRITION DIAGNOSIS:  Criteria for non-severe malnutrition diagnosis: chronic illness related to wt loss 10% in 6 months and muscle ma HISTORY:  Appetite: Fair  Intake: n/a  Intake Meeting Needs: Marginal, added supplements  Food Allergies: No  Cultural/Ethnic/Orthodox Preferences Addresses: Yes    NUTRITION RELATED PHYSICAL FINDINGS:     1. Body Fat/Muscle Mass: mild depletion muscle ma

## 2019-07-02 NOTE — CONSULTS
[facility] Urology   Consultation Note    Ashvin Mcnally Patient Status:  Inpatient    1960 MRN [id number]   [facility] 4NW-A Attending Lynne Duncan MD   Hosp Day # 1 PCP SUSIE MISTRY,      Reason for [facility] disorder     swelling of left kidney   • Syncope    • Visual impairment     glasses     Past Surgical History:   Procedure Laterality Date   • COLONOSCOPY     • COLONOSCOPY, POSSIBLE BIOPSY, POSSIBLE POLYPECTOMY 68227 N/A 7/31/2017    Performed by Ajith Howard, Sulfate IVPB premix SOLN 2 g, 2 g, Intravenous, Once  •  potassium chloride IVPB premix 20 mEq, 20 mEq, Intravenous, Once  •  Alfuzosin HCl ER (UROXATRAL) 24 hr tab 10 mg, 10 mg, Oral, Daily with breakfast  •  0.9% NaCl infusion, , Intravenous, Continuous BACK: Without CVA tenderness. GENITOURINARY: sullivan catheter in place draining yellow urine. SKIN: Without stigmata. NEUROLOGIC: Grossly intact. EXTREMITIES: Without edema.       Laboratory Data:  Lab Results   Component Value Date    WBC 6.0 07/0 suspicious for secondary stricture of the proximal left ureter. Right kidney is unremarkable. ADRENALS:  No mass or enlargement. AORTA/VASCULAR:    Unremarkable as seen on non-contrast imaging.    RETROPERITONEUM:  Prior noted left retroperitoneal mass throughout the colon.        Impression:  Patient Active Problem List:     Other malignant neoplasm of skin, site unspecified     Unspecified asthma(493.90)     Rectal cancer metastasized to liver ([facility] [address]     Anxiety     Weakness generalized     Reactive depr retrograde pyelogram, and exchange of left ureteral stent today.   Reviewed risks, benefits, alternatives, and complications of the procedure including but not limited to risk of anesthesia (MI, blood clot, stroke, death), bladder/ureteral injury, use of ne

## 2019-07-02 NOTE — PROGRESS NOTES
07/02/19 1501   Clinical Encounter Type   Visited With Patient   Patient's Supportive Strategies/Resources [facility] provided prayer, Scripture and Sacrament of the sick.    Adventism Encounters   Adventism Needs Prayer   Sacramental Encounters   Sacrament

## 2019-07-03 PROBLEM — D50.0 IRON DEFICIENCY ANEMIA DUE TO CHRONIC BLOOD LOSS: Status: ACTIVE | Noted: 2019-01-01

## 2019-07-03 NOTE — PROGRESS NOTES
[facility]  Progress Note     Kelly Harrington Patient Status:  Inpatient    1960 MRN [id number]   [facility] 4NW-A Attending Gregory Nichols MD   Hosp Day # 2 PCP Justa Meyers DO     Chief Complaint: \" pain is fairly co 61 mL/min (based on SCr of 1.1 mg/dL). Recent Labs   Lab 07/01/19  1813   PTP 14.2   INR 1.05       No results for input(s): TROP, CK in the last 168 hours. Imaging: Imaging data reviewed in Epic.     Medications:   • iron sucrose  200 mg David Ralph

## 2019-07-03 NOTE — PROGRESS NOTES
Heme/Onc Progress Note    Patient Name: Jacquie Kay   YOB: 1960   Medical Record Number: [id number]   CSN: [id number]   Attending Physician: Todd Lara M.D. Subjective:  Much more comfortable today.   No N or V.  Pain control ad cap 2 mg, 2 mg, Oral, QID PRN  •  HYDROcodone-acetaminophen (NORCO) 5-325 MG per tab 1-2 tablet, 1-2 tablet, Oral, Q6H PRN  •  hydrocortisone Ace-Pramoxine (PROCTOFOAM-HS) 1-1 % rectal foam 1 applicator, 1 applicator, Rectal, BID  •  HYDROmorphone HCl (DIL (8)    Collection Time: 07/03/19  5:43 AM   Result Value Ref Range    Glucose 102 (H) 70 - 99 mg/dL    Sodium 139 136 - 145 mmol/L    Potassium 4.7 3.5 - 5.1 mmol/L    Chloride 106 98 - 112 mmol/L    CO2 27.0 21.0 - 32.0 mmol/L    Anion Gap 6 0 - 18 mmol/L May be soon talking only palliative care.     2)  Left flank pain and increased hydro -  stent obstruction. Urology unable to pass stent - to get perc neph tube today and hope to eventually stent antegrade. .     3)  Urinary retention - urology to see - f

## 2019-07-03 NOTE — OPERATIVE REPORT
[facility]    PATIENT'S NAME: Snow Palomo   ATTENDING PHYSICIAN: Sidney Narvaez M.D. OPERATING PHYSICIAN: Kaden Bejarano M.D.    PATIENT ACCOUNT#:   [de-identified]    LOCATION:  [address] [place] [address]  MEDICAL RECORD #:   [id number]       DATE OF BIRTH:  [birth year] Next, I used a flexible grasper to bring the distal end of the stent down out through the meatus. The Glidewire was then passed up the stent into to the mid ureter. The wire then turned back on itself back down the ureter.   I then passed an open-ended ca

## 2019-07-03 NOTE — PROGRESS NOTES
[facility]  Urology Progress Note    Jeronimo Martínez Patient Status:  Inpatient    1960 MRN [id number]   [facility] 4NW-A Attending Zhane Ordoñez MD   Hosp Day # 2 PCP SUSIE MISTRY, DO     Subjective:  Jassi Gentile is metastatic focus. 5. Extensive retained feces throughout the colon. Assessment:    Metastatic rectal cancer    Left flank pain, left hydronephrosis , had left ureteral stent in place.   Had cystoscopy, left RGPG, left URS, left ureteral stent removal 7/

## 2019-07-03 NOTE — PLAN OF CARE
Assumed care of pt at [address] [facility]. Family at bedside. C/o pain to his left flank and rectum. Having incontinence of soft to slightly loose frequent stools. Wall in place with tea colored urine.  Npo post mn for procedure in am. Pt unsteady and bed alarm on, remin

## 2019-07-03 NOTE — PAYOR COMM NOTE
--------------  ADMISSION REVIEW     Payor: [address] #:  [id number]  Authorization Number: [id number]    Admit date: 7/1/19  Admit time: [employer]   • CYSTOSCOPY STENT INSERTION Left 11/27/2018    Performed by Kareen Aguayo MD at [address] [place] [address], [address] Left 9/1/2018    Performed by Kareen Aguayo MD at [facility] MAIN OR   • CYSTOSCOPY URETEROSCOPY Left 4/30/2019    Performed by [facility] Date    WBC 5.7 07/01/2019    HGB 7.5 07/01/2019    HCT 23.6 07/01/2019    .0 07/01/2019    CREATSERUM 1.00 07/01/2019    BUN 11 07/01/2019     07/01/2019    K 3.5 07/01/2019     07/01/2019    CO2 28.0 07/01/2019    GLU 98 07/01/2019 left ureteropelvic junction has at interval surgical intervention with development of left UPJ obstruction. BOWEL/MESENTERY:  The stomach, duodenum sweep and small bowel are unremarkable. There is extensive retained feces throughout the colon.   There is management  2. Left-sided abdominal pain with moderate left hydronephrosis with possible left UPJ obstruction due to malignancy with left-sided retroperitoneal mass  1. Urology and oncology on consult  2. Pain management  3. Hyponatremia  1.  IV fluids, fol increased left flank pain. Also gradual increase in inability to void. Called yesterday. Stat OP CT showed developing hydro despite stent and relatively distended bladder. Has evidence of rapid growth of mets in pelvis and liver.   Also has right abd so mirtazapine.       sullivan placed with 1200 out    TO OR:    Pre-Operative Diagnosis: Calculi, ureter [N20.1]     Post-Operative Diagnosis: LEFT SIDED HYDRONEPHROSIS      Procedure Performed:   Procedure(s):  CYSTOSCOPY, LEFT RETROGRADE PYELOGRAM, LEFT URETER RN    7/2/2019 1703 Given 0.4 mg Intravenous Masoud Bonilla, ESMER    7/2/2019 1658 Given 0.4 mg Intravenous Alda Hernandez, ESMER      HYDROmorphone HCl (DILAUDID) tab 2 mg     Date Action Dose Route User    7/2/2019 2008 Given 2 mg Oral Danni Marleen,

## 2019-07-03 NOTE — PROCEDURES
[facility]  Procedure Note    Miguel A Amaro Patient Status:  Inpatient    1960 MRN [id number]   [facility] Nay Damon MD   Hosp Day # 2 PCP SUSIE MISTRY,      Procedure: L NOAHN stef

## 2019-07-03 NOTE — PLAN OF CARE
Pt back from IR via bed, s/p L nephrostomy tube placement. He is alert and oriented, slightly drowsy, with a 6/10 pain level, which he reports as tolerable, VS stable. He was offered clear liquids, frequent vital signs and bed rest observed.  Family at beds

## 2019-07-03 NOTE — CM/SW NOTE
07/03/19 1300   CM/SW Referral Data   Referral Source Social Work (self-referral)   Reason for Referral Discharge planning   Informant Patient;Spouse; Children   Patient Info   Patient's Mental Status Alert;Oriented   Patient's Home Environment House   N

## 2019-07-03 NOTE — PROGRESS NOTES
07/03/19 1747   Clinical Encounter Type   Visited With Patient and family together  (Mother and daughter at bedside)   Routine Visit Introduction   Continue Visiting No   Patient's Supportive Strategies/Resources  provided emotional support, inc

## 2019-07-03 NOTE — H&P
[address] Patient Status:  Inpatient    1960 MRN [id number]   Location [facility] Attending Madhu Spaulding MD   Hosp Day # 2 PCP [address] [place] [address]     Admitting Diagnosis 09/01/2018    S/p cystoscopy, retrograde pyelogram, stent insertion, left   • OTHER SURGICAL HISTORY  11/27/2018    CYSTOSCOPY, LEFT URETERAL STENT REMOVAL EXCHANGE, LEFT RETROGRADE PYLOGRAM   • PART REMOVAL COLON W END COLOSTOMY     • PORT, INDWELLING, IM Impression: 63 yo M hydronephrosis on L s/p ureteral stent removal    I have discussed with the patient and/or legal representative the potential benefits, risks, and side effects of this procedure, the likelihood of the patient achieving goals; and the

## 2019-07-04 NOTE — PROGRESS NOTES
07/04/19 1149   Clinical Encounter Type   Visited With Patient and family together;Health care provider  (wife, children, RN, physician)   Routine Visit Follow-up   Continue Visiting   (if requested)   Referral From Nurse  (RN stated that family/patient

## 2019-07-04 NOTE — PROGRESS NOTES
Assumed care at 0730;pt. in bed awake and alert,oriented x3  0810-up to the bathroom with PCt;stated when done voiding getting up and got dizzy and found on the floor in kneeling pos;paged  and notified; pt.remains alert and oriented;able to move a

## 2019-07-04 NOTE — PROGRESS NOTES
[facility]  Progress Note     Allen Cottertes Patient Status:  Inpatient    1960 MRN [id number]   [facility] 4NW-A Attending Diana Velazquez MD   Hosp Day # 3 PCP Satya Lala DO     Chief Complaint: fell in the bathroom 4.7 3.7    101 106 105   CO2 28.0 27.0 27.0 30.0   ALKPHO 219*  --   --   --    AST 19  --   --   --    ALT 19  --   --   --    BILT 0.2  --   --   --    TP 6.9  --   --   --        Estimated Creatinine Clearance: 56 mL/min (based on SCr of 1.13 mg/d

## 2019-07-04 NOTE — PROGRESS NOTES
[facility] Urology Progress Note    Willow Martínez Patient Status:  Inpatient    1960 MRN [id number]   [facility] 4NW-A Attending Angelica Erickson MD   Hosp Day # 3 PCP SUSIE MISTRY, DO     Subjective:  Manolo Srinivasan Mass of perirectal soft tissue     Intractable migraine without aura     Hyponatremia     Hyperglycemia     Hydronephrosis     Acute kidney insufficiency     Hydronephrosis, unspecified hydronephrosis type     Ureteral obstruction     Pain due to ureteral

## 2019-07-04 NOTE — PROGRESS NOTES
[facility]  Progress Note       [facility] Patient Status:  Inpatient    1960 MRN [id number]   [facility] 4NW-A Attending Taylor Jo MD   Hosp Day # 3 PCP SUSIE MISTRY, DO       Subjective:   States that he feels be

## 2019-07-04 NOTE — PROGRESS NOTES
Heme/Onc Progress Note - [facility]      Chief Complaint:    Follow up for evaluation and management of rectal cancer. Interim History:      Patient had fall this morning. He had no LOC. He has control of his abdominal pain.  He is on a fentanyl patch 25 mcg/h limited to bleeding, infection, and injury to adjacent structures was discussed with the patient. The patient was draped   and prepped in the usual sterile fashion in the prone position on the fluoroscopy table.  Maximum sterile barrier technique was used f visits.    3)  Urinary retention - urology following - sullivan for now.  uroxatrol started.  Suspect due to BPH, narcotics and now may be neurogenic given growing lesion in pelvis.    4)  Pain control - Continue fentanyl.  Dilaudid prn.     5)  Anemia - s/p

## 2019-07-04 NOTE — PLAN OF CARE
Problem: PAIN - ADULT  Goal: Verbalizes/displays adequate comfort level or patient's stated pain goal  Description  INTERVENTIONS:  - Encourage pt to monitor pain and request assistance  - Assess pain using appropriate pain scale  - Administer analgesics of care  - Consider collaborating with pharmacy to review patient's medication profile  - Implement strategies to promote bladder emptying  Outcome: Progressing     Problem: METABOLIC/FLUID AND ELECTROLYTES - ADULT  Goal: Electrolytes maintained within nor blood products/factors as ordered and appropriate  Outcome: Progressing     Problem: MUSCULOSKELETAL - ADULT  Goal: Return mobility to safest level of function  Description  INTERVENTIONS:  - Assess patient stability and activity tolerance for standing, tr

## 2019-07-04 NOTE — PROGRESS NOTES
07/04/19 1017   Clinical Encounter Type   Visited With Patient and family together;Health care provider  (wife and RN)   Routine Visit Follow-up   Continue Visiting   (if requested)   Referral From Nurse;     briefly with patient and his

## 2019-07-05 NOTE — PHYSICAL THERAPY NOTE
PHYSICAL THERAPY EVALUATION - INPATIENT     Room Number: 420/420-A  Evaluation Date: 7/5/2019  Type of Evaluation: Initial  Physician Order: PT Eval and Treat    Presenting Problem: L hydronephrosis s/p nephrostomy tube placement 7/3/19  Reason for [facility] MAIN OR   • CYSTOSCOPY URETEROSCOPY Left 7/2/2019    Performed by Sameera Flores MD at [address]   • CYSTOSCOPY URETEROSCOPY Left 4/30/2019    Performed by Saar French MD at [address]   • CYSTOSCOPY URETEROSCOPY Left 3/9/2019    Performed by Alfie Isbell for assistance and decreased awareness of need for safety    RANGE OF MOTION AND STRENGTH ASSESSMENT  Upper extremity ROM and strength are within functional limits     Lower extremity ROM is within functional limits     Lower extremity strength is within f ambulated into bathroom and used urinal with supervision. Pt educated on using HR for safety. Pt then agreeable to ambulation, however, distracted by multiple family members present to visit.  Pt asked not to hold onto IV pole a few times, but continues to comorbidities manifest themselves as functional limitations in independent bed mobility, transfers, and gait.  The patient is below baseline and would benefit from skilled inpatient PT to address the above deficits to assist patient in returning to prior to

## 2019-07-05 NOTE — PROGRESS NOTES
Patient is alert,oriented. Wall catheter discontinued by previous shift. Patient has no void this shift. Bladderscan performed,with 465 ml noted. Patient advised of need for straight cath. Patient states that he can feel the need to void,he can void,if he ca

## 2019-07-05 NOTE — DISCHARGE SUMMARY
[facility] HOSPITALIST  DISCHARGE SUMMARY     [address] [facility],7[facility] Patient Status:  Inpatient    1960 MRN [id number]   [facility] 4NW-A Attending No att. providers found   Hosp Day # 4 PCP SUSIE MISTRY DO     Date of Admission: 2019 82  59-90 High Risk  29-58 Medium Risk  0-28   Low Risk. TCM Follow-Up Recommendation:  LACE > 58:  High Risk of readmission after discharge from the hospital.    Consultants:  • Oncology, urology   Discharge Medication List:     Discharge Medications nausea   Quantity:  60 tablet  Refills:  0     Ondansetron HCl 8 MG tablet  Commonly known as:  ZOFRAN      Take 1 tablet (8 mg total) by mouth every 8 (eight) hours as needed for Nausea.    Quantity:  30 tablet  Refills:  11     PEG 3350 Pack  Commonly kno 2:30 PM  INFUSION [1980] 60 min. [facility] in [facility] 8    Patient Instructions:          Location Instructions: Your appointment is scheduled at the [facility] in [place].  The address is [address] [place] [address]

## 2019-07-05 NOTE — TELEPHONE ENCOUNTER
Verified Lonsurf script with [place] pharmacist over the phone. They will contact pt for delivery date.

## 2019-07-05 NOTE — CM/SW NOTE
07/05/19 1400   Discharge disposition   Expected discharge disposition Home or Self   Name of Facillity/Home Care/Hospice Residential   Informed [facility] the pt will dc today. Asked them to meet w/family in regards to managing nephrostomy tubes.

## 2019-07-05 NOTE — PROGRESS NOTES
Heme/Onc Progress Note    Patient Name: Jennifer Pryor   YOB: 1960   Medical Record Number: [id number]   CSN: [id number]   Attending Physician: Sunshine Slaughter M.D.      Subjective:  Yesterday had voiding trial - was unable to go for about (IMODIUM) cap 2 mg, 2 mg, Oral, QID PRN  •  HYDROcodone-acetaminophen (NORCO) 5-325 MG per tab 1-2 tablet, 1-2 tablet, Oral, Q6H PRN  •  hydrocortisone Ace-Pramoxine (PROCTOFOAM-HS) 1-1 % rectal foam 1 applicator, 1 applicator, Rectal, BID  •  HYDROmorphon imaging. Impression and Plan:  1)  Metastatic rectal cancer - BRAF mutated which has a particularly virulent phenotype.  Progressed on combined EGFR antibody and BRAF/MEK inhibition.  Options are poor - plan to try trifluridine/tipiracil but this has a l

## 2019-07-05 NOTE — CM/SW NOTE
SW followed up w/pt to confirm dc plan. Pt aware HH will not be there everyday. Gave pt resources to hire additional nurses/LPNs/caregivers if he needed additional assistance w/sullivan and nephrostomy tubes.

## 2019-07-05 NOTE — DIETARY MALNUTRITION NOTE
[facility]     NUTRITION INITIAL ASSESSMENT     Pt meets (chronic, moderate) malnutrition criteria.     CRITERIA FOR MALNUTRITION DIAGNOSIS:  Criteria for non-severe malnutrition diagnosis: chronic illness related to wt loss 10% in 6 months and muscle 18% weight loss in 6 months per EMR, which is severe. Pt does not like magic cup, wants to try ensure with ice cream (chocolate) to make a milkshake to increase PO.  Per physical exam, pt has mild muscle wasting to the clavicle, thigh and calf regions.

## 2019-07-05 NOTE — PROGRESS NOTES
[facility]  Urology Progress Note    Mary Martínez Patient Status:  Inpatient    1960 MRN [id number]   [facility] 4NW-A Attending Catrina Castillo MD   Hosp Day # 4 PCP SUSIE MISTRY, DO     Subjective:  Kika Abbott is

## 2019-07-05 NOTE — PROGRESS NOTES
[facility] H[facility]PITAL[facility]  Progress Note     Margery Leventhal Patient Status:  Inpatient    1960 MRN [id number]   [facility] 4NW-A Attending Rinku Rogers MD   Hosp Day # 4 PCP Anitha Adams DO     Chief Complaint: urinary retention CO2 28.0 27.0 27.0 30.0  --    ALKPHO 219*  --   --   --   --    AST 19  --   --   --   --    ALT 19  --   --   --   --    BILT 0.2  --   --   --   --    TP 6.9  --   --   --   --        Estimated Creatinine Clearance: 55.6 mL/min (based on SCr of 1.13

## 2019-07-05 NOTE — PLAN OF CARE
Bladder scanned for 240 cc , has not voided , dors not have urge to void , will monitor    ;1130 BLADDER EVXV=424, UNABLR TO VOID , WANTS TO WAIT 15 MINS TO TRY AGAIN, . ..1330, has walked cont to take in fluid , IV at 83cc, appetite fair , urinary retentio

## 2019-07-06 NOTE — PROGRESS NOTES
Discharge summary completed and discussed , portacath heparinized as per protocol , dressing to left nephrostomy bintact , clean / dry , sullivan cath present

## 2019-07-08 NOTE — TELEPHONE ENCOUNTER
Wife called that patient was doing odd things, like going into the kitchen without clothes on to get paper towels to clean himself, gave call back number to discuss further

## 2019-07-08 NOTE — PROGRESS NOTES
Patients wife called regarding wound and incontinent precautions. Patient's wife wanted to talk to Carleene Essex APN today. Reannaade Murphy is doing his chemo education tomorrow but the wife cannot be there. He is coming with his two adult daughters.  One is studying to be

## 2019-07-09 NOTE — PROGRESS NOTES
ORAL CHEMOTHERAPY EDUCATION RECORD  Learner:  Keira Bello and 3 daughters     Barriers / Limitations: Pain      Diagnosis:   Metastatic Rectal Cancer     Medication Name:   Corbin Muckle    Dose:   4 tablets  Frequency:  twice a day for 5 days, 2 days off, 5 days, the spent a total of 40 minutes with the patient, 100 % of that time was spent counseling patient regarding the above documented side effects and management, when to call provider and contact information.      Callaway Channel ANP-BC  Nurse Practitioner  Debbie Glynn

## 2019-07-11 NOTE — PROGRESS NOTES
Education Record    Learner:  Patient    Disease / Diagnosis: venofer infusion/hydration    Barriers / Limitations:  Fatigue   Comments:    Method:  Brief focused and Discussion   Comments:    General Topics:  Medication, Side effects and symptom managemen

## 2019-07-11 NOTE — PROGRESS NOTES
ANP Visit Note    Patient Name: Jennifer Pryor   YOB: 1960   Medical Record Number: [id number]   CSN: [id number]   Date of visit: 7/11/2019       Chief Complaint/Reason for Visit:  Metastatic Rectal Cancer    Oncologic NYWRYBY:           7/3 lymph nodes (HCC)     Mass of perirectal soft tissue     Intractable migraine without aura     Hyponatremia     Hyperglycemia     Hydronephrosis     Acute kidney insufficiency     Hydronephrosis, unspecified hydronephrosis type     Ureteral obstruction 11/27/2018    Performed by Radha Mccoy MD at [address] [place] [address] Drive, [address] Left 9/1/2018    Performed by Radha Mccoy MD at [facility] MAIN OR   • CYSTOSCOPY URETEROSCOPY Left 7/2/2019    Performed by Kalyan Khan MD at [address] [place] [address] [id number] [place] [address] file        Non-medical: Not on file    Tobacco Use      Smoking status: Never Smoker      Smokeless tobacco: Never Used    Substance and Sexual Activity      Alcohol use: No        Alcohol/week: 0.0 oz        Frequency: Never        Comment: Occasional Foam, Place 1 applicator rectally 2 (two) times daily. , Disp: 1 Can, Rfl: 1  •  Loperamide HCl 2 MG Oral Cap, Take 1 capsule (2 mg total) by mouth., Disp: , Rfl: 0  •  LORazepam 0.5 MG Oral Tab, Take 1 tablet (0.5 mg total) by mouth every 6 (six) hours as Abdomen: Soft, non tender with good bowel sounds. Indwelling sullivan in place   Extremities: No edema. Neurological: Grossly intact. Lymphatics:  There is no palpable lymphadenopathy throughout in the cervical,supraclavicular, axillary, or inguinal tara Neutrophil % 82.8 %    Lymphocyte % 5.8 %    Monocyte % 9.0 %    Eosinophil % 1.3 %    Basophil % 0.4 %    Immature Granulocyte % 0.7 %       Impression/Plan:  1. Metastatic Rectal Cancer: encouraged him to start the [address] today.    2. KELI: Last dose I

## 2019-07-15 NOTE — PROGRESS NOTES
PT here for fluids and to see APN. Pt c/o pain to abdomen where tube inserted. Pt arrived from Dr office who replaced tube with local. Jess Kapoor ordered dilaudid 2mg.  Alerted Antonia to allergy and was ordered to give any way due to not true allergy (fatigue/rachna

## 2019-07-15 NOTE — PROGRESS NOTES
Pt family member requested a time change for 7/18 appt with Daniel Franco NP to 1300, note sent to [facility] in basket asking to notify April Ribeiro (family) if this will not work. Pt d/c to home in stable condition.

## 2019-07-15 NOTE — PROGRESS NOTES
ANP Visit Note    Patient Name: Bud Kenyon   YOB: 1960   Medical Record Number: [id number]   CSN: [id number]   Date of visit: 7/15/2019       Chief Complaint/Reason for Visit:  Metastatic Rectal Cancer     Oncologic JSONCSJ:           0/ unspecified     Unspecified asthma(493.90)     Rectal cancer metastasized to liver (HCC)     Anxiety     Weakness generalized     Reactive depression     Peripheral neuropathy due to chemotherapy (HCC)     Chemotherapy-induced enteritis     Secondary liver • Syncope    • Visual impairment     glasses       Surgical History:  Past Surgical History:   Procedure Laterality Date   • COLONOSCOPY     • COLONOSCOPY, POSSIBLE BIOPSY, POSSIBLE POLYPECTOMY 83625 N/A 7/31/2017    Performed by Evelyn Delaney MD at  on file      Years of education: Not on file      Highest education level: Not on file    Occupational History      Not on file    Social Needs      Financial resource strain: Not on file      Food insecurity:        Worry: Not on file        Inability: No Disp: 30 tablet, Rfl: 0  •  HYDROmorphone HCl 2 MG Oral Tab, Take 1 tablet (2 mg total) by mouth every 4 (four) hours as needed for Pain., Disp: 120 tablet, Rfl: 0  •  hydrocortisone Ace-Pramoxine 1-1 % Rectal Foam, Place 1 applicator rectally 2 (two) time EOMs intact. PERRL. Oropharynx is clear. Dry   Neck: No JVD. No palpable lymphadenopathy. Neck is supple. Chest: Clear to auscultation. Diminished bilateral   Heart: Tachy    Abdomen: Soft, diffuse tender with good bowel sounds. Extremities: No edema.   Claryce Forth x10(3) uL    Neutrophil % 87.4 %    Lymphocyte % 4.1 %    Monocyte % 6.4 %    Eosinophil % 0.9 %    Basophil % 0.3 %    Immature Granulocyte % 0.9 %       Impression/Plan:  1.  Metastatic Rectal Cancer: on Lonsurf, Palliative care following, wife states the

## 2019-07-17 NOTE — PROGRESS NOTES
SW received a telephone call from patients sister Stevo Kelly telephone # [phone]. Patient has abdominal pain from his penis catheter. He was supposed to get it taken out by his Urologist Laz Maurice M.D.  He received a call from his nurse stating that they

## 2019-07-18 NOTE — PROGRESS NOTES
Outpatient Oncology Care Plan   Problem list:   knowledge deficit   Problems related to:   chemotherapy  disease/disease progression   Interventions:   provided general teaching   Expected outcomes:   understands plan of care   Progress towards outcome: ma

## 2019-07-18 NOTE — PROGRESS NOTES
Education Record    Learner:  Patient and Family Member    Disease / Diagnosis: dehydration, pain control    Barriers / Limitations:  Pain   Comments:    Method:  Brief focused and Discussion   Comments:    General Topics:  Pain and Plan of care reviewed

## 2019-07-18 NOTE — PROGRESS NOTES
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)     Mass of perirectal soft tissue     Intractable migraine without aura     Hyponatremia     Hyperglycemia     Hydronephrosis     Acute kidney insufficiency     Hydronephrosis, unspecified LLC   • CYSTOSCOPY STENT INSERTION Left 11/27/2018    Performed by Wanda Frye MD at [address] [place] [address],  [address] Left 9/1/2018    Performed by Wanda Frye MD at [facility] MAIN OR   • CYSTOSCOPY URETEROSCOPY Left 7/2/2019    Performed by Claribel Escobar Transportation needs:        Medical: Not on file        Non-medical: Not on file    Tobacco Use      Smoking status: Never Smoker      Smokeless tobacco: Never Used    Substance and Sexual Activity      Alcohol use: No        Alcohol/week: 0.0 oz        F Can, Rfl: 1  •  Loperamide HCl 2 MG Oral Cap, Take 1 capsule (2 mg total) by mouth., Disp: , Rfl: 0  •  LORazepam 0.5 MG Oral Tab, Take 1 tablet (0.5 mg total) by mouth every 6 (six) hours as needed for Anxiety.  And nausea, Disp: 60 tablet, Rfl: 0  •  acet Neurological: Grossly intact. Lymphatics: There is no palpable lymphadenopathy throughout in the cervical,supraclavicular, axillary, or inguinal regions. Psych/Depression: mood and affect are appropriate.      Labs:     Recent Results (from the past 67 Neoplastic Pain: increase Fentanyl to 75 mcgs, IV Dilaudid in office and increase Hydromorphone to 4 mg tablets 1-2 tabs q 2-4 as needed. 3. Urinary retention: per Dr Ruthie Cheadle, [place] once approved by insurance   4.  Hypomagnesemia: IV mag in offi

## 2019-07-23 NOTE — TELEPHONE ENCOUNTER
Daughter Macarena Alston, called to clarify patient's Fentanyl patch dosage. Reviewed with daughter patient should be up to 75 mcg every 72 hours as of the last office visit dated 7/18. Daughter verbalized understanding.

## 2019-07-25 NOTE — PATIENT INSTRUCTIONS
For Dr. Emilee Nova nurse line, call [phone] with any questions or concerns,  Monday through Friday 8:00 to 4:30.      After hours or weekends for urgent needs: [phone].   Central [phone]  Medical Records:   [phone]  Cancer [facility]

## 2019-07-25 NOTE — PROGRESS NOTES
Pt d/c home in stable condition with no new complaints. Pt reports pain better after second dose of dilaudid, rates 4/10. Daughter with pt. Pt instructed to call office if needs IVF/pain medication prior to weekend.  Both pt and daughter verbalized underst

## 2019-07-29 NOTE — TELEPHONE ENCOUNTER
Von Nogueira would like to stop treatment and start hospice care with [facility], his daughter is a SW for them so this is there preference. Order placed.

## 2019-07-30 NOTE — PROGRESS NOTES
[facility]    PATIENT'S NAME: Snow Dewey   ATTENDING PHYSICIAN: Rosemary Gregory M.D.    PATIENT ACCOUNT #: [de-identified] LOCATION: [address] [place] [address] RECORD #: [id number] YOB: 1960   DATE OF SERVICE: 07/25/2019       CANCER ARNIE 0.5 mg q.6 h. p.r.n., ondansetron 8 mg q.8 h. p.r.n., MiraLAX p.r.n., potassium chloride 10 mEq twice daily, and prochlorperazine 10 mg q.6 h. p.r.n.        PHYSICAL EXAMINATION:    GENERAL:  He is a well-developed, chronically ill-appearing male in no acut

## 2019-08-01 NOTE — PATIENT INSTRUCTIONS
For Dr. Kristen Duong nurse line, call [phone] with any questions or concerns,  Monday through Friday 8:00 to 4:30.      After hours or weekends for urgent needs: [phone].   Central [phone]  Medical Records:   [phone]  Cancer [facility]

## 2019-08-01 NOTE — PROGRESS NOTES
Outpatient Oncology Care Plan  Problem list:  pain  dehydration    Problems related to:    disease/disease progression    Interventions:  monitor effect of pain management  promoted rest    Expected outcomes:  adequate sleep/rest  family support/coping wel

## 2019-08-02 NOTE — TELEPHONE ENCOUNTER
Verbal okay given to Thais Pineda with [facility] for pt to have an extra nursing visit today to help pt with nephrostomy tube dressings.

## 2019-08-02 NOTE — PROGRESS NOTES
[facility]    PATIENT'S NAME: Margoth Johansen   ATTENDING PHYSICIAN: Jassi Gonzalez M.D.    PATIENT ACCOUNT #: [de-identified] LOCATION: [address] [place] [address] RECORD #: [id number] YOB: 1960   DATE OF SERVICE: 08/01/2019       CANCER ARNIE mcg q.72 h.; hydrocodone 10/325 one to two tablets q.6 h. p.r.n.; hydromorphone 4 mg q.3 h. p.r.n.; loperamide 2 to 4 mg q.i.d. p.r.n.; lorazepam 0.5 mg q.6 h. p.r.n.; ondansetron 8 mg q.8 h. p.r.n.; MiraLAX 17 g daily p.r.n.; potassium chloride 10 mEq twi anymore. If he is stable, he can certainly do so. Dictated By Mary Rodarte M.D.  d: 08/02/2019 07:07:10  t: 08/02/2019 08:01:20  Job [id number]  BX/    cc: Kristine Altman M.D. Alphonse Quintero, DO

## 2019-08-16 NOTE — ED PROVIDER NOTES
Patient Seen in: [facility] Emergency Department    History   Patient presents with:  Altered Mental Status (neurologic)    Stated Complaint: ams/on hospice/agitated    HPI    The patient is a 75-year-old male hospice patient, who has metastatic rec by Reed Almonte MD at [facility]   • CYSTOSCOPY URETEROSCOPY Left 3/9/2019    Performed by Reed Almonte MD at [facility] MAIN OR   • [address] N/A 8/11/2017    Performed by Zelalem Stoner MD at [facility] ENDOSCOPY   • FAVIOLA Bond. EXT/INT peripheral edema or JVD  Lungs: Speaking full sentences without any distress or retractions. Clear to auscultation bilaterally no wheezes or rales or rhonchi. Abdomen: Normoactive bowel sounds. Soft, nondistended. No HSM or masses.  Nontender throughout a

## 2019-08-16 NOTE — ED NOTES
C/o testicle pain. Stat lock securing device became loss and was stuck on testicles causing pulling of the skin. Sticker was removed and sullivan was re-secured with new stat lock.  Patient states he is feeling better now

## 2019-08-18 PROBLEM — N30.00 ACUTE CYSTITIS WITHOUT HEMATURIA: Status: ACTIVE | Noted: 2019-01-01

## 2019-08-18 NOTE — PLAN OF CARE
Stands at bedside though legs buckle  frail , unable to ambulate d/t weakness , old site / puncture wound of nephrostomy that was dislodged yesterday , constant leaking urine , bruise to left posterior flank site, sullivan catheter present w/ yellow urine , d

## 2019-08-18 NOTE — PHYSICAL THERAPY NOTE
Orders received and hx and chart reviewed. Pt is not appropriate for PT services as pt is current with hospice services at home and has hospice consult. Thank you.

## 2019-08-18 NOTE — PROGRESS NOTES
NURSING ADMISSION NOTE      Patient admitted via ambulance. Transported on [facility]. Patient and family oriented to room. Safety precautions initiated. Bed in low position. Call light in reach. Petey Velez notified of admission.

## 2019-08-18 NOTE — H&P
[facility]ITAL[facility]  History and Physical     Mukund Martínez Patient Status:  Inpatient    1960 MRN [id number]   [facility] 4NW-A Attending Alejandro Farah DO   Hosp Day # 0 [address],      Chief Complaint: Dislodged P Nhi Dumont MD at [facility] MAIN OR   • CYSTOSCOPY URETEROSCOPY Left 3/9/2019    Performed by Carol Artis MD at [facility] MAIN OR   • [address] N/A 8/11/2017    Performed by Cesar Connor MD at [facility] ENDOSCOPY   • IR [address] EXT/INT Left 11/20 Disp: 180 tablet Rfl: 0   HYDROcodone-acetaminophen  MG Oral Tab Take 1-2 tablets by mouth every 6 (six) hours as needed for Pain.  Disp: 240 tablet Rfl: 0   hydrocortisone Ace-Pramoxine 1-1 % Rectal Foam Place 1 applicator rectally 2 (two) times kendell Chest and Back: No tenderness or deformity. Abdomen: Soft, diffusely tender, nondistended. Hypoactive bowel sounds. No rebound, guarding or organomegaly. Neurologic: No focal neurological deficits. Musculoskeletal: Moves all extremities.   Extremitie relay how they would like us to proceed in AM  4. Also requesting hospice consult which has been placed  5.  Regarding code status, family ok with BiPAP, pressors but not CPR/intubation/defibrillation     Quality:  · DVT Prophylaxis: SCD  · CODE status: Par

## 2019-08-18 NOTE — CM/SW NOTE
SW received orders for hospice. SW spoke with pt's sister who was at bedside. Pt's sister states pt's spouse is at home sleeping, and will be in this afternoon. SW to f/u with pt's spouse. Pt's sister reports pt was current with [facility].  Pt's s

## 2019-08-19 NOTE — OCCUPATIONAL THERAPY NOTE
OT eval and treat orders received per [facility] ADL screening protocol. Chart reviewed and noted patient has been on hospice at home and per  note, plan is to discharge on hospice service. Will sign off at this time.

## 2019-08-19 NOTE — CM/SW NOTE
Spoke with pt's RN regarding possible DC later today. Pt's wife has stated preference for resuming [facility] at DC. Spoke with Edwin Lowry from [facility] ([phone]) who stated that pt did revoke hospice for hospital admission.   They would need but discussed that pt's RN/PCT can help to assess pt's mobility by helping pt transfer to chair. Pt/family agreeable. Ambulance transport still scheduled for tomorrow AM but can be canceled if pt/family and RN determine pt can safely travel by car.   Yoselin Alves

## 2019-08-19 NOTE — PLAN OF CARE
Pt alert and oriented x4, drowsy at times. Pt denies SOB and VSS. Sating in 90s on 4L NC. C/o pain to abd, IV dilaudid given this AM by night RN. Went down for Nephrostomy tube placement this afternoon.  L nephrostomy tube in place, draining blood tinged ur

## 2019-08-19 NOTE — DIETARY NOTE
[address]     Admitting diagnosis:  rectal cancer with agitation  Rectal cancer metastatic to bone ([facility] [address]    Ht:  5'10\"  Wt: 51.7 kg (114 lb). This is 68% of IBW  Body mass index is 16.36 kg/m².   IBW: 75.4 kg

## 2019-08-19 NOTE — CM/SW NOTE
Received call from pt's wife who stated that she is now unsure if they would like to change hospice providers.   They had been interested in switching to [facility] as pt had had [facility] in the past and preferred a particular nurse with the

## 2019-08-19 NOTE — PROGRESS NOTES
I was able to connect with patient's daughter ( who is a RN) and wife over the phone and discuss POC/GOC extensively. Family would like to focus on patient's comfort and better pain management.  They feel that [facility] has not done a good job in

## 2019-08-19 NOTE — DISCHARGE SUMMARY
[facility] [place] HOSPITALIST  DISCHARGE SUMMARY     Malia Russell Patient Status:  Observation    1960 MRN [id number]   [facility] 4NW-A Attending Angelo Mckeon MD   Hosp Day # 1 PCP SUSIE MISTRY DO     Date of Admission: 2019  D management. That being said, no plan for further antibiotic, pressor support or NIPPV. DNR forms signed.    Please note that during these discussions patient's 2 sons were not completely agreeable with the plans of care and not treat what may be reversible known as:  UROXATRAL      TAKE 1 TABLET BY MOUTH EVERY DAY WITH BREAKFAST   Quantity:  30 tablet  Refills:  0     AZO-CRANBERRY OR      Take 1 tablet by mouth daily.    Refills:  0     Cyclobenzaprine HCl 10 MG Tabs  Commonly known as:  cyclobenzaprine for each of these medications  · fentaNYL 100 MCG/HR Pt72  · HYDROmorphone HCl 4 MG Tabs  · LORazepam 0.5 MG Tabs  · Morphine Sulfate (Concentrate) 100 MG/5ML Soln  · morphINE Sulfate ER 15 MG Tbcr         [place]    Vital signs:  Temp:  [97.9

## 2019-08-19 NOTE — PROGRESS NOTES
[facility] HOSPITALIST  Progress Note     Rocio Abebe Patient Status:  Inpatient    1960 MRN [id number]   [facility] 4NW-A Attending Mirian Smith MD   Hosp Day # 1 PCP [address] [place] [address],      Chief Complaint: lethargic    S: Madeline 1 patch Transdermal Q72H       ASSESSMENT / PLAN:     1. Dislodged/pulled left PCNT in setting of urinary retention with left hydronephrosis  1. Originally placed 7/3  2.  Plan to place for palliative/comfort reason today  2. ? UTI v colonization due to ch

## 2019-08-20 NOTE — PROGRESS NOTES
NURSING DISCHARGE NOTE    Discharged Home via Wheelchair. Accompanied by Family member and Spouse  Belongings Taken by patient/family. Pt AOx4. C/o abdominal pain--given scheduled MS contin and prn dilaudid and roxanol with adequate relief.  Pt's

## 2019-08-20 NOTE — PROGRESS NOTES
Patient is alert,oriented,no complaint of pain,scheduled MS Contin given as ordered. IV fluids ongoing. Scheduled for discharge in AM

## 2019-08-20 NOTE — CM/SW NOTE
CM contacted [facility] and [facility] to confirm today's discharge home with hospice. Patient will be discharged via wife's car d/t insurance not covering ambulance. Hospice referral faxed #[phone].     Tereza MARTIN, 08/20/19, 10:41

## 2019-08-21 NOTE — PAYOR COMM NOTE
[facility]JAY BERGERON    PLACE IN OBSERVATION (Order #[id number]) on 8/18/19    --------------  ADMISSION REVIEW     Payor: [address] [place] [address] #:  [id number]  Authorization Number: [id number]

## 2019-08-29 ENCOUNTER — TELEPHONE (OUTPATIENT)
Dept: FAMILY MEDICINE CLINIC | Facility: CLINIC | Age: 59
End: 2019-08-29

## 2019-08-29 NOTE — TELEPHONE ENCOUNTER
Medical Records Request received from [address] [place] [address] for records from 6/1/18 to present. Records to be sent to:   [address] [place] [address], [address] [place] [address], [address] [place] [address]    Release sent to [employer] on 8/29/19.   Release a

## 2019-12-23 NOTE — PROGRESS NOTES
[facility]    PATIENT'S NAME: Miqueljose elias Cayden   ATTENDING PHYSICIAN: Gina Rivas M.D.    PATIENT ACCOUNT #: [de-identified] LOCATION: [address] [place] [address] RECORD #: [id number] YOB: 1960   DATE OF SERVICE: 06/27/2019       CANCER ARNIE does have a mass outside his rectum and pushing against the pelvic sidewall.   His current medications include Tylenol p.r.n., Azo, cranberry, vitamin D, cyclobenzaprine, hydrocodone, I am adding hydromorphone, I am adding a fentanyl patch 25 mcg q.72 h., l then another 5 days b.i.d. We will begin the process for getting the drug pre-approved. In the meantime, we will try to get his pain under control and see if we can get Dr. Anuj Brown to consider changing his stent to see if that is a factor in his pain.     D No

## 2021-01-01 NOTE — PROGRESS NOTES
Patient is here for cycle 3 of FOLFOXIRI +Avastin. Patient reports dehydration first week after chemo. Patient came in for IV fluids on 3 separate visits. Appetite is good. Nausea on a daily basis, taking antiemetics.  Patient developed mouth sores last wee Reason for Visit: 6 month Well Child Exam    Boris Brown is a 6 month old male who presents for well baby exam. Patient presents with Mother.    Concerns raised today include:   Check ears - infection improving? Was seen at  5 days ago and started on cefdinir after 10 day course of amoxicillin due to unresolved otitis     Interval History:  Birth history, medical history, surgical history, and family history reviewed and updated. No chronic medical conditions . No recent illnesses or injuries . No prior surgeries. No known allergies . Daily medications include cefdinir (on day 5) and multivitamin. Family history significant for none.    Feeding: bottle of breast milk 4 oz 4-5x a day, also supplementing with Similac pro choice 4 oz 3x a day.     Sleeping: own room, crib, on back and on side    Elimination:  Normal wet diapers and bowel movements.    Vision/Hearing: No concerns.     Dental: No concerns.     Safety: in rear facing car seat    SOCIAL:  Primary caretakers: Mother and father.   Lives with: Mother and father.   :     Tobacco Use: Never             DEVELOPMENT:  Plays with feet, Transfers objects from hand to hand, Bears some weight onn legs, Babbles and starting to sit unsupported     PHYSICAL EXAM:  Temperature 97.8 °F (36.6 °C), temperature source Axillary, height 26.3\" (66.8 cm), weight 7.272 kg (16 lb 0.5 oz), head circumference 44.2 cm (17.4\").  GEN:  Well appearing male infant, nontoxic, no acute distress.  Alert and     interactive.  SKIN: Warm, normal turgor.  No cyanosis.  No bruises or lesions.  HEAD:  Normocephalic, atraumatic.  Anterior fontanel open, soft and flat.  EYES:  Conjunctivae appear normal, non-injected, non-icteric.  NOSE:  Appears normal, no flaring.  EARS:  Normal pinnae, no preauricular skin tags or pit.  TMs are gray, slightly dull, no bulging, no purulent effusion, clear  THROAT:  Oropharynx with moist mucous membranes and no lesions.  NECK:  Supple, no  lymphadenopathy or masses.  HEART:  Regular rate and rhythm.  Quiet precordium.  Normal S1, S2.  No murmurs, rubs, gallops. Equal femoral pulses.  LUNGS:  Clear to auscultation bilaterally.  No wheezes, rales, rhonchi.  Normal work of breathing.  ABD:  Soft, nontender.  No organomegaly or masses.  :  Andre 1 male and Testes descended bilaterally.   MSK:  Hips within normal range of motion.  Negative Santos, Ortolani.  Spine straight.  Normal sacrum.  EXT:  Warm, dry, without abnormalities.  NEURO:  Normal tone, bulk, strength.    ASSESSMENT:  6 month old male well infant.  Normal growth and development  Resolved otitis - can stop cefdinir after day 7.     PLAN:   1. All parental concerns and questions discussed.  2. Anticipatory guidance provided, including feeding, sleep, safety and development.   3. Immunizations per orders.  Risks, benefits, and side effects discussed - will return in 1-2 weeks after completing antibiotic course    Follow-up:  RTC for 9 month WCE or sooner prn illness/concerns.

## 2022-03-22 NOTE — PLAN OF CARE
Orthopaedics Office Visit - New Patient Visit    ASSESSMENT/PLAN:    Assessment:   Mild bilateral hip OA    Plan:   · X-rays were performed in the office today and reviewed   · Mobic, PT and intra-articular CSI's were discussed   · PT was ordered today   · He will take Aleve on an as needed basis   · Follow up in 6 weeks time for re-evaluation     To Do Next Visit:  Possible intra-articular CSI     _____________________________________________________  CHIEF COMPLAINT:  Chief Complaint   Patient presents with    Left Hip - Pain    Right Hip - Pain         SUBJECTIVE:  Laurel Montano is a 77 y o  male who presents to the office today for bilateral hip pain, right worse then left  Vicki Muta notes pain to his groin bilaterally as well as to the lateral aspect of his hips  Pain has been ongoing for a few months, no injury or trama  Pain will worsen with prolonged waking  He is not taking anything for pain control at this time  No previous treatment  PAST MEDICAL HISTORY:  History reviewed  No pertinent past medical history  PAST SURGICAL HISTORY:  Past Surgical History:   Procedure Laterality Date    BILE DUCT EXPLORATION      GALLBLADDER SURGERY      HERNIA REPAIR         FAMILY HISTORY:  Family History   Problem Relation Age of Onset    No Known Problems Mother     Stroke Father     Breast cancer Sister        SOCIAL HISTORY:  Social History     Tobacco Use    Smoking status: Never Smoker    Smokeless tobacco: Never Used   Vaping Use    Vaping Use: Never used   Substance Use Topics    Alcohol use: Yes     Comment: social    Drug use: Never       MEDICATIONS:  No current outpatient medications on file  ALLERGIES:  No Known Allergies    REVIEW OF SYSTEMS:  MSK: as noted in HPI  Neuro: WNL's  Pertinent items are otherwise noted in HPI  A comprehensive review of systems was otherwise negative      LABS:  HgA1c: No results found for: HGBA1C  BMP:   Lab Results   Component Value Date    K 4 3 09/15/2021 Problem: Impaired Functional Mobility  Goal: Achieve highest/safest level of mobility/gait  Description  Interventions:  - Assess patient's functional ability and stability  - Promote increasing activity/tolerance for mobility and gait  - Educate and eng CO2 20 09/15/2021     (H) 09/15/2021    BUN 11 09/15/2021    CREATININE 0 91 09/15/2021     CBC: No components found for: CBC    _____________________________________________________  PHYSICAL EXAMINATION:  Vital signs: /85   Pulse (!) 53   Ht 5' 8" (1 727 m)   Wt 85 2 kg (187 lb 12 8 oz)   BMI 28 55 kg/m²   General: No acute distress, awake and alert  Psychiatric: Mood and affect appear appropriate  HEENT: Trachea Midline, No torticollis, no apparent facial trauma  Cardiovascular: No audible murmurs; Extremities appear perfused  Pulmonary: No audible wheezing or stridor  Skin: No open lesions; see further details (if any) below    MUSCULOSKELETAL EXAMINATION:    Extremities:  Bilateral hip  No erythema, ecchymosis or edema  Pain with resisted extension   No pain with resisted flexion   Flexion and extension strength 5/5   Pain with internal and external rotation, right more so then left  Ambulates without assistance      _____________________________________________________  STUDIES REVIEWED:  I personally reviewed the images and interpretation is as follows: x-ray bilateral hip demonstrates no acute fracture or dislocation, medial narrowing bilaterally         PROCEDURES PERFORMED:  Procedures      Scribe Attestation    I,:  Armando Martinez am acting as a scribe while in the presence of the attending physician :       I,:  Paul Aguilera MD personally performed the services described in this documentation    as scribed in my presence :

## 2022-08-15 NOTE — ANESTHESIA POSTPROCEDURE EVALUATION
[address] Patient Status:  Hospital Outpatient Surgery   Age/Gender 62year old male MRN [id number]   Location [address] [facility] Attending Wanda Frye MD   Hosp Day # 0 PCP SUSIE MISTRY DO       A
Opt out

## 2022-10-11 NOTE — PROGRESS NOTES
NUTRITION F/U NOTE:      DX: rectal cancer  TX: CONCURRENT XELODA/RT (pt last RT scheduled for 10/11/17)     WT HX:   10/03/17: 182 lbs  09/21/17: 184 lbs 12.8 oz  09/18/17: 183 lbs 14.4 oz  09/11/17: 181 lbs  09/05/17: 182 lbs 8 oz  08/23/17: 185 lbs    [FreeTextEntry1] : Patient is a 78-year-old male with history of carotid stenosis, coronary disease, peripheral vascular disease renal artery status post stent, hypertension, hyperlipidemia obesity, gout, lumbar stenosis severe, BPH status post prostatectomy, who presents for comprehensive annual physical examination\par \par 1 vasculopathy\par carotid artery disease peripheral vascular disease, coronary disease, renal artery stenosis status post set\par continue aggressive cholesterol-lowering\par follow-up cardiology continue Plavix\par \par 2/ Obesity\par  on diet and exercise\par \par 3. Anemia\par check CBC\par follow-up with hematology\par \par 4. Hyperlipidemia\par continue l Lipitor 80 mg check lipid profile\par \par 5 urinary frequency\par check UA\par \par 6. Constipation\par start MiraLAX avoid Pepto-Bismol\par stools talk\par \par 7 right hip pain/lumbar stenosis\par follow-up with Dr. Ring\par \par 8 gout\par check uric acid on allopurinol\par \par \par 9. Chronic kidney disease\par creatinine around 2.8 check BUN creatinine follow-up with renal\par \par 10 health maintenance\par check routine labs\par request old immunization records\par follow-up in three months\par \par 11 urinary frequency\par check UA follow-up with urology\par \par 12 left hand swelling\par possible injury all fall check x-ray\par \par 13 groin rash\par tenia versicolor check came ketoconazole cream Yes

## 2024-01-02 NOTE — PROGRESS NOTES
Pt here for MD jiménez/sam. Pt has rec'd 19/28 XRT treatments thus far. Pt states energy level is low, appetite is low, eats small amounts. Pt notes pain in rectal area 10/10 on pain scale, states he takes [place] BID. Pt using rectal creams with not much relief.  Pt Refill Decision Note   Phil Verduzco  is requesting a refill authorization.  Brief Assessment and Rationale for Refill:  Quick Discontinue     Medication Therapy Plan:  Receipt confirmed by pharmacy (12/27/2023  3:31 PM CST)      Comments:     Note composed:1:42 AM 01/02/2024

## 2024-08-21 NOTE — PROGRESS NOTES
CC:Patient presents with: Follow - Up      HPI:   Claritza Tyler is a(n) 59year old male followed by Dr. Noe Wiggins for new diagnosis of rectal cancer.   He initiated chemo/RT with oral xeloda, he is now a few weeks in now  Treatment was started on 8/31, he How Severe Is Your Skin Lesion?: mild Have Your Skin Lesions Been Treated?: not been treated axillary, or inguinal regions.   Psych/Depression: normal    LABS    Lab Results  Component Value Date   WBC 3.5 09/21/2017   RBC 4.68 09/21/2017   HGB 15.3 09/21/2017   HCT 43.9 09/21/2017   MCV 93.8 09/21/2017   MCH 32.7 09/21/2017   MCHC 34.9 09/21/2017 Is This A New Presentation, Or A Follow-Up?: Skin Lesion Additional History: Pt has a lesion on left leg to be examined.

## 2024-10-21 NOTE — PROGRESS NOTES
[facility]    PATIENT'S NAME: Ness Santacruz   ATTENDING PHYSICIAN: LESLIE Reyes    PATIENT ACCOUNT #: [de-identified] LOCATION: [address] [place] [address] RECORD #: [id number] YOB: 1960   DATE OF SERVICE: 10/11/2018       [facility] P pain has resolved. He also has had a stent placement, left side, due to hydroureter from his left retroperitoneal lymphadenopathy. PHYSICAL EXAMINATION:    GENERAL:  He is a well-developed, well-nourished male who is in no acute distress.   VITAL SIGNS: upcoming GI Oncology Tumor Board after his fourth cycle and after a repeat imaging study is performed.     Dictated By Delilah Barr M.D.  d: 10/21/2018 19:19:48  t: 10/21/2018 21:55:40  [facility] [id number]  NL/    cc: Dr. Navin Corral Is This A New Presentation, Or A Follow-Up?: Skin Lesion Additional History: Pt has had 2 IPL treatments for the lesion. Pt had her last treatment in April and states the lesion did not improve.

## 2024-12-24 NOTE — TELEPHONE ENCOUNTER
Cystoscopy     Date/Time  12/24/2024 10:30 AM     Performed by  Leighton Washington MD   Authorized by  Leighton Washington MD         Procedure Details:  Procedure type: cystoscopy      Zack is a 66-year-old male who underwent TURP and cystolitholapaxy in October 2024.  He now reports of weakening of the urinary stream and presents today for cystoscopy to assess for possible stricture.      Male cystoscopy procedure note:  Risk and benefits of flexible cystoscopy were discussed. Informed consent was obtained. The patient was placed in the supine position. His genitalia was prepped and draped in a sterile fashion. Viscous lidocaine jelly was instilled into the urethra.  Narrowing of the fossa navicularis was appreciated.  I utilized a cone dilator to push through a narrow but short segment stricture.  I then filled the urethra with viscous lidocaine and passed the cystoscope easily into the bladder.  The bulbar urethra was normal.  The sphincter was well coapted.  The prostatic urethral channel was widely patent.  There was some additional residual tissue on the left side of the prostate but otherwise an open channel into the bladder was noted.  The bladder was entered and was thick-walled with trabeculation.    Impression: History of BPH with bladder stones, status post TURP with cystolitholapaxy, status post dilation of fossa navicularis stricture    Plan: The fossa navicularis stricture was dilated and the patient was then able to void with a forceful urinary stream.  I recommend that he continue to monitor his voiding pattern and return for scheduled appointment in January 2025 for a postvoid residual assessment and uroflow evaluation.  The patient was also provided with a cone dilator in case he has recurrent narrowing of the fossa.  I instructed him to contact me before he starts self dilation.  Patient is amenable with this plan.   [facility]. Let them know [facility].

## (undated) DEVICE — SEAL Y BIOPSY PORT P6R SCOPE

## (undated) DEVICE — CYSTO CDS-LF: Brand: MEDLINE INDUSTRIES, INC.

## (undated) DEVICE — ENDOSCOPY PACK - LOWER: Brand: MEDLINE INDUSTRIES, INC.

## (undated) DEVICE — TRAP 4 CPTR CHMBR N EZ INLN

## (undated) DEVICE — NITINOL WIRE ANGLED 038

## (undated) DEVICE — INSUFFLATION NEEDLE TO ESTABLISH PNEUMOPERITONEUM.: Brand: INSUFFLATION NEEDLE

## (undated) DEVICE — 3M(TM) MICROPORE TAPE DISPENSER 1535-2: Brand: 3M™ MICROPORE™

## (undated) DEVICE — FORCEP RADIAL JAW 4

## (undated) DEVICE — PROXIMATE RELOADABLE LINEAR STAPLER: Brand: PROXIMATE

## (undated) DEVICE — MATTRESS PT HOVERMATT 1/2L 34W

## (undated) DEVICE — GAUZE SPONGES,USP TYPE VII GAUZE, 12 PLY: Brand: CURITY

## (undated) DEVICE — TROCAR: Brand: KII® SLEEVE

## (undated) DEVICE — NITINOL WIRE ANGLED 035

## (undated) DEVICE — FILTERLINE NASAL ADULT O2/CO2

## (undated) DEVICE — SNARE CAPTIFLEX MICRO-OVL OLY

## (undated) DEVICE — TIGERTAIL 5F FLXTIP 70CM

## (undated) DEVICE — MONOFILAMENT ABSORBABLE SUTURE: Brand: MAXON

## (undated) DEVICE — SOL  .9 3000ML

## (undated) DEVICE — SOL  .9 1000ML BTL

## (undated) DEVICE — #15 STERILE STAINLESS BLADE: Brand: STERILE STAINLESS BLADES

## (undated) DEVICE — GAMMEX® PI HYBRID SIZE 7.5, STERILE POWDER-FREE SURGICAL GLOVE, POLYISOPRENE AND NEOPRENE BLEND: Brand: GAMMEX

## (undated) DEVICE — NITINOL WIRE STR 038

## (undated) DEVICE — TROCAR: Brand: KII FIOS FIRST ENTRY

## (undated) DEVICE — GAMMEX® PI HYBRID SIZE 7, STERILE POWDER-FREE SURGICAL GLOVE, POLYISOPRENE AND NEOPRENE BLEND: Brand: GAMMEX

## (undated) DEVICE — Device

## (undated) DEVICE — PROXIMATE RELOADABLE LINEAR CUTTER WITH SAFETY LOCK-OUT, 75MM: Brand: PROXIMATE

## (undated) DEVICE — 1200CC GUARDIAN II: Brand: GUARDIAN

## (undated) DEVICE — 3M™ TEGADERM™ TRANSPARENT FILM DRESSING, 1626W, 4 IN X 4-3/4 IN (10 CM X 12 CM), 50 EACH/CARTON, 4 CARTON/CASE: Brand: 3M™ TEGADERM™

## (undated) DEVICE — Device: Brand: DEFENDO AIR/WATER/SUCTION AND BIOPSY VALVE

## (undated) DEVICE — LAPAROTOMY SPONGE - RF AND X-RAY DETECTABLE PRE-WASHED: Brand: SITUATE

## (undated) DEVICE — KENDALL SCD EXPRESS SLEEVES, KNEE LENGTH, MEDIUM: Brand: KENDALL SCD

## (undated) DEVICE — 3M™ RED DOT™ MONITORING ELECTRODE WITH FOAM TAPE AND STICKY GEL, 50/BAG, 20/CASE, 72/PLT 2570: Brand: RED DOT™

## (undated) DEVICE — OPEN-END FLEXI-TIP URETERAL CATHETER: Brand: FLEXI-TIP

## (undated) DEVICE — GOWN,SIRUS,FABRIC-REINFORCED,X-LARGE: Brand: MEDLINE

## (undated) DEVICE — SUTURE PDS II 1 CTX

## (undated) DEVICE — PROXIMATE LINEAR CUTTER RELOAD, BLUE, 75MM: Brand: PROXIMATE

## (undated) DEVICE — SUTURE VICRYL 0

## (undated) DEVICE — PROXIMATE LINEAR CUTTER RELOAD (STNADARD) , BLUE, 55MM: Brand: PROXIMATE

## (undated) DEVICE — TROCAR: Brand: KII SHIELDED BLADED ACCESS SYSTEM

## (undated) DEVICE — GLOVE SURG SENSICARE SZ 7

## (undated) DEVICE — VIOLET BRAIDED (POLYGLACTIN 910), SYNTHETIC ABSORBABLE SUTURE: Brand: COATED VICRYL

## (undated) DEVICE — CAUTERY PENCIL

## (undated) DEVICE — GAMMEX® PI HYBRID SIZE 8, STERILE POWDER-FREE SURGICAL GLOVE, POLYISOPRENE AND NEOPRENE BLEND: Brand: GAMMEX

## (undated) DEVICE — GOWN,SIRUS,FABRIC-REINFORCED,LARGE: Brand: MEDLINE

## (undated) DEVICE — LAP CHOLE/APPY CDS-LF: Brand: MEDLINE INDUSTRIES, INC.

## (undated) DEVICE — DISPOSABLE, UNIPOLAR, BOVIE PLUG TO RIGHT ANGLE SINGLE PIN: Brand: QUANTUM

## (undated) DEVICE — PROXIMATE RELOADABLE LINEAR CUTTER WITH SAFETY LOCK-OUT.  55MM LINEAR CUTTER.: Brand: PROXIMATE

## (undated) DEVICE — SUTURE VICRYL 5-0 PC-1

## (undated) DEVICE — SYSTEM PUMPING SINGLE ACTION

## (undated) DEVICE — SOL H2O 1000ML BTL

## (undated) DEVICE — GAMMEX® NON-LATEX PI TEXTURED SIZE 7.5, STERILE POLYISOPRENE POWDER-FREE SURGICAL GLOVE: Brand: GAMMEX

## (undated) NOTE — LETTER
Printed: 2019    Patient Name: Lorrie Carey  : 1960   Medical Record #: [id number]    Consent to Cancer Treatment    I, Lorrie Carey, understand that I have been diagnosed with metastatic rectal cancer.     I understand that the keren contact my oncologist, Dr Catie Padilla, or my Cancer Care Team at any time if I have questions, by calling [facility] at [phone]. Additional written information will be given to me prior to start of therapy.     Additionally, I will receive a

## (undated) NOTE — LETTER
Printed: 2018    Patient Name: Marla Zacarias  : 1960   Medical Record #: [id number]    Consent to Chemotherapy    I, Marla Cousin, understand that I have been diagnosed with rectal cancer.     I understand that the treatment suggested by

## (undated) NOTE — ED AVS SNAPSHOT
Aime Ingram   MRN: [id number]    Department:  [facility] Emergency Department   Date of Visit:  12/6/2018           Disclosure     Insurance plans vary and the physician(s) referred by the ER may not be covered by your plan.  Please contact yo tell this physician (or your personal doctor if your instructions are to return to your personal doctor) about any new or lasting problems. The primary care or specialist physician will see patients referred from the [facility] Emergency Department.  Brannon Cedeño

## (undated) NOTE — LETTER
Consent to Procedure/Sedation    Date: 7/3/2019    Time: _______________    1. I authorize the performance upon Ansley Licona the following:    Left Nephrostomy Tube Insertion    2.  I authorize Dr. Zane Lugo (and whomever is designated as the [employer] Witness: ____________________     Date: ______________    Printed: 7/3/2019   3:18 PM    Patient Name: Alva Miroslava        : 1960       Medical Record #: [id number]

## (undated) NOTE — IP AVS SNAPSHOT
[address]            (For Outpatient Use Only) Initial Admit Date: 8/18/2019   Inpt/Obs Admit Date: Inpt: 8/18/19 / Obs: N/A   Discharge Date:    Chantal Bowman:  [de-identified]   MRN: [de-identified]   CSN: [id number]   CEID: [id number] Subscriber Name:  Deon Beach :    Subscriber ID:  Pt Rel to Subscriber:    Hospital Account Financial Class: Managed Care    2019

## (undated) NOTE — LETTER
[facility]  Tim Acosta 59 [address] [facility], [address] [place] [address]    Consent for Operation    Date: __________________    Time: _______________    1.  I authorize the performance upon Kelly Harrington the following operation:    Procedure(s):  Kaitlynn Bob procedure has been videotaped, the surgeon will obtain the original videotape. The hospital will not be responsible for storage or maintenance of this tape.     6. For the purpose of advancing medical education, I consent to the admittance of observers to t STATEMENTS REQUIRING INSERTION OR COMPLETION WERE FILLED IN.     Signature of Patient:   ___________________________    When the patient is a minor or mentally incompetent to give consent:  Signature of person authorized to consent for patient: ____________ supplements, and pills I can buy without a prescription (including street drugs/illegal medications). Failure to inform my anesthesiologist about these medicines may increase my risk of anesthetic complications.   · If I am allergic to anything or have had Anesthesiologist Signature     Date   Time  I have discussed the procedure and information above with the patient (or patient’s representative) and answered their questions. The patient or their representative has agreed to have anesthesia services.     ___

## (undated) NOTE — LETTER
Meggan Thomson [address] [address]  [place], [address] [place] [address]    Consent for Operation  Date: __________________                                Time: _______________    1.  I authorize the performance upon Slim Alexander the following operation:  Procedkaitlin procedure has been videotaped, the surgeon will obtain the original videotape. The hospital will not be responsible for storage or maintenance of this tape.   7. For the purpose of advancing medical education, I consent to the admittance of observers to the STATEMENTS REQUIRING INSERTION OR COMPLETION WERE FILLED IN.     Signature of Patient:   ___________________________    When the patient is a minor or mentally incompetent to give consent:  Signature of person authorized to consent for patient: ____________ supplements, and pills I can buy without a prescription (including street drugs/illegal medications). Failure to inform my anesthesiologist about these medicines may increase my risk of anesthetic complications. iv.  If I am allergic to anything or have ha Anesthesiologist Signature     Date   Time  I have discussed the procedure and information above with the patient (or patient’s representative) and answered their questions. The patient or their representative has agreed to have anesthesia services.     ___

## (undated) NOTE — LETTER
[facility] Testing Department  Phone: [phone]  Right Fax: [phone]  [facility] 20 By:  Haily bar Date: 19    Patient Name: Cherry Garcia  Surgery Date: 2019    CSN: [id number]  Medical Record: [id number]   :

## (undated) NOTE — LETTER
Printed: 9/10/2018    Patient Name: Michelet Lawson  : 1960   Medical Record #: [id number]    Consent to Cancer Treatment    I, Michelet Lawson, understand that I have been diagnosed with Colorectal Cancer Stage IV.     I understand that the t I have had the chance to ask questions about this treatment, and my questions have been answered to my satisfaction.   I understand that I can contact my oncologist, Dr Bea Torres, or my Cancer Care Team at any time if I have questions, by calling [facility]

## (undated) NOTE — MR AVS SNAPSHOT
[facility], [address] [address] [phone]               Thank you for choosing us for your health care visit with [address] [place] [address], .   We are glad to serve you and happy to provide you with this s requirements for authorization, please wait 5-7 days and then contact your physician's office. At that time, you will be provided with any authorization numbers or be assured that none are required. You can then schedule your appointment.  Failure to obtain · Abnormal pouches in the colon (diverticula)  · Tears in the esophagus or anus  · Nosebleed  · Abnormal blood vessels in the GI tract (angiodysplasia)  Diagnosing the cause of blood in stool  If blood is coming out in your stool, you may have a lower GI t Call your healthcare provider right away if you have any of the following:  · Bleeding from your mouth or anus that can't be stopped  · Fever of 100.4°F (38.0°) or higher  · Bleeding along with feeling lightheaded or dizzy  · Signs of fluid loss (dehydrati [employer] [facility] [address] [place], [place] [address] for making healthy food choices  -   Enjoy your food, but eat less. Fully enjoy your food when eating. Don’t eat while distracted and slow down. Avoid over sized portions. Don’t eat while when you’re bored.

## (undated) NOTE — LETTER
[facility]  Phone: [phone]  Right Fax: [phone]  [facility] By:  Brandi Maher RN Date: 3/6/19    Patient Name: Quentin Reil  Surgery Date: 3/9/2019    CSN: [id number]  Medical Record: [id number]   DO

## (undated) NOTE — LETTER
[facility] [address], [address]  Consent for Procedure/Sedation    Date:     Time:       1. I authorize the performance upon Jennifer Pryor the following:  VENOUS ACCESS PORT IMPLANT     2.  I authorize _____________________ ________________________________    ___________________    Witness: _________________________      Date: ___________________    Printed: 2018   4:30 PM  Patient Name: Dioni Connor        : 1960       Medical Record #: [id number]

## (undated) NOTE — LETTER
Consent to Procedure/Sedation    Date: 8/19/2019    Time: _______________    1. I authorize the performance upon Claudetta Ingles the following:   Left Nephrostomy Tube Insertion     2.  I authorize Dr. Malcolm Pratt (and whomever is designated as the [employer] Witness: ____________________     Date: ______________    Printed: 2019   1:23 PM    Patient Name: Bud Kenyon        : 1960       Medical Record #: [id number]

## (undated) NOTE — Clinical Note
FYI patient had scripts in pocket from last visit. I spoke with neighbor who brought him to obtain scripts. Do we need to do anything else for this rash? Looks like drug rash.

## (undated) NOTE — LETTER
Meggan Thomson [address]  [place], [address] [place] [address]    Consent for Operation  Date: __________________                                Time: _______________    1.  I authorize the performance upon Kelly Harrington the following operation:  Procedkaitlin procedure has been videotaped, the surgeon will obtain the original videotape. The hospital will not be responsible for storage or maintenance of this tape.   7. For the purpose of advancing medical education, I consent to the admittance of observers to the STATEMENTS REQUIRING INSERTION OR COMPLETION WERE FILLED IN.     Signature of Patient:   ___________________________    When the patient is a minor or mentally incompetent to give consent:  Signature of person authorized to consent for patient: ____________ supplements, and pills I can buy without a prescription (including street drugs/illegal medications). Failure to inform my anesthesiologist about these medicines may increase my risk of anesthetic complications. iv.  If I am allergic to anything or have ha Anesthesiologist Signature     Date   Time  I have discussed the procedure and information above with the patient (or patient’s representative) and answered their questions. The patient or their representative has agreed to have anesthesia services.     ___

## (undated) NOTE — ED AVS SNAPSHOT
Elliot Arboleda   MRN: [id number]    Department:  [facility] Emergency Department   Date of Visit:  8/15/2019           Disclosure     Insurance plans vary and the physician(s) referred by the ER may not be covered by your plan.  Please contact yo tell this physician (or your personal doctor if your instructions are to return to your personal doctor) about any new or lasting problems. The primary care or specialist physician will see patients referred from the [facility] Emergency Department.  Minda Bright

## (undated) NOTE — LETTER
Printed: 2019    Patient Name: Bud Kenyon  : 1960   Medical Record #: [id number]    Consent to Cancer Treatment    I, Bud Kenyon, understand that I have been diagnosed with metastatic rectal cancer.     I understand that the teri have questions, by calling [facility] at [phone]. Additional written information will be given to me prior to start of therapy. Additionally, I will receive a copy of this consent form.     I have read and fully understand this consent t

## (undated) NOTE — ED AVS SNAPSHOT
Bud Kenyon   MRN: [id number]    Department:  [facility] Emergency Department in [place]   Date of Visit:  9/26/2017           Disclosure     Insurance plans vary and the physician(s) referred by the ER may not be covered by your plan.  Please conta If you have been prescribed any medication(s), please fill your prescription right away and begin taking the medication(s) as directed    If the emergency physician has read X-rays, these will be re-interpreted by a radiologist.  If there is a significant

## (undated) NOTE — LETTER
Meggan Thomson 182 6 [address]  [place], [address] [facility]    Consent for Operation  Date: __________________                                Time: _______________    1.  I authorize the performance upon Dorota Portillo the following operation:  Procedkaitlin revealed by the pictures or by descriptive texts accompanying them. If the procedure has been videotaped, the surgeon will obtain the original videotape. The hospital will not be responsible for storage or maintenance of this tape.   7. For the purpose of a THAT MY DOCTOR PROVIDED ME WITH THE ABOVE EXPLANATIONS, THAT ALL BLANKS OR STATEMENTS REQUIRING INSERTION OR COMPLETION WERE FILLED IN.     Signature of Patient:   ___________________________    When the patient is a minor or mentally incompetent to give co iii. All of the medicines I take (including prescriptions, herbal supplements, and pills I can buy without a prescription (including street drugs/illegal medications).  Failure to inform my anesthesiologist about these medicines may increase my risk of anes _____________________________________________________________________________  Anesthesiologist Signature     Date   Time  I have discussed the procedure and information above with the patient (or patient’s representative) and answered their questions.  The